# Patient Record
Sex: FEMALE | Race: WHITE | NOT HISPANIC OR LATINO | Employment: OTHER | URBAN - METROPOLITAN AREA
[De-identification: names, ages, dates, MRNs, and addresses within clinical notes are randomized per-mention and may not be internally consistent; named-entity substitution may affect disease eponyms.]

---

## 2017-01-17 ENCOUNTER — PATIENT MESSAGE (OUTPATIENT)
Dept: UROLOGY | Facility: CLINIC | Age: 57
End: 2017-01-17

## 2017-01-19 ENCOUNTER — PATIENT MESSAGE (OUTPATIENT)
Dept: UROLOGY | Facility: CLINIC | Age: 57
End: 2017-01-19

## 2017-01-31 ENCOUNTER — PATIENT MESSAGE (OUTPATIENT)
Dept: PULMONOLOGY | Facility: CLINIC | Age: 57
End: 2017-01-31

## 2017-02-01 DIAGNOSIS — J42 CHRONIC WHEEZY BRONCHITIS: Primary | ICD-10-CM

## 2017-02-01 RX ORDER — PROMETHAZINE HYDROCHLORIDE, PHENYLEPHRINE HYDROCHLORIDE AND CODEINE PHOSPHATE 6.25; 5; 1 MG/5ML; MG/5ML; MG/5ML
5 SOLUTION ORAL 4 TIMES DAILY PRN
Qty: 118 ML | Refills: 0 | Status: SHIPPED | OUTPATIENT
Start: 2017-02-01 | End: 2017-02-11

## 2017-02-06 ENCOUNTER — HOSPITAL ENCOUNTER (OUTPATIENT)
Dept: RADIOLOGY | Facility: HOSPITAL | Age: 57
Discharge: HOME | End: 2017-02-06
Attending: UROLOGY
Payer: MEDICAID

## 2017-02-06 ENCOUNTER — OFFICE VISIT (OUTPATIENT)
Dept: UROLOGY | Facility: CLINIC | Age: 57
End: 2017-02-06
Payer: MEDICAID

## 2017-02-06 DIAGNOSIS — R31.9 HEMATURIA: Primary | ICD-10-CM

## 2017-02-06 DIAGNOSIS — R31.9 HEMATURIA: ICD-10-CM

## 2017-02-06 LAB
BACTERIA #/AREA URNS AUTO: ABNORMAL /HPF
HYALINE CASTS UR QL AUTO: 3 /LPF
MICROSCOPIC COMMENT: ABNORMAL
RBC #/AREA URNS AUTO: 12 /HPF (ref 0–4)
SQUAMOUS #/AREA URNS AUTO: 1 /HPF
WBC #/AREA URNS AUTO: 36 /HPF (ref 0–5)

## 2017-02-06 PROCEDURE — 99214 OFFICE O/P EST MOD 30 MIN: CPT | Mod: S$PBB,,, | Performed by: UROLOGY

## 2017-02-06 NOTE — MR AVS SNAPSHOT
Replaced by [facility]  [address]  [place] [address]  Phone: [phone]  Fax: [phone]                  Sarah Monahan   2017 10:20 AM   Office Visit    Description:  Female : 1960   Provider:  Pro Darby IV, MD   Department:  [facility]           Diagnoses this Visit        Comments    Hematuria    -  Primary            To Do List           Future Appointments        Provider Department Dept Phone    2017 11:10 AM LABORATORY, [address] [facility] [phone]    2017 1:00 PM [facility] CT1 LIMIT 500 LBS [facility] -  [phone]    2017 1:00 PM Pro Darby IV, MD Replaced by [facility] [phone]    2017 9:00 AM David Tocsano MD Replaced by [facility] [phone]      Goals (5 Years of Data)     None      [facility] On Call     [facility] On Call Nurse Care Line -  Assistance  Registered nurses in the [facility] On Call Center provide clinical advisement, health education, appointment booking, and other advisory services.  Call for this free service at [phone].             Medications           Message regarding Medications     Verify the changes and/or additions to your medication regime listed below are the same as discussed with your clinician today.  If any of these changes or additions are incorrect, please notify your healthcare provider.             Verify that the below list of medications is an accurate representation of the medications you are currently taking.  If none reported, the list may be blank. If incorrect, please contact your healthcare provider. Carry this list with you in case of emergency.           Current Medications     albuterol (ACCUNEB) 1.25 mg/3 mL Nebu Take 3 mLs (1.25 mg total) by nebulization 3 (three) times daily as needed.    albuterol 90 mcg/actuation inhaler Inhale 2 puffs into the lungs every 4 to 6 hours as needed for Wheezing.    baclofen (LIORESAL) 10  MG tablet Take 10 mg by mouth.    clonazePAM (KLONOPIN) 1 MG tablet Take 1 mg by mouth.    ergocalciferol (ERGOCALCIFEROL) 50,000 unit Cap Take 50,000 Units by mouth.    estradiol 10 mcg Tab Use twice week    fluticasone-salmeterol 500-50 mcg/dose (ADVAIR DISKUS) 500-50 mcg/dose DsDv diskus inhaler Inhale 1 puff into the lungs 2 (two) times daily.    inhalation device (VORTEX HOLDING CHAMBER) Use as directed for inhalation. For use with albuterol inhaler.    omeprazole (PRILOSEC) 40 MG capsule Take 40 mg by mouth.    ondansetron (ZOFRAN-ODT) 8 MG TbDL     phenyleph-promethazine-cod 5-6.25-10 mg/5 ml 6.25-5-10 mg/5 mL Syrp Take 5 mLs by mouth 4 (four) times daily as needed.    quetiapine (SEROQUEL) 50 MG tablet     ranitidine (ZANTAC) 150 MG tablet     roflumilast (DALIRESP) 500 mcg Tab Take 1 tablet (500 mcg total) by mouth once daily. Medically necessary    trazodone (DESYREL) 150 MG tablet     umeclidinium (INCRUSE ELLIPTA) 62.5 mcg/actuation DsDv Inhale 62.5 mcg into the lungs once daily. Medically necessary           Clinical Reference Information           Allergies as of 2/6/2017     Macrodantin [Nitrofurantoin Macrocrystalline]    Nitrofurantoin    Nitrofurantoin Monohyd/m-cryst      Immunizations Administered on Date of Encounter - 2/6/2017     None      Orders Placed During Today's Visit     Future Labs/Procedures Expected by Expires    Creatinine, serum  2/6/2017 4/7/2018    CT Urogram Abd Pelvis W WO  2/6/2017 2/6/2018      Language Assistance Services     ATTENTION: Language assistance services are available, free of charge. Please call [phone].      ATENCIÓN: Si hailyla alysa, tiene a hernández disposición servicios gratuitos de asistencia lingüística. Llame al [phone].     CHÚ Ý: N?u b?n nói Ti?ng Vi?t, có các d?ch v? h? tr? ngôn ng? mi?n phí dành cho b?n. G?i s? [phone].         [facility] complies with applicable Federal civil rights laws and does not discriminate on the basis of  race, color, national origin, age, disability, or sex.

## 2017-02-06 NOTE — PROGRESS NOTES
Chief Complaint: Urolithiasis followup    HPI:   2/6/17: Back after a long time.  Has been seeing Dr. Hutchinson for UTI and she was just given Abx.  Still seeing gross hematuria.  Has a burning right flank pain for the last few months.  Says she had a normal IVP at [facility] last November.  11/12/15: Added bactrim prophylaxis to regimen. Still symptomatic and thinks she has a UTI.  Is on the daily bactrim.  Strong urgency, malodorous urine.  Seeing gross hematuria from time to time.  10/12/15: Still has some right flank pain not related to position.  Will check UCx again - UA negative.  Feels much better than a month ago.  Was in a detox center in [place] and dx with another UTI last week and is finishing more ABx given there.  9/14/15: Returns again with +Ucx treated with cipro; right flank pain persists.  CT shows some stones on the right that are overall stable, but perhaps there is some degree of infection related to them.  8/27/15: Returns for followup after being admitted for a presumed kidney infection earlier this month at [facility].  Has had monghts of microhematuria, recurrent UTI, bilateral flank pain.  Nit+ urine today.  No imaging was done there.  Symptoms have been going on since last summer with UTI then, and the flank pain picked up about a week before the hospitilization.  Malodorous urine.  11/8/12: S/P Right URS; Doing well, no pain, happy to have her stones out and no stent. Was told by another office on Monday she had a UTI so she came for a nurse visit; our UA and UCx  Negative. 24 hour urine study showed low UOP and she had elevated PTH.    Allergies:  Macrodantin [nitrofurantoin macrocrystalline]; Nitrofurantoin; and Nitrofurantoin monohyd/m-cryst    Medications: has a current medication list which includes the following prescription(s): albuterol, albuterol, baclofen, clonazepam, ergocalciferol, estradiol, fluticasone-salmeterol 500-50 mcg/dose, inhalation device, omeprazole, ondansetron,  phenyleph-promethazine-cod 5-6.25-10 mg/5 ml, quetiapine, ranitidine, roflumilast, trazodone, and umeclidinium.    Review of Systems:  General: No fever, chills, fatigability, or weight loss.  Skin: No rashes, itching, or changes in color or texture of skin.  Chest: Denies GUAN, cyanosis, wheezing, cough, and sputum production.  Abdomen: Appetite fine. No weight loss. Denies diarrhea, abdominal pain, hematemesis, or blood in stool.  Musculoskeletal: No joint stiffness or swelling. Denies back pain.  : As above.  All other review of systems negative.    PMH:   has a past medical history of Anemia; Asthma; Bronchitis chronic; Diabetes mellitus; Emphysema of lung; Herniated disc; Hyperlipidemia; Kidney stone; Lung disease; Supraventricular tachycardia; and Urinary tract infection.    PSH:   has a past surgical history that includes Cervical fusion; Cholecystectomy; Knee arthroscopy w/ ACL reconstruction; Hernia repair;  section, classic; Salpingectomy; and Cystoscopy w/ laser lithotripsy.    FamHx: family history includes Cancer in her maternal grandmother and mother; Diabetes in her father and paternal grandmother; Heart attack (age of onset: 60) in her father; Heart disease in her father; Heart failure in her paternal grandmother; Hypertension in her father.    SocHx:  reports that she quit smoking about 20 months ago. She has a 40.00 pack-year smoking history. She has never used smokeless tobacco. She reports that she does not drink alcohol or use illicit drugs.     Physical Exam:  Vitals:   There were no vitals filed for this visit.  General: A&Ox3. No apparent distress. No deformities.  Neck: No masses. Normal thyroid.  Lungs: normal inspiration. No use of accessory muscles.  Heart: normal pulse. No arrhythmias.  Abdomen: Soft. NT. ND.  Skin: The skin is warm and dry. No jaundice.  Ext: No c/c/e.  : deferred.    Labs/Studies:   CT    9/10/15: Small bilateral stones largest 4 mm    Impression/Plan:   1.  Discussed situation again at length.  Cath UA/UCx sent again to check if UTI cleared.  2. Will get CT Urogram; possible cysto.  3. Has recurrent UTI it seems.  Showers no baths.  Dial soap.

## 2017-02-06 NOTE — PROGRESS NOTES
Cath u/a and c/s ordered per Dr. Darby. Using sterile technique, inserted pediatric catheter into bladder and obtained 10ml of cloudy, yellow urine. Patient tolerated well. Specimen sent to lab.

## 2017-02-07 ENCOUNTER — HOSPITAL ENCOUNTER (OUTPATIENT)
Dept: RADIOLOGY | Facility: HOSPITAL | Age: 57
Discharge: HOME | End: 2017-02-07
Attending: UROLOGY
Payer: MEDICAID

## 2017-02-07 PROCEDURE — 25500020 PHARM REV CODE 255: Performed by: UROLOGY

## 2017-02-07 PROCEDURE — 74178 CT ABD&PLV WO CNTR FLWD CNTR: CPT | Mod: TC

## 2017-02-07 RX ADMIN — IOHEXOL 75 ML: 350 INJECTION, SOLUTION INTRAVENOUS at 03:02

## 2017-02-08 ENCOUNTER — PATIENT MESSAGE (OUTPATIENT)
Dept: UROLOGY | Facility: CLINIC | Age: 57
End: 2017-02-08

## 2017-02-08 LAB — BACTERIA UR CULT: NO GROWTH

## 2017-02-09 ENCOUNTER — PATIENT MESSAGE (OUTPATIENT)
Dept: UROLOGY | Facility: CLINIC | Age: 57
End: 2017-02-09

## 2017-02-09 DIAGNOSIS — N20.0 RENAL STONE: Primary | ICD-10-CM

## 2017-02-15 ENCOUNTER — HOSPITAL ENCOUNTER (OUTPATIENT)
Dept: RADIOLOGY | Facility: HOSPITAL | Age: 57
Discharge: HOME | End: 2017-02-15
Attending: UROLOGY
Payer: MEDICAID

## 2017-02-15 DIAGNOSIS — N20.0 RENAL STONE: ICD-10-CM

## 2017-02-15 PROCEDURE — 74000 XR ABDOMEN AP 1 VIEW: CPT | Mod: TC,PO

## 2017-02-15 PROCEDURE — 74000 XR ABDOMEN AP 1 VIEW: CPT | Mod: 26,,, | Performed by: RADIOLOGY

## 2017-02-17 ENCOUNTER — PATIENT MESSAGE (OUTPATIENT)
Dept: UROLOGY | Facility: CLINIC | Age: 57
End: 2017-02-17

## 2017-02-20 ENCOUNTER — TELEPHONE (OUTPATIENT)
Dept: PULMONOLOGY | Facility: CLINIC | Age: 57
End: 2017-02-20

## 2017-02-21 ENCOUNTER — PATIENT MESSAGE (OUTPATIENT)
Dept: UROLOGY | Facility: CLINIC | Age: 57
End: 2017-02-21

## 2017-02-22 ENCOUNTER — PATIENT MESSAGE (OUTPATIENT)
Dept: UROLOGY | Facility: CLINIC | Age: 57
End: 2017-02-22

## 2017-03-09 DIAGNOSIS — J44.9 COPD, MODERATE: ICD-10-CM

## 2017-03-09 RX ORDER — FLUTICASONE PROPIONATE AND SALMETEROL 50; 500 UG/1; UG/1
POWDER RESPIRATORY (INHALATION)
Qty: 180 EACH | Refills: 3 | Status: SHIPPED | OUTPATIENT
Start: 2017-03-09 | End: 2017-05-16 | Stop reason: CLARIF

## 2017-03-10 ENCOUNTER — CLINICAL SUPPORT (OUTPATIENT)
Dept: CARDIOLOGY | Facility: CLINIC | Age: 57
End: 2017-03-10
Payer: MEDICAID

## 2017-03-10 ENCOUNTER — LAB VISIT (OUTPATIENT)
Dept: LAB | Facility: HOSPITAL | Age: 57
End: 2017-03-10
Attending: UROLOGY
Payer: MEDICAID

## 2017-03-10 ENCOUNTER — OFFICE VISIT (OUTPATIENT)
Dept: UROLOGY | Facility: CLINIC | Age: 57
End: 2017-03-10
Payer: MEDICAID

## 2017-03-10 VITALS — SYSTOLIC BLOOD PRESSURE: 132 MMHG | DIASTOLIC BLOOD PRESSURE: 88 MMHG | WEIGHT: 165 LBS | BODY MASS INDEX: 28.32 KG/M2

## 2017-03-10 DIAGNOSIS — N20.0 RENAL STONE: Primary | ICD-10-CM

## 2017-03-10 DIAGNOSIS — N20.0 RENAL STONE: ICD-10-CM

## 2017-03-10 LAB
ANION GAP SERPL CALC-SCNC: 8 MMOL/L
BASOPHILS # BLD AUTO: 0.03 K/UL
BASOPHILS NFR BLD: 0.5 %
BILIRUB SERPL-MCNC: NORMAL MG/DL
BLOOD URINE, POC: NORMAL
BUN SERPL-MCNC: 15 MG/DL
CALCIUM SERPL-MCNC: 9.5 MG/DL
CHLORIDE SERPL-SCNC: 106 MMOL/L
CO2 SERPL-SCNC: 25 MMOL/L
COLOR, POC UA: YELLOW
CREAT SERPL-MCNC: 0.8 MG/DL
DIFFERENTIAL METHOD: ABNORMAL
EOSINOPHIL # BLD AUTO: 0.1 K/UL
EOSINOPHIL NFR BLD: 2.1 %
ERYTHROCYTE [DISTWIDTH] IN BLOOD BY AUTOMATED COUNT: 14.2 %
EST. GFR  (AFRICAN AMERICAN): >60 ML/MIN/1.73 M^2
EST. GFR  (NON AFRICAN AMERICAN): >60 ML/MIN/1.73 M^2
GLUCOSE SERPL-MCNC: 122 MG/DL
GLUCOSE UR QL STRIP: NORMAL
HCT VFR BLD AUTO: 39.9 %
HGB BLD-MCNC: 13.1 G/DL
KETONES UR QL STRIP: NORMAL
LEUKOCYTE ESTERASE URINE, POC: NORMAL
LYMPHOCYTES # BLD AUTO: 2.9 K/UL
LYMPHOCYTES NFR BLD: 47 %
MCH RBC QN AUTO: 33.7 PG
MCHC RBC AUTO-ENTMCNC: 32.8 %
MCV RBC AUTO: 103 FL
MONOCYTES # BLD AUTO: 0.4 K/UL
MONOCYTES NFR BLD: 6.5 %
NEUTROPHILS # BLD AUTO: 2.7 K/UL
NEUTROPHILS NFR BLD: 43.7 %
NITRITE, POC UA: NORMAL
PH, POC UA: 6
PLATELET # BLD AUTO: 261 K/UL
PMV BLD AUTO: 10.6 FL
POTASSIUM SERPL-SCNC: 4.6 MMOL/L
PROTEIN, POC: NORMAL
RBC # BLD AUTO: 3.89 M/UL
SODIUM SERPL-SCNC: 139 MMOL/L
SPECIFIC GRAVITY, POC UA: 1.02
UROBILINOGEN, POC UA: NORMAL
WBC # BLD AUTO: 6.15 K/UL

## 2017-03-10 PROCEDURE — 99214 OFFICE O/P EST MOD 30 MIN: CPT | Mod: S$PBB,,, | Performed by: UROLOGY

## 2017-03-10 PROCEDURE — 93010 ELECTROCARDIOGRAM REPORT: CPT | Mod: S$PBB,,, | Performed by: INTERNAL MEDICINE

## 2017-03-10 PROCEDURE — 36415 COLL VENOUS BLD VENIPUNCTURE: CPT

## 2017-03-10 PROCEDURE — 80048 BASIC METABOLIC PNL TOTAL CA: CPT

## 2017-03-10 PROCEDURE — 99999 PR PBB SHADOW E&M-EST. PATIENT-LVL II: CPT | Mod: PBBFAC,,, | Performed by: UROLOGY

## 2017-03-10 PROCEDURE — 85025 COMPLETE CBC W/AUTO DIFF WBC: CPT

## 2017-03-10 NOTE — PROGRESS NOTES
Chief Complaint: Urolithiasis followup    HPI:   3/10/17: No UTI last visit.  Ct/KUB show a 12mm right renal stone, upper pole amenable to ESWL; has other 1-2mm stones.  2/6/17: Back after a long time.  Has been seeing Dr. Hutchinson for UTI and she was just given Abx.  Still seeing gross hematuria.  Has a burning right flank pain for the last few months.  Says she had a normal IVP at [facility] last November.  11/12/15: Added bactrim prophylaxis to regimen. Still symptomatic and thinks she has a UTI.  Is on the daily bactrim.  Strong urgency, malodorous urine.  Seeing gross hematuria from time to time.  10/12/15: Still has some right flank pain not related to position.  Will check UCx again - UA negative.  Feels much better than a month ago.  Was in a detox center in [place] and dx with another UTI last week and is finishing more ABx given there.  9/14/15: Returns again with +Ucx treated with cipro; right flank pain persists.  CT shows some stones on the right that are overall stable, but perhaps there is some degree of infection related to them.  8/27/15: Returns for followup after being admitted for a presumed kidney infection earlier this month at [facility].  Has had monghts of microhematuria, recurrent UTI, bilateral flank pain.  Nit+ urine today.  No imaging was done there.  Symptoms have been going on since last summer with UTI then, and the flank pain picked up about a week before the hospitilization.  Malodorous urine.  11/8/12: S/P Right URS; Doing well, no pain, happy to have her stones out and no stent. Was told by another office on Monday she had a UTI so she came for a nurse visit; our UA and UCx  Negative. 24 hour urine study showed low UOP and she had elevated PTH.    Allergies:  Macrodantin [nitrofurantoin macrocrystalline]; Nitrofurantoin; and Nitrofurantoin monohyd/m-cryst    Medications: has a current medication list which includes the following prescription(s): advair diskus, albuterol, albuterol,  baclofen, clonazepam, ergocalciferol, estradiol, inhalation device, omeprazole, ondansetron, quetiapine, ranitidine, roflumilast, trazodone, and umeclidinium.    Review of Systems:  General: No fever, chills, fatigability, or weight loss.  Skin: No rashes, itching, or changes in color or texture of skin.  Chest: Denies GUAN, cyanosis, wheezing, cough, and sputum production.  Abdomen: Appetite fine. No weight loss. Denies diarrhea, abdominal pain, hematemesis, or blood in stool.  Musculoskeletal: No joint stiffness or swelling. Denies back pain.  : As above.  All other review of systems negative.    PMH:   has a past medical history of Anemia; Asthma; Bronchitis chronic; Diabetes mellitus; Emphysema of lung; Herniated disc; Hyperlipidemia; Kidney stone; Lung disease; Supraventricular tachycardia; and Urinary tract infection.    PSH:   has a past surgical history that includes Cervical fusion; Cholecystectomy; Knee arthroscopy w/ ACL reconstruction; Hernia repair;  section, classic; Salpingectomy; and Cystoscopy w/ laser lithotripsy.    FamHx: family history includes Cancer in her maternal grandmother and mother; Diabetes in her father and paternal grandmother; Heart attack (age of onset: 60) in her father; Heart disease in her father; Heart failure in her paternal grandmother; Hypertension in her father.    SocHx:  reports that she quit smoking about 21 months ago. She has a 40.00 pack-year smoking history. She has never used smokeless tobacco. She reports that she does not drink alcohol or use illicit drugs.     Physical Exam:  Vitals:   Vitals:    03/10/17 1136   BP: 132/88     General: A&Ox3. No apparent distress. No deformities.  Neck: No masses. Normal thyroid.  Lungs: normal inspiration. No use of accessory muscles.  Heart: normal pulse. No arrhythmias.  Abdomen: Soft. NT. ND.  Skin: The skin is warm and dry. No jaundice.  Ext: No c/c/e.  : deferred.    Labs/Studies:   CT    9/10/15: Small bilateral  stones largest 4 mm    3/10/17: Multiple bilateral stones 1-2mm, 12mm right upper pole stone    Impression/Plan:   1. No UTI, UA findings likely from stone.  Needs to get cleared for surgery and will arrange that prior to making surgery arrangements.  Risks/benefits reviewed, consents on chart.

## 2017-03-15 ENCOUNTER — HOSPITAL ENCOUNTER (OUTPATIENT)
Dept: RADIOLOGY | Facility: HOSPITAL | Age: 57
Discharge: HOME | End: 2017-03-15
Payer: MEDICAID

## 2017-03-15 ENCOUNTER — TELEPHONE (OUTPATIENT)
Dept: PULMONOLOGY | Facility: CLINIC | Age: 57
End: 2017-03-15

## 2017-03-15 ENCOUNTER — PROCEDURE VISIT (OUTPATIENT)
Dept: PULMONOLOGY | Facility: CLINIC | Age: 57
End: 2017-03-15
Payer: MEDICAID

## 2017-03-15 DIAGNOSIS — J44.9 CHRONIC OBSTRUCTIVE PULMONARY DISEASE, UNSPECIFIED COPD TYPE: Primary | ICD-10-CM

## 2017-03-15 DIAGNOSIS — J44.9 CHRONIC OBSTRUCTIVE PULMONARY DISEASE, UNSPECIFIED COPD TYPE: ICD-10-CM

## 2017-03-15 LAB
ALLENS TEST: ABNORMAL
DELSYS: ABNORMAL
HCO3 UR-SCNC: 23 MMOL/L (ref 24–28)
PCO2 BLDA: 40.2 MMHG (ref 35–45)
PH SMN: 7.37 [PH] (ref 7.35–7.45)
PO2 BLDA: 71 MMHG (ref 80–100)
POC BE: -2 MMOL/L
POC SATURATED O2: 94 % (ref 95–100)
SAMPLE: ABNORMAL
SITE: ABNORMAL

## 2017-03-15 PROCEDURE — 94010 BREATHING CAPACITY TEST: CPT | Mod: 26,S$PBB,, | Performed by: INTERNAL MEDICINE

## 2017-03-15 PROCEDURE — 36600 WITHDRAWAL OF ARTERIAL BLOOD: CPT | Mod: 59,S$PBB,, | Performed by: INTERNAL MEDICINE

## 2017-03-15 PROCEDURE — 71020 XR CHEST PA AND LATERAL: CPT | Mod: 26,,, | Performed by: RADIOLOGY

## 2017-03-17 ENCOUNTER — TELEPHONE (OUTPATIENT)
Dept: PULMONOLOGY | Facility: CLINIC | Age: 57
End: 2017-03-17

## 2017-03-17 ENCOUNTER — OFFICE VISIT (OUTPATIENT)
Dept: PULMONOLOGY | Facility: CLINIC | Age: 57
End: 2017-03-17
Payer: MEDICAID

## 2017-03-17 VITALS
RESPIRATION RATE: 18 BRPM | WEIGHT: 156.94 LBS | BODY MASS INDEX: 26.79 KG/M2 | SYSTOLIC BLOOD PRESSURE: 140 MMHG | DIASTOLIC BLOOD PRESSURE: 80 MMHG | HEART RATE: 94 BPM | OXYGEN SATURATION: 94 % | HEIGHT: 64 IN

## 2017-03-17 DIAGNOSIS — R05.9 COUGH: ICD-10-CM

## 2017-03-17 DIAGNOSIS — J44.9 CHRONIC OBSTRUCTIVE PULMONARY DISEASE, UNSPECIFIED COPD TYPE: ICD-10-CM

## 2017-03-17 DIAGNOSIS — Z72.0 TOBACCO USE: ICD-10-CM

## 2017-03-17 DIAGNOSIS — J44.1 COPD WITH EXACERBATION: Primary | ICD-10-CM

## 2017-03-17 PROCEDURE — 99214 OFFICE O/P EST MOD 30 MIN: CPT | Mod: PBBFAC | Performed by: NURSE PRACTITIONER

## 2017-03-17 PROCEDURE — 99999 PR PBB SHADOW E&M-EST. PATIENT-LVL IV: CPT | Mod: PBBFAC,,, | Performed by: NURSE PRACTITIONER

## 2017-03-17 PROCEDURE — 99214 OFFICE O/P EST MOD 30 MIN: CPT | Mod: S$PBB,,, | Performed by: NURSE PRACTITIONER

## 2017-03-17 RX ORDER — DEXTROSE 4 G
TABLET,CHEWABLE ORAL
COMMUNITY
Start: 2016-12-07

## 2017-03-17 RX ORDER — PROMETHAZINE HYDROCHLORIDE AND DEXTROMETHORPHAN HYDROBROMIDE 6.25; 15 MG/5ML; MG/5ML
SYRUP ORAL
COMMUNITY
Start: 2017-03-15 | End: 2017-03-17 | Stop reason: ALTCHOICE

## 2017-03-17 RX ORDER — TRAMADOL HYDROCHLORIDE 50 MG/1
TABLET ORAL
COMMUNITY
Start: 2017-03-15 | End: 2020-05-01

## 2017-03-17 RX ORDER — ROFLUMILAST 500 UG/1
500 TABLET ORAL DAILY
Qty: 90 TABLET | Refills: 3 | Status: SHIPPED | OUTPATIENT
Start: 2017-03-17 | End: 2017-03-17 | Stop reason: SDUPTHER

## 2017-03-17 RX ORDER — PREDNISONE 20 MG/1
TABLET ORAL
Qty: 20 TABLET | Refills: 0 | Status: SHIPPED | OUTPATIENT
Start: 2017-03-17 | End: 2017-03-29

## 2017-03-17 RX ORDER — DOXYCYCLINE 100 MG/1
100 CAPSULE ORAL 2 TIMES DAILY
Qty: 20 CAPSULE | Refills: 0 | Status: SHIPPED | OUTPATIENT
Start: 2017-03-17 | End: 2017-03-27

## 2017-03-17 RX ORDER — PROMETHAZINE HYDROCHLORIDE AND CODEINE PHOSPHATE 6.25; 1 MG/5ML; MG/5ML
5 SOLUTION ORAL EVERY 4 HOURS PRN
Qty: 473 ML | Refills: 0 | Status: SHIPPED | OUTPATIENT
Start: 2017-03-17 | End: 2018-01-17 | Stop reason: SDUPTHER

## 2017-03-17 RX ORDER — ROFLUMILAST 500 UG/1
500 TABLET ORAL DAILY
Qty: 90 TABLET | Refills: 3 | Status: SHIPPED | OUTPATIENT
Start: 2017-03-17 | End: 2017-06-30

## 2017-03-17 NOTE — TELEPHONE ENCOUNTER
Spoke with the pharmacy to confirm change in medication. Ms Reyes also spoke with the pharmacist at [facility] pharmacy.

## 2017-03-17 NOTE — TELEPHONE ENCOUNTER
----- Message from Bessy Batista sent at 3/17/2017  3:24 PM CDT -----  Contact: [facility]  States calling regarding a Rx they received on the patient today.    Pt use..    [facility] - [address] [address]  Phone: [phone] Fax: [phone]

## 2017-03-17 NOTE — MR AVS SNAPSHOT
[facility]  [address] [address]  Phone: [phone]  Fax: [phone]                  Sarah Monahan   3/17/2017 2:40 PM   Office Visit    Description:  Female : 1960   Provider:  Alise Reyes NP   Department:  [facility]           Reason for Visit     COPD           Diagnoses this Visit        Comments    Cough    -  Primary     Chronic obstructive pulmonary disease, unspecified COPD type         COPD with exacerbation         Tobacco use     up to 2 pks a day. request complete cessation, and begin to cut back.            To Do List           Future Appointments        Provider Department Dept Phone    3/28/2017 3:00 PM PULMONARY LAB, [facility]- Pulmonary Function [facility] [phone]    3/29/2017 3:00 PM David Toscano MD [facility]monary Services [phone]      Goals (5 Years of Data)     None      Follow-Up and Disposition     Return in about 12 days (around 3/29/2017) for COPD follow up post acute flare w/review david, needs clearance for lithotripsy if lungs improved. .       These Medications        Disp Refills Start End    roflumilast (DALIRESP) 500 mcg Tab 90 tablet 3 3/17/2017 3/17/2018    Take 1 tablet (500 mcg total) by mouth once daily. Medically necessary - Oral    Pharmacy: [address] #: [phone]       Notes to Pharmacy: Please call patient to pick prescription once it is ready.    promethazine-codeine 6.25-10 mg/5 ml (PHENERGAN WITH CODEINE) 6.25-10 mg/5 mL syrup 473 mL 0 3/17/2017 3/27/2017    Take 5 mLs by mouth every 4 (four) hours as needed for Cough. - Oral    Pharmacy: [address] Ph #: [phone]       predniSONE (DELTASONE) 20 MG tablet 20 tablet 0 3/17/2017     3 daily x 3 days, 2 daily x 3 days, 1 daily x 3 days, 1/2 daily x 4 days.    Pharmacy: [facility]  - [address] #: [phone]       doxycycline (VIBRAMYCIN) 100 MG Cap 20 capsule 0 3/17/2017 3/27/2017    Take 1 capsule (100 mg total) by mouth 2 (two) times daily. - Oral    Ph[facility]: [facility] - [address] Ph #: [phone]         [facility] On Call     [facility] On Call Nurse Care Line - 24/7 Assistance  Registered nurses in the [facility] On Call Center provide clinical advisement, health education, appointment booking, and other advisory services.  Call for this free service at [phone].             Medications           Message regarding Medications     Verify the changes and/or additions to your medication regime listed below are the same as discussed with your clinician today.  If any of these changes or additions are incorrect, please notify your healthcare provider.        START taking these NEW medications        Refills    promethazine-codeine 6.25-10 mg/5 ml (PHENERGAN WITH CODEINE) 6.25-10 mg/5 mL syrup 0    Sig: Take 5 mLs by mouth every 4 (four) hours as needed for Cough.    Class: Print    Route: Oral    predniSONE (DELTASONE) 20 MG tablet 0    Sig: 3 daily x 3 days, 2 daily x 3 days, 1 daily x 3 days, 1/2 daily x 4 days.    Class: Normal    doxycycline (VIBRAMYCIN) 100 MG Cap 0    Sig: Take 1 capsule (100 mg total) by mouth 2 (two) times daily.    Class: Normal    Route: Oral      STOP taking these medications     ergocalciferol (ERGOCALCIFEROL) 50,000 unit Cap Take 50,000 Units by mouth.    estradiol 10 mcg Tab Use twice week    omeprazole (PRILOSEC) 40 MG capsule Take 40 mg by mouth.    ondansetron (ZOFRAN-ODT) 8 MG TbDL     promethazine-dextromethorphan (PROMETHAZINE-DM) 6.25-15 mg/5 mL Syrp TAKE ONE TEASPOONFUL BY MOUTH 4 TIMES DAILY AS NEEDED FOR COUGH           Verify that the below list of medications is an accurate representation of the medications you are currently taking.  If none reported, the list may be blank. If  "incorrect, please contact your healthcare provider. Carry this list with you in case of emergency.           Current Medications     ADVAIR DISKUS 500-50 mcg/dose DsDv diskus inhaler INHALE ONE DOSE BY MOUTH TWICE DAILY    albuterol (ACCUNEB) 1.25 mg/3 mL Nebu Take 3 mLs (1.25 mg total) by nebulization 3 (three) times daily as needed.    albuterol 90 mcg/actuation inhaler Inhale 2 puffs into the lungs every 4 to 6 hours as needed for Wheezing.    baclofen (LIORESAL) 10 MG tablet Take 10 mg by mouth.    blood sugar diagnostic Strp 1 each.    blood-glucose meter Misc Use to check blood sugar    clonazePAM (KLONOPIN) 1 MG tablet Take 1 mg by mouth.    doxycycline (VIBRAMYCIN) 100 MG Cap Take 1 capsule (100 mg total) by mouth 2 (two) times daily.    inhalation device (VORTEX HOLDING CHAMBER) Use as directed for inhalation. For use with albuterol inhaler.    predniSONE (DELTASONE) 20 MG tablet 3 daily x 3 days, 2 daily x 3 days, 1 daily x 3 days, 1/2 daily x 4 days.    promethazine-codeine 6.25-10 mg/5 ml (PHENERGAN WITH CODEINE) 6.25-10 mg/5 mL syrup Take 5 mLs by mouth every 4 (four) hours as needed for Cough.    quetiapine (SEROQUEL) 50 MG tablet     ranitidine (ZANTAC) 150 MG tablet     roflumilast (DALIRESP) 500 mcg Tab Take 1 tablet (500 mcg total) by mouth once daily. Medically necessary    tramadol (ULTRAM) 50 mg tablet TAKE ONE TABLET BY MOUTH EVERY 6 HOURS AS NEEDED FOR PAIN    trazodone (DESYREL) 150 MG tablet     umeclidinium (INCRUSE ELLIPTA) 62.5 mcg/actuation DsDv Inhale 62.5 mcg into the lungs once daily. Medically necessary           Clinical Reference Information           Your Vitals Were     BP Pulse Resp Height Weight SpO2    140/80 (BP Location: Right arm, Patient Position: Sitting, BP Method: Manual) 94 18 5' 4" (1.626 m) 71.2 kg (156 lb 15.5 oz) 94%    BMI                26.94 kg/m2          Blood Pressure          Most Recent Value    BP  (!)  140/80      Allergies as of 3/17/2017     Macrodantin " [Nitrofurantoin Macrocrystalline]    Nitrofurantoin    Nitrofurantoin Monohyd/m-cryst      Immunizations Administered on Date of Encounter - 3/17/2017     None      Orders Placed During Today's Visit     Future Labs/Procedures Expected by Expires    Spirometry with/without bronchodilator  As directed 3/17/2018      Language Assistance Services     ATTENTION: Language assistance services are available, free of charge. Please call [phone].      ATENCIÓN: Si habla español, tiene a hernández disposición servicios gratuitos de asistencia lingüística. Llame al [phone].     CHÚ Ý: N?u b?n nói Ti?ng Vi?t, có các d?ch v? h? tr? ngôn ng? mi?n phí dành cho b?n. G?i s? [phone].         [facility] complies with applicable Federal civil rights laws and does not discriminate on the basis of race, color, national origin, age, disability, or sex.

## 2017-03-17 NOTE — Clinical Note
Patient is not cleared for surgery related to presenting with copd exacerbation. Has follow up on 3/29/17 with Dr. Toscano.

## 2017-03-17 NOTE — PROGRESS NOTES
Subjective:      Patient ID: Sarah Monahan is a 56 y.o. female.    Patient Active Problem List   Diagnosis    Hypoxemia requiring supplemental oxygen    COPD, moderate    Insomnia    Depression    Lymphadenopathy, hilar    Osteopenia    Chest pain    Abnormal CXR    Aortic atherosclerosis    Lymphadenopathy, mediastinal    Hilar mass    Chronic wheezy bronchitis    UTI (urinary tract infection)    Anxiety    Disc disorder of cervical region    Chronic pain    Chronic obstructive pulmonary disease    Low back pain    Disorder of intervertebral disc    Arthropathy of lumbar facet joint    Thoracic back pain    Osteoarthritis of thoracic spine       Problem list has been reviewed.    Chief Complaint: COPD      HPI  The patient states she presents for evaluation of copd and also consideration for clearance for elective lithotripsy of renal stones with Dr. Darby that is pending being scheduled once she is cleared for surgery    The patient has symptoms or an exacerbation. She states that she  is not quite at her respiratory baseline.    She admits to  cough,  sputum and wheezing that is intermittent with productive cough of thick yellow mucous.     Her current respiratory regimen: Albuterol MDI(or generic equivalent), Advair Diskus, Albuterol  Nebulizer and Daliresp: She has been off Daliresp over 1.5 weeks, states neglected requesting refill.    CAT score today is 28.    Home oxygen [place] 2 lm nightly.    Previous Report Reviewed: lab reports, office notes and radiology reports     The following portions of the patient's history were reviewed and updated as appropriate: allergies, current medications, past family history, past medical history, past social history, past surgical history and problem list.    Review of Systems   Constitutional: Negative for fever, chills, weight loss, weight gain, activity change, appetite change, fatigue and night sweats.   HENT: Negative for postnasal drip,  rhinorrhea, sinus pressure, voice change and congestion.    Eyes: Negative for redness and itching.   Respiratory: Positive for cough, sputum production, wheezing, asthma nighttime symptoms and use of rescue inhaler. Negative for snoring, chest tightness, shortness of breath, orthopnea, dyspnea on extertion and somnolence.    Cardiovascular: Negative for chest pain, palpitations and leg swelling.   Genitourinary: Negative for difficulty urinating and hematuria.   Endocrine: Negative for polydipsia, polyphagia, cold intolerance, heat intolerance and polyuria.    Musculoskeletal: Positive for back pain. Negative for arthralgias, gait problem, joint swelling and myalgias.   Skin: Negative.    Gastrointestinal: Negative for nausea, vomiting, abdominal pain and acid reflux.   Neurological: Negative for dizziness, weakness, light-headedness and headaches.   Hematological: Negative for adenopathy. No excessive bruising.   Psychiatric/Behavioral: Negative for sleep disturbance. The patient is nervous/anxious.       Objective:     Physical Exam   Constitutional: She is oriented to person, place, and time. Vital signs are normal. She appears well-developed and well-nourished. She is active and cooperative.  Non-toxic appearance. She does not appear ill. No distress.   HENT:   Head: Normocephalic.   Right Ear: External ear normal.   Left Ear: External ear normal.   Nose: Nose normal.   Mouth/Throat: Oropharynx is clear and moist. No oropharyngeal exudate.   Eyes: Conjunctivae are normal.   Neck: Normal range of motion. Neck supple.   Cardiovascular: Normal rate, regular rhythm, normal heart sounds and intact distal pulses.    Pulmonary/Chest: Effort normal. She has wheezes (diffuse fine expiratory).   Abdominal: Soft. Bowel sounds are normal.   Musculoskeletal: Normal range of motion.   Neurological: She is alert and oriented to person, place, and time.   Skin: Skin is warm and dry.   Psychiatric: She has a normal mood and  affect. Her behavior is normal. Judgment and thought content normal.   Vitals reviewed.    Personal Diagnostic Review  Chest xray 3/15/2017  Chest two views.  Comparison 11/17/2016.  Findings: Heart size and mediastinal contour are normal.    There is hyperexpansion of lungs and mild coarsening of the basilar bronchovascular markings in keeping with COPD.   No confluent infiltrate or pleural effusion.  Thoracic spondylosis and postoperative changes in the lower cervical spine.    Pulmonary function tests: 3/15/2017 patient took bronchodilator prior to testing. FEV1: 1.14  (42 % predicted), FVC:  3.02 (88 % predicted), FEV1/FVC:  37.82 (48 % predicted).  Severe obstructive air flow component.   Compared to david 11/16/16 FEV1 reduced 6%.     ABG 3/15/2017 on room air  Ph 7.36   PCO2 40   PO2 71  HCO3 23.0  BE -2  SaO2 94%    Assessment:     1. COPD with exacerbation    2. Cough    3. Chronic obstructive pulmonary disease, unspecified COPD type    4. Tobacco use      Orders Placed This Encounter   Procedures    Spirometry with/without bronchodilator     Standing Status:   Future     Standing Expiration Date:   3/17/2018       Plan:     Sarah was seen today for copd.    Diagnoses and all orders for this visit:    COPD with exacerbation  Comments:  complete prednisone taper and doxy antibiotic therapy.   Orders:  -     predniSONE (DELTASONE) 20 MG tablet; 3 daily x 3 days, 2 daily x 3 days, 1 daily x 3 days, 1/2 daily x 4 days.  -     doxycycline (VIBRAMYCIN) 100 MG Cap; Take 1 capsule (100 mg total) by mouth 2 (two) times daily.    Cough  Comments:  request refill on promethazine cough syrup.   Orders:  -     promethazine-codeine 6.25-10 mg/5 ml (PHENERGAN WITH CODEINE) 6.25-10 mg/5 mL syrup; Take 5 mLs by mouth every 4 (four) hours as needed for Cough.    Chronic obstructive pulmonary disease, unspecified COPD type  Comments:  conrtinue adviar, albuterol neb/inhaler as needed. refill on daliresp. new ex  with incruse  controller inhaler.  Orders:  -     Discontinue: roflumilast (DALIRESP) 500 mcg Tab; Take 1 tablet (500 mcg total) by mouth once daily. Medically necessary  -     Spirometry with/without bronchodilator; Future    Tobacco use  Comments:  up to 2 pks a day. request complete cessation, and begin to cut back. states aware of need to quit. not ready to quit.       Not cleared for lithotripsy surgery, she presents with acute copd exacerbation.    Patient will need to follow up as planned with dr vargas 3/29/2017 with consideration for surgery clearance at that visit.     Patient prescribed a controlled substance cough prep with codeine.  Side effects reviewed in detail. Instructed not to combine with other controlled substances, alcohol or recreational drugs. Habit forming, addictive potential of drug discussed.  Advised not to operate a vehicle while taking this medication. Policy of limited refills discussed.     Return in about 12 days (around 3/29/2017) for COPD follow up post acute flare w/review david, needs clearance for lithotripsy if lungs improved. .

## 2017-03-26 ENCOUNTER — PATIENT MESSAGE (OUTPATIENT)
Dept: UROLOGY | Facility: CLINIC | Age: 57
End: 2017-03-26

## 2017-03-28 ENCOUNTER — PROCEDURE VISIT (OUTPATIENT)
Dept: PULMONOLOGY | Facility: CLINIC | Age: 57
End: 2017-03-28
Payer: MEDICAID

## 2017-03-28 DIAGNOSIS — J44.9 CHRONIC OBSTRUCTIVE PULMONARY DISEASE, UNSPECIFIED COPD TYPE: ICD-10-CM

## 2017-03-28 LAB
POST FEF 25 75: 0.51 L/S (ref 1.95–3.19)
POST FET 100: 16.12 S
POST FEV1 FVC: 50 %
POST FEV1: 1.51 L (ref 2.41–3)
POST FIF 50: 4.95 L/S
POST FVC: 3.01 L (ref 3.11–3.8)
POST PEF: 3.48 L/S (ref 5.71–7.43)
PRE FEF 25 75: 0.46 L/S (ref 1.95–3.19)
PRE FET 100: 15.45 S
PRE FEV1 FVC: 37.82 % (ref 69.54–89.13)
PRE FEV1 FVC: 48 %
PRE FEV1: 1.14 L (ref 2.13–3.3)
PRE FEV1: 1.43 L (ref 2.41–3)
PRE FIF 50: 4.42 L/S
PRE FVC: 2.95 L (ref 3.11–3.8)
PRE FVC: 3.02 L (ref 2.76–4.15)
PRE PEF: 2.57 L/S (ref 4.87–8.29)
PRE PEF: 3.98 L/S (ref 5.71–7.43)
PREDICTED FEV1 FVC: 79.12 % (ref 74.22–84.02)
PREDICTED FEV1: 2.7 L (ref 2.41–3)
PREDICTED FVC: 3.45 L (ref 3.11–3.8)
PROVOCATION PROTOCOL: ABNORMAL

## 2017-03-28 PROCEDURE — 94060 EVALUATION OF WHEEZING: CPT | Mod: PBBFAC,PO

## 2017-03-28 PROCEDURE — 94060 EVALUATION OF WHEEZING: CPT | Mod: 26,S$PBB,, | Performed by: INTERNAL MEDICINE

## 2017-03-29 ENCOUNTER — OFFICE VISIT (OUTPATIENT)
Dept: PULMONOLOGY | Facility: CLINIC | Age: 57
End: 2017-03-29
Payer: MEDICAID

## 2017-03-29 VITALS
HEIGHT: 64 IN | HEART RATE: 85 BPM | RESPIRATION RATE: 18 BRPM | OXYGEN SATURATION: 96 % | BODY MASS INDEX: 27.85 KG/M2 | DIASTOLIC BLOOD PRESSURE: 68 MMHG | SYSTOLIC BLOOD PRESSURE: 130 MMHG | WEIGHT: 163.13 LBS

## 2017-03-29 DIAGNOSIS — J44.9 COPD, MODERATE: Primary | ICD-10-CM

## 2017-03-29 DIAGNOSIS — R59.0 LYMPHADENOPATHY, MEDIASTINAL: ICD-10-CM

## 2017-03-29 DIAGNOSIS — Z99.81 HYPOXEMIA REQUIRING SUPPLEMENTAL OXYGEN: ICD-10-CM

## 2017-03-29 DIAGNOSIS — M85.80 OSTEOPENIA: ICD-10-CM

## 2017-03-29 DIAGNOSIS — F17.200 TOBACCO DEPENDENCE: Chronic | ICD-10-CM

## 2017-03-29 DIAGNOSIS — R09.02 HYPOXEMIA REQUIRING SUPPLEMENTAL OXYGEN: ICD-10-CM

## 2017-03-29 PROCEDURE — 99999 PR PBB SHADOW E&M-EST. PATIENT-LVL IV: CPT | Mod: PBBFAC,,, | Performed by: INTERNAL MEDICINE

## 2017-03-29 PROCEDURE — 99214 OFFICE O/P EST MOD 30 MIN: CPT | Mod: 25,S$PBB,, | Performed by: INTERNAL MEDICINE

## 2017-03-29 PROCEDURE — 99214 OFFICE O/P EST MOD 30 MIN: CPT | Mod: PBBFAC | Performed by: INTERNAL MEDICINE

## 2017-03-29 PROCEDURE — 90670 PCV13 VACCINE IM: CPT | Mod: PBBFAC,SL | Performed by: INTERNAL MEDICINE

## 2017-03-29 RX ORDER — PREDNISONE 10 MG/1
10 TABLET ORAL DAILY
Qty: 30 TABLET | Refills: 0 | Status: SHIPPED | OUTPATIENT
Start: 2017-03-29 | End: 2017-04-28

## 2017-03-29 RX ORDER — ALBUTEROL SULFATE 1.25 MG/3ML
2.5 SOLUTION RESPIRATORY (INHALATION) 3 TIMES DAILY PRN
Qty: 270 VIAL | Refills: 11 | Status: SHIPPED | OUTPATIENT
Start: 2017-03-29 | End: 2018-03-29

## 2017-03-29 NOTE — ASSESSMENT & PLAN NOTE
CAT score 27  mRC score 1  Some chronic wheezing. Out of her Daliresp and albuterol  FEV1 1.43( 53%), FVC 2.95(  86%), FEV1/FVC 49

## 2017-03-29 NOTE — PROGRESS NOTES
Subjective:      Sarah Monahan is a 57 y.o. female who presents to the office today for a preoperative consultation at the request of surgeon LOGAN RADFORD  who plans on performing LITHOTRIPSY on 2017  This consultation is requested for the specific conditions prompting preoperative evaluation (i.e. because of potential affect on operative risk): *  Had x 13 Lithotripsy procedure.  Last major surgery: 2016: ORIF right knee (OLOL)  Planned anesthesia: IV sedation. The patient has no  known anesthesia issues:    Patients bleeding risk: no recent abnormal bleeding.   Patient does not have objections to receiving blood products if needed.  Last steroid burst 6 weeks ago due to COPD flare    Meds reveiwed:  INCRUSE ELLIPTA  ADVAIR 500/50  DALIRESP  Albuterol neb    Had abnormal Chest CT , needs repeat   Smoking cessation discussed    Patient risk factors:  Nocturnal Oxygen  Age> 50  COPD FEV1 1.43( 53%)  Active smoker    The following portions of the patient's history were reviewed and updated as appropriate:   She  has a past medical history of Anemia; Asthma; Bronchitis chronic; Diabetes mellitus; Emphysema of lung; Herniated disc; Hyperlipidemia; Kidney stone; Lung disease; Supraventricular tachycardia; and Urinary tract infection.  She  does not have any pertinent problems on file.  She  has a past surgical history that includes Cervical fusion; Cholecystectomy; Knee arthroscopy w/ ACL reconstruction; Hernia repair;  section, classic; Salpingectomy; and Cystoscopy w/ laser lithotripsy.  Her family history includes Cancer in her maternal grandmother and mother; Diabetes in her father and paternal grandmother; Heart attack (age of onset: 60) in her father; Heart disease in her father; Heart failure in her paternal grandmother; Hypertension in her father.  She  reports that she has been smoking.  She has a 40.00 pack-year smoking history. She has never used smokeless tobacco. She reports  that she does not drink alcohol or use illicit drugs.  She has a current medication list which includes the following prescription(s): advair diskus, albuterol, baclofen, blood sugar diagnostic, blood-glucose meter, clonazepam, inhalation device, quetiapine, ranitidine, roflumilast, tramadol, trazodone, umeclidinium, albuterol, and prednisone.  Current Outpatient Prescriptions on File Prior to Visit   Medication Sig Dispense Refill    ADVAIR DISKUS 500-50 mcg/dose DsDv diskus inhaler INHALE ONE DOSE BY MOUTH TWICE DAILY 180 each 3    albuterol 90 mcg/actuation inhaler Inhale 2 puffs into the lungs every 4 to 6 hours as needed for Wheezing. 8.5 g 11    baclofen (LIORESAL) 10 MG tablet Take 10 mg by mouth.      blood sugar diagnostic Strp 1 each.      blood-glucose meter Misc Use to check blood sugar      clonazePAM (KLONOPIN) 1 MG tablet Take 1 mg by mouth.      inhalation device (VORTEX HOLDING CHAMBER) Use as directed for inhalation. For use with albuterol inhaler. 1 Device prn    quetiapine (SEROQUEL) 50 MG tablet       ranitidine (ZANTAC) 150 MG tablet       roflumilast (DALIRESP) 500 mcg Tab Take 1 tablet (500 mcg total) by mouth once daily. Medically necessary 90 tablet 3    tramadol (ULTRAM) 50 mg tablet TAKE ONE TABLET BY MOUTH EVERY 6 HOURS AS NEEDED FOR PAIN      trazodone (DESYREL) 150 MG tablet       umeclidinium (INCRUSE ELLIPTA) 62.5 mcg/actuation DsDv Inhale 62.5 mcg into the lungs once daily. Medically necessary 30 each 11    [DISCONTINUED] predniSONE (DELTASONE) 20 MG tablet 3 daily x 3 days, 2 daily x 3 days, 1 daily x 3 days, 1/2 daily x 4 days. 20 tablet 0    [DISCONTINUED] albuterol (ACCUNEB) 1.25 mg/3 mL Nebu Take 3 mLs (1.25 mg total) by nebulization 3 (three) times daily as needed. 270 vial 5     No current facility-administered medications on file prior to visit.      She is allergic to macrodantin [nitrofurantoin macrocrystalline]; nitrofurantoin; and nitrofurantoin  "monohyd/m-cryst..    Answers for HPI/ROS submitted by the patient on 3/28/2017   Asthma  In the past 4 weeks, how much of the time did your asthma keep you from getting as much done at work, school, or at home?: none of the time  During the past 4 weeks, how often have you had shortness of breath?: once a day  During the past 4 weeks, how often did your asthma symptoms (Wheezing, coughing, shortness of breath, chest tightness or pain) wake you up at night or earlier that usual in the morning?: not at all  During the past 4 weeks, how often have you used your rescue inhaler or nebulizer medication (such as albuterol)?: once a week or less  How would you rate your asthma control during the past 4 weeks?: well controlled   : 20      Review of Systems  A comprehensive review of systems was negative except for: Constitutional: positive for fatigue  Respiratory: positive for cough      Objective:      /68  Pulse 85  Resp 18  Ht 5' 4" (1.626 m)  Wt 74 kg (163 lb 2.3 oz)  SpO2 96%  BMI 28 kg/m2  General appearance: alert, appears stated age, cooperative and no distress  Head: Normocephalic, without obvious abnormality  Eyes: negative findings: conjunctivae and sclerae normal  Neck: no adenopathy, no carotid bruit, no JVD, supple, symmetrical, trachea midline and thyroid not enlarged, symmetric, no tenderness/mass/nodules  Lungs: clear to auscultation bilaterally, normal percussion bilaterally and scattered wheeze LLL  Heart: regular rate and rhythm, S1, S2 normal, no murmur, click, rub or gallop  Abdomen: soft, non-tender; bowel sounds normal; no masses,  no organomegaly  Extremities: extremities normal, atraumatic, no cyanosis or edema  Pulses: 2+ and symmetric  Skin: Skin color, texture, turgor normal. No rashes or lesions  Lymph nodes: Cervical, supraclavicular, and axillary nodes normal.  Neurologic: Grossly normal    Predictors of intubation difficulty:   Morbid obesity? no   Anatomically abnormal facies? " no   Prominent incisors? no   Receding mandible? no   Short, thick neck? no   Neck range of motion: normal   Mallampati score: II (hard and soft palate, upper portion of tonsils anduvula visible)      SPIROMETRY     Imaging  Chest x-ray: air trapping/emphysema     Lab Review   Arterial Blood Gas result:  pO2 71; pCO2 40.2; pH 7.36;  HCO3 23.0, %O2 Sat 94.     Assessment:        57 y.o. female with planned surgery as above.    Known risk factors for perioperative complications: Chronic pulmonary disease  SMOKING and recent COPD exacebation    Difficulty with intubation is not anticipated.    PULMONARY Risk Estimation:MILD  Her pulmonary status is in chronic stable state  No contraindications for procedure from pulmonary, has tolerated similar before  Continue using nebulizer until surgery    Problem List Items Addressed This Visit     Tobacco dependence (Chronic)     Smoking now 2 months  Ref to smoking cesation         Relevant Orders    Ambulatory referral to Smoking Cessation Program    CT Chest With Contrast    DXA Bone Density Appendicular Skeleton    Hypoxemia requiring supplemental oxygen     2.0 LPM         COPD, moderate - Primary     CAT score 27  mRC score 1  Some chronic wheezing. Out of her Daliresp and albuterol  FEV1 1.43( 53%), FVC 2.95(  86%), FEV1/FVC 49         Relevant Medications    albuterol (ACCUNEB) 1.25 mg/3 mL Nebu    predniSONE (DELTASONE) 10 MG tablet    Other Relevant Orders    Pneumococcal conjugate vaccine 13-valent less than 4yo IM (Completed)    Spirometry with/without bronchodilator    DXA Bone Density Appendicular Skeleton    Osteopenia    Relevant Orders    DXA Bone Density Appendicular Skeleton    Lymphadenopathy, mediastinal    Relevant Orders    CT Chest With Contrast            Plan:      1. Preoperative workup as follows none.  2. Change in medication regimen before surgery: none, continue medication regimen including morning of surgery, with sip of water.  3. Prophylaxis for  cardiac events with perioperative beta-blockers: should be considered, specific regimen per anesthesia.  4. Invasive hemodynamic monitoring perioperatively: at the discretion of anesthesiologist.  5. Deep vein thrombosis prophylaxis postoperatively:intermittent pneumatic compression boots and regimen to be chosen by surgical team.  6. Other measures: Postoperative incentive spirometry to prevent pneumonia.  and preoperative smoking cessation prefered      Return in about 3 months (around 6/29/2017) for david, chest ct and bone density.    This note was prepared using voice recognition system and is likely to have sound alike errors that may have been overlooked even after proof reading.  Please call me with any questions    Discussed diagnosis, its evaluation, treatment and usual course. All questions answered.    David Toscano MD

## 2017-03-29 NOTE — MR AVS SNAPSHOT
[facility]  [address] [facility] [address]  Phone: [phone]  Fax: [phone]                  Sarah Monahan   3/29/2017 3:00 PM   Office Visit    Description:  Female : 1960   Provider:  David Toscano MD   Department:  [facility]           Reason for Visit     COPD           Diagnoses this Visit        Comments    COPD, moderate    -  Primary     Hypoxemia requiring supplemental oxygen         Tobacco dependence         Lymphadenopathy, mediastinal         Osteopenia                To Do List           Future Appointments        Provider Department Dept Phone    2017 2:30 PM [facility] CT1 LIMIT 500 LBS [facility] [phone]    2017 2:50 PM PULMONARY LAB, [facility] [phone]    2017 3:20 PM David Toscano MD [facility] [phone]      Goals (5 Years of Data)     None      Follow-Up and Disposition     Return in about 3 months (around 2017) for david, chest ct and bone density.       These Medications        Disp Refills Start End    albuterol (ACCUNEB) 1.25 mg/3 mL Nebu 270 vial 11 3/29/2017 3/29/2018    Take 6 mLs (2.5 mg total) by nebulization 3 (three) times daily as needed. - Nebulization    Pharmacy: [facility] [address] [place] [address] RD Ph #: [phone]       Notes to Pharmacy: Please call patient to pick prescription once it is ready.    predniSONE (DELTASONE) 10 MG tablet 30 tablet 0 3/29/2017 2017    Take 1 tablet (10 mg total) by mouth once daily. - Oral    Pharmacy: [facility] [address] [place] [address] RD Ph #: [phone]         [facility]     [facility] -  Assistance  Registered nurses in the [facility] provide clinical advisement, health education, appointment booking, and other advisory services.  Call for this free service at  [phone].             Medications           Message regarding Medications     Verify the changes and/or additions to your medication regime listed below are the same as discussed with your clinician today.  If any of these changes or additions are incorrect, please notify your healthcare provider.        START taking these NEW medications        Refills    predniSONE (DELTASONE) 10 MG tablet 0    Sig: Take 1 tablet (10 mg total) by mouth once daily.    Class: Normal    Route: Oral      CHANGE how you are taking these medications     Start Taking Instead of    albuterol (ACCUNEB) 1.25 mg/3 mL Nebu albuterol (ACCUNEB) 1.25 mg/3 mL Nebu    Dosage:  Take 6 mLs (2.5 mg total) by nebulization 3 (three) times daily as needed. Dosage:  Take 3 mLs (1.25 mg total) by nebulization 3 (three) times daily as needed.    Reason for Change:  Reorder            Verify that the below list of medications is an accurate representation of the medications you are currently taking.  If none reported, the list may be blank. If incorrect, please contact your healthcare provider. Carry this list with you in case of emergency.           Current Medications     ADVAIR DISKUS 500-50 mcg/dose DsDv diskus inhaler INHALE ONE DOSE BY MOUTH TWICE DAILY    albuterol (ACCUNEB) 1.25 mg/3 mL Nebu Take 6 mLs (2.5 mg total) by nebulization 3 (three) times daily as needed.    albuterol 90 mcg/actuation inhaler Inhale 2 puffs into the lungs every 4 to 6 hours as needed for Wheezing.    baclofen (LIORESAL) 10 MG tablet Take 10 mg by mouth.    blood sugar diagnostic Strp 1 each.    blood-glucose meter Misc Use to check blood sugar    clonazePAM (KLONOPIN) 1 MG tablet Take 1 mg by mouth.    inhalation device (VORTEX HOLDING CHAMBER) Use as directed for inhalation. For use with albuterol inhaler.    predniSONE (DELTASONE) 10 MG tablet Take 1 tablet (10 mg total) by mouth once daily.    quetiapine (SEROQUEL) 50 MG tablet     ranitidine (ZANTAC) 150 MG  "tablet     roflumilast (DALIRESP) 500 mcg Tab Take 1 tablet (500 mcg total) by mouth once daily. Medically necessary    tramadol (ULTRAM) 50 mg tablet TAKE ONE TABLET BY MOUTH EVERY 6 HOURS AS NEEDED FOR PAIN    trazodone (DESYREL) 150 MG tablet     umeclidinium (INCRUSE ELLIPTA) 62.5 mcg/actuation DsDv Inhale 62.5 mcg into the lungs once daily. Medically necessary           Clinical Reference Information           Your Vitals Were     BP Pulse Resp Height Weight SpO2    130/68 85 18 5' 4" (1.626 m) 74 kg (163 lb 2.3 oz) 96%    BMI                28 kg/m2          Blood Pressure          Most Recent Value    BP  130/68      Allergies as of 3/29/2017     Macrodantin [Nitrofurantoin Macrocrystalline]    Nitrofurantoin    Nitrofurantoin Monohyd/m-cryst      Immunizations Administered on Date of Encounter - 3/29/2017     Name Date Dose VIS Date Route    Pneumococcal Conjugate - 13 Valent 3/29/2017 0.5 mL 11/5/2015 Intramuscular      Orders Placed During Today's Visit      Normal Orders This Visit    Ambulatory referral to Smoking Cessation Program     Pneumococcal conjugate vaccine 13-valent less than 6yo IM     Future Labs/Procedures Expected by Expires    CT Chest With Contrast  3/29/2017 3/29/2018    DXA Bone Density Appendicular Skeleton  3/29/2017 3/29/2018    Spirometry with/without bronchodilator  As directed 3/29/2018      Smoking Cessation     If you would like to quit smoking:   You may be eligible for free services if you are a [place] resident and started smoking cigarettes before September 1, 1988.  Call the Smoking Cessation Trust ([facility]) toll free at [phone] or [phone].   Call 0-426-QUIT-NOW if you do not meet the above criteria.            Language Assistance Services     ATTENTION: Language assistance services are available, free of charge. Please call [phone].      ATENCIÓN: Si habla español, tiene a hernández disposición servicios gratuitos de asistencia lingüística. Llame al " [phone].     TATY Ý: N?u b?n nói Ti?ng Vi?t, có các d?ch v? h? tr? ngôn ng? mi?n phí dành cho b?n. G?i s? [phone].         [facility] complies with applicable Federal civil rights laws and does not discriminate on the basis of race, color, national origin, age, disability, or sex.

## 2017-03-30 ENCOUNTER — TELEPHONE (OUTPATIENT)
Dept: UROLOGY | Facility: CLINIC | Age: 57
End: 2017-03-30

## 2017-03-30 DIAGNOSIS — N20.0 RENAL STONE: Primary | ICD-10-CM

## 2017-03-30 NOTE — TELEPHONE ENCOUNTER
Patient was contacted and informed that her surgery day has been changed to April 4, 2017 as per her request.  Also informed that Dr. Darby will be out of town for the next 3 days after her surgery so if she had any complications, she would have to be referred out to another urologist; pt stated that she understood that and wanted to have surgery on the 4th.  MD informed.

## 2017-03-30 NOTE — TELEPHONE ENCOUNTER
----- Message from Pro Darby IV, MD sent at 3/30/2017  7:19 AM CDT -----  I booked right [facility] for Apr 18, please put in book and get her there...    ----- Message -----     From: Alise Reyes NP     Sent: 3/18/2017   1:08 AM       To: Pro Darby IV, MD    Patient is not cleared for surgery related to presenting with copd exacerbation. Has follow up on 3/29/17 with Dr. Toscano.

## 2017-03-30 NOTE — TELEPHONE ENCOUNTER
Patient called back; informed of surgery date of April 18, 2017 but stated she has an appt in [place] on that day and is requesting an earlier date.

## 2017-03-30 NOTE — TELEPHONE ENCOUNTER
Patient cleared for ESW[facility] per Dr. Toscano's note on 3/29/17. Attempted to reach patient to inform her of surgery date, time, and instructions. No answer; left message to call back.

## 2017-03-31 NOTE — PRE ADMISSION SCREENING
Pre op instructions reviewed with patient per phone:    To confirm, Your surgeon has instructed you:  Surgery is scheduled 04/04/17 at per MD.      [facility] main lobby by per MD.      INSTRUCTIONS IMPORTANT!!!  ¨ Do not eat, drink, or smoke after 12 midnight-including water. OK to brush teeth, no gum, candy or mints!    ¨ Take only these medicines with a small swallow of water-morning of surgery.  Daliresp, Prednisone, use Advair, Accuneb, Albuterol and Incruse         ____  Do not wear makeup, including mascara.  ____  No powder, lotions or creams to surgical area.  ____  Please remove all jewelry, including piercings and leave at home.  ____  No money or valuables needed. Please leave at home.  ____  Please bring identification and insurance information to hospital.  ____  If going home the same day, arrange for a ride home. You will not be able to   drive if Anesthesia was used.  ____  Children, under 12 years old, must remain in the waiting room with an adult.  They are not allowed in patient areas.  ____  Wear loose fitting clothing. Allow for dressings, bandages.  ____  Stop Aspirin, Ibuprofen, Motrin and Aleve at least 5-7 days before surgery, unless otherwise instructed by your doctor, or the nurse.   You MAY use Tylenol/acetaminophen until day of surgery.  ____  If you take diabetic medication, do not take am of surgery unless instructed by   Doctor.  ____ Stop taking any Fish Oil supplement or any Vitamins that contain Vitamin E at least 5 days prior to surgery.          Bathing Instructions-- The night before surgery and the morning prior to coming to the hospital:   -Do not shave the surgical area.   -Shower and wash your hair and body as usual with anti-bacterial  soap and shampoo.   -Rinse your hair and body completely.   -Use one packet of hibiclens to wash the surgical site (using your hand) gently for 5 minutes.  Do not scrub you skin too  hard.   -Do not use hibiclens on your head, face, or genitals.   -Do not wash with anti-bacterial soap after you use the hibiclens.   -Rinse your body thoroughly.   -Dry with clean, soft towel.  Do not use lotion, cream, deodorant, or powders on   the surgical site.    Use antibacterial soap in place of hibiclens if your surgery is on the head, face or genitals.         Surgical Site Infection    Prevention of surgical site infections:     -Keep incisions clean and dry.   -Do not soak/submerge incisions in water until completely healed.   -Do not apply lotions, powders, creams, or deodorants to site.   -Always make sure hands are cleaned with antibacterial soap/ alcohol-based   prior to touching the surgical site.  (This includes doctors, nurses, staff, and yourself.)    Signs and symptoms:   -Redness and pain around the area where you had surgery   -Drainage of cloudy fluid from your surgical wound   -Fever over 100.4  I have read or had read and explained to me, and understand the above information.

## 2017-04-03 ENCOUNTER — PATIENT MESSAGE (OUTPATIENT)
Dept: UROLOGY | Facility: CLINIC | Age: 57
End: 2017-04-03

## 2017-04-03 ENCOUNTER — ANESTHESIA EVENT (OUTPATIENT)
Dept: SURGERY | Facility: HOSPITAL | Age: 57
End: 2017-04-03
Payer: MEDICAID

## 2017-04-04 ENCOUNTER — HOSPITAL ENCOUNTER (OUTPATIENT)
Facility: HOSPITAL | Age: 57
Discharge: HOME | End: 2017-04-04
Attending: UROLOGY | Admitting: UROLOGY
Payer: MEDICAID

## 2017-04-04 ENCOUNTER — SURGERY (OUTPATIENT)
Age: 57
End: 2017-04-04

## 2017-04-04 ENCOUNTER — TELEPHONE (OUTPATIENT)
Dept: UROLOGY | Facility: CLINIC | Age: 57
End: 2017-04-04

## 2017-04-04 ENCOUNTER — ANESTHESIA (OUTPATIENT)
Dept: SURGERY | Facility: HOSPITAL | Age: 57
End: 2017-04-04
Payer: MEDICAID

## 2017-04-04 VITALS
OXYGEN SATURATION: 96 % | DIASTOLIC BLOOD PRESSURE: 71 MMHG | RESPIRATION RATE: 18 BRPM | WEIGHT: 158.5 LBS | SYSTOLIC BLOOD PRESSURE: 158 MMHG | TEMPERATURE: 98 F | HEART RATE: 81 BPM | BODY MASS INDEX: 27.06 KG/M2 | HEIGHT: 64 IN

## 2017-04-04 DIAGNOSIS — N20.0 RENAL STONE: ICD-10-CM

## 2017-04-04 LAB — POCT GLUCOSE: 111 MG/DL (ref 70–110)

## 2017-04-04 PROCEDURE — 37000008 HC ANESTHESIA 1ST 15 MINUTES: Performed by: UROLOGY

## 2017-04-04 PROCEDURE — 63600175 PHARM REV CODE 636 W HCPCS: Performed by: NURSE ANESTHETIST, CERTIFIED REGISTERED

## 2017-04-04 PROCEDURE — 37000009 HC ANESTHESIA EA ADD 15 MINS: Performed by: UROLOGY

## 2017-04-04 PROCEDURE — 25000003 PHARM REV CODE 250: Performed by: NURSE ANESTHETIST, CERTIFIED REGISTERED

## 2017-04-04 PROCEDURE — 36000704 HC OR TIME LEV I 1ST 15 MIN: Performed by: UROLOGY

## 2017-04-04 PROCEDURE — 63600175 PHARM REV CODE 636 W HCPCS: Performed by: UROLOGY

## 2017-04-04 PROCEDURE — 25000003 PHARM REV CODE 250: Performed by: ANESTHESIOLOGY

## 2017-04-04 PROCEDURE — 50590 FRAGMENTING OF KIDNEY STONE: CPT | Mod: RT,,, | Performed by: UROLOGY

## 2017-04-04 PROCEDURE — 71000015 HC POSTOP RECOV 1ST HR: Performed by: UROLOGY

## 2017-04-04 PROCEDURE — 63600175 PHARM REV CODE 636 W HCPCS: Performed by: ANESTHESIOLOGY

## 2017-04-04 PROCEDURE — 71000033 HC RECOVERY, INTIAL HOUR: Performed by: UROLOGY

## 2017-04-04 PROCEDURE — 36000705 HC OR TIME LEV I EA ADD 15 MIN: Performed by: UROLOGY

## 2017-04-04 RX ORDER — MIDAZOLAM HYDROCHLORIDE 1 MG/ML
INJECTION, SOLUTION INTRAMUSCULAR; INTRAVENOUS
Status: DISCONTINUED | OUTPATIENT
Start: 2017-04-04 | End: 2017-04-04

## 2017-04-04 RX ORDER — CIPROFLOXACIN 500 MG/1
500 TABLET ORAL EVERY 12 HOURS
Qty: 6 TABLET | Refills: 0 | Status: SHIPPED | OUTPATIENT
Start: 2017-04-04 | End: 2017-04-07

## 2017-04-04 RX ORDER — FUROSEMIDE 10 MG/ML
INJECTION INTRAMUSCULAR; INTRAVENOUS
Status: DISCONTINUED | OUTPATIENT
Start: 2017-04-04 | End: 2017-04-04

## 2017-04-04 RX ORDER — ACETAMINOPHEN 325 MG/1
650 TABLET ORAL EVERY 4 HOURS PRN
Status: DISCONTINUED | OUTPATIENT
Start: 2017-04-04 | End: 2017-04-04 | Stop reason: HOSPADM

## 2017-04-04 RX ORDER — SODIUM CHLORIDE 9 MG/ML
3 INJECTION, SOLUTION INTRAMUSCULAR; INTRAVENOUS; SUBCUTANEOUS
Status: DISCONTINUED | OUTPATIENT
Start: 2017-04-04 | End: 2017-04-04 | Stop reason: HOSPADM

## 2017-04-04 RX ORDER — PROPOFOL 10 MG/ML
VIAL (ML) INTRAVENOUS
Status: DISCONTINUED | OUTPATIENT
Start: 2017-04-04 | End: 2017-04-04

## 2017-04-04 RX ORDER — MEPERIDINE HYDROCHLORIDE 50 MG/ML
12.5 INJECTION INTRAMUSCULAR; INTRAVENOUS; SUBCUTANEOUS ONCE AS NEEDED
Status: COMPLETED | OUTPATIENT
Start: 2017-04-04 | End: 2017-04-04

## 2017-04-04 RX ORDER — TAMSULOSIN HYDROCHLORIDE 0.4 MG/1
0.4 CAPSULE ORAL DAILY
Qty: 30 CAPSULE | Refills: 1 | Status: SHIPPED | OUTPATIENT
Start: 2017-04-04 | End: 2017-06-30

## 2017-04-04 RX ORDER — HYDROCODONE BITARTRATE AND ACETAMINOPHEN 10; 325 MG/1; MG/1
1 TABLET ORAL EVERY 4 HOURS PRN
Status: DISCONTINUED | OUTPATIENT
Start: 2017-04-04 | End: 2017-04-04 | Stop reason: HOSPADM

## 2017-04-04 RX ORDER — ONDANSETRON 2 MG/ML
4 INJECTION INTRAMUSCULAR; INTRAVENOUS EVERY 12 HOURS PRN
Status: DISCONTINUED | OUTPATIENT
Start: 2017-04-04 | End: 2017-04-04 | Stop reason: HOSPADM

## 2017-04-04 RX ORDER — HYDROCODONE BITARTRATE AND ACETAMINOPHEN 5; 325 MG/1; MG/1
1 TABLET ORAL EVERY 4 HOURS PRN
Status: DISCONTINUED | OUTPATIENT
Start: 2017-04-04 | End: 2017-04-04 | Stop reason: HOSPADM

## 2017-04-04 RX ORDER — FENTANYL CITRATE 50 UG/ML
INJECTION, SOLUTION INTRAMUSCULAR; INTRAVENOUS
Status: DISCONTINUED | OUTPATIENT
Start: 2017-04-04 | End: 2017-04-04

## 2017-04-04 RX ORDER — MORPHINE SULFATE 10 MG/ML
2 INJECTION INTRAMUSCULAR; INTRAVENOUS; SUBCUTANEOUS EVERY 5 MIN PRN
Status: DISCONTINUED | OUTPATIENT
Start: 2017-04-04 | End: 2017-04-04 | Stop reason: HOSPADM

## 2017-04-04 RX ORDER — OXYCODONE HYDROCHLORIDE 5 MG/1
5 TABLET ORAL
Status: DISCONTINUED | OUTPATIENT
Start: 2017-04-04 | End: 2017-04-04 | Stop reason: HOSPADM

## 2017-04-04 RX ORDER — OXYCODONE AND ACETAMINOPHEN 5; 325 MG/1; MG/1
1-2 TABLET ORAL EVERY 4 HOURS PRN
Qty: 30 TABLET | Refills: 0 | Status: SHIPPED | OUTPATIENT
Start: 2017-04-04 | End: 2017-04-10 | Stop reason: SDUPTHER

## 2017-04-04 RX ORDER — SODIUM CHLORIDE, SODIUM LACTATE, POTASSIUM CHLORIDE, CALCIUM CHLORIDE 600; 310; 30; 20 MG/100ML; MG/100ML; MG/100ML; MG/100ML
INJECTION, SOLUTION INTRAVENOUS CONTINUOUS
Status: DISCONTINUED | OUTPATIENT
Start: 2017-04-04 | End: 2017-04-04 | Stop reason: HOSPADM

## 2017-04-04 RX ORDER — ONDANSETRON 2 MG/ML
INJECTION INTRAMUSCULAR; INTRAVENOUS
Status: DISCONTINUED | OUTPATIENT
Start: 2017-04-04 | End: 2017-04-04

## 2017-04-04 RX ORDER — DOCUSATE SODIUM 100 MG/1
100 CAPSULE, LIQUID FILLED ORAL 2 TIMES DAILY
Qty: 60 CAPSULE | Refills: 0 | Status: SHIPPED | OUTPATIENT
Start: 2017-04-04

## 2017-04-04 RX ORDER — CEFAZOLIN SODIUM 2 G/50ML
2 SOLUTION INTRAVENOUS
Status: DISCONTINUED | OUTPATIENT
Start: 2017-04-04 | End: 2017-04-04 | Stop reason: HOSPADM

## 2017-04-04 RX ORDER — LIDOCAINE HYDROCHLORIDE 20 MG/ML
INJECTION, SOLUTION EPIDURAL; INFILTRATION; INTRACAUDAL; PERINEURAL
Status: DISCONTINUED | OUTPATIENT
Start: 2017-04-04 | End: 2017-04-04

## 2017-04-04 RX ADMIN — CEFAZOLIN SODIUM 2 G: 2 SOLUTION INTRAVENOUS at 09:04

## 2017-04-04 RX ADMIN — PROPOFOL 80 MG: 10 INJECTION, EMULSION INTRAVENOUS at 09:04

## 2017-04-04 RX ADMIN — PROPOFOL 50 MG: 10 INJECTION, EMULSION INTRAVENOUS at 09:04

## 2017-04-04 RX ADMIN — FUROSEMIDE 10 MG: 10 INJECTION, SOLUTION INTRAMUSCULAR; INTRAVENOUS at 10:04

## 2017-04-04 RX ADMIN — MORPHINE SULFATE 2 MG: 10 INJECTION, SOLUTION INTRAMUSCULAR; INTRAVENOUS at 10:04

## 2017-04-04 RX ADMIN — FENTANYL CITRATE 100 MCG: 50 INJECTION, SOLUTION INTRAMUSCULAR; INTRAVENOUS at 09:04

## 2017-04-04 RX ADMIN — MORPHINE SULFATE 2 MG: 10 INJECTION, SOLUTION INTRAMUSCULAR; INTRAVENOUS at 11:04

## 2017-04-04 RX ADMIN — PROPOFOL 60 MG: 10 INJECTION, EMULSION INTRAVENOUS at 09:04

## 2017-04-04 RX ADMIN — MIDAZOLAM HYDROCHLORIDE 2 MG: 1 INJECTION, SOLUTION INTRAMUSCULAR; INTRAVENOUS at 09:04

## 2017-04-04 RX ADMIN — LIDOCAINE HYDROCHLORIDE 40 MG: 20 INJECTION, SOLUTION EPIDURAL; INFILTRATION; INTRACAUDAL; PERINEURAL at 09:04

## 2017-04-04 RX ADMIN — SODIUM CHLORIDE, SODIUM LACTATE, POTASSIUM CHLORIDE, AND CALCIUM CHLORIDE: 600; 310; 30; 20 INJECTION, SOLUTION INTRAVENOUS at 09:04

## 2017-04-04 RX ADMIN — ONDANSETRON 4 MG: 2 INJECTION, SOLUTION INTRAMUSCULAR; INTRAVENOUS at 10:04

## 2017-04-04 RX ADMIN — MEPERIDINE HYDROCHLORIDE 12.5 MG: 50 INJECTION INTRAMUSCULAR; INTRAVENOUS; SUBCUTANEOUS at 10:04

## 2017-04-04 RX ADMIN — PROPOFOL 30 MG: 10 INJECTION, EMULSION INTRAVENOUS at 09:04

## 2017-04-04 NOTE — IP AVS SNAPSHOT
[address] [place] [address]           Patient Discharge Instructions   Our goal is to set you up for success. This packet includes information on your condition, medications, and your home care.  It will help you care for yourself to prevent having to return to the hospital.     Please ask your nurse if you have any questions.      There are many details to remember when preparing to leave the hospital. Here is what you will need to do:    1. Take your medicine. If you are prescribed medications, review your Medication List on the following pages. You may have new medications to  at the pharmacy and others that you'll need to stop taking. Review the instructions for how and when to take your medications. Talk with your doctor or nurses if you are unsure of what to do.     2. Go to your follow-up appointments. Specific follow-up information is listed in the following pages. Your may be contacted by a nurse or clinical provider about future appointments. Be sure we have all of the phone numbers to reach you. Please contact your provider's office if you are unable to make an appointment.     3. Watch for warning signs. Your doctor or nurse will give you detailed warning signs to watch for and when to call for assistance. These instructions may also include educational information about your condition. If you experience any of warning signs to your health, call your doctor.               ** Verify the list of medication(s) below is accurate and up to date. Carry this with you in case of emergency. If your medications have changed, please notify your healthcare provider.             Medication List      START taking these medications        Additional Info                      ciprofloxacin HCl 500 MG tablet   Commonly known as:  CIPRO   Quantity:  6 tablet   Refills:  0   Dose:  500 mg    Instructions:  Take 1 tablet (500 mg total) by mouth every 12 (twelve)  hours.     Begin Date    AM    Noon    PM    Bedtime       docusate sodium 100 MG capsule   Commonly known as:  COLACE   Quantity:  60 capsule   Refills:  0   Dose:  100 mg    Instructions:  Take 1 capsule (100 mg total) by mouth 2 (two) times daily.     Begin Date    AM    Noon    PM    Bedtime       oxycodone-acetaminophen 5-325 mg per tablet   Commonly known as:  PERCOCET   Quantity:  30 tablet   Refills:  0   Dose:  1-2 tablet    Instructions:  Take 1-2 tablets by mouth every 4 (four) hours as needed for Pain.     Begin Date    AM    Noon    PM    Bedtime       tamsulosin 0.4 mg Cp24   Commonly known as:  FLOMAX   Quantity:  30 capsule   Refills:  1   Dose:  0.4 mg    Instructions:  Take 1 capsule (0.4 mg total) by mouth once daily.     Begin Date    AM    Noon    PM    Bedtime         CHANGE how you take these medications        Additional Info                      roflumilast 500 mcg Tab   Commonly known as:  DALIRESP   Quantity:  90 tablet   Refills:  3   Dose:  500 mcg   What changed:  additional instructions   Comments:  Please call patient to pick prescription once it is ready.    Instructions:  Take 1 tablet (500 mcg total) by mouth once daily. Medically necessary     Begin Date    AM    Noon    PM    Bedtime         CONTINUE taking these medications        Additional Info                      ADVAIR DISKUS 500-50 mcg/dose Dsdv diskus inhaler   Quantity:  180 each   Refills:  3   Generic drug:  fluticasone-salmeterol 500-50 mcg/dose    Instructions:  INHALE ONE DOSE BY MOUTH TWICE DAILY     Begin Date    AM    Noon    PM    Bedtime       * albuterol 90 mcg/actuation inhaler   Quantity:  8.5 g   Refills:  11   Dose:  2 puff    Instructions:  Inhale 2 puffs into the lungs every 4 to 6 hours as needed for Wheezing.     Begin Date    AM    Noon    PM    Bedtime       * albuterol 1.25 mg/3 mL Nebu   Commonly known as:  ACCUNEB   Quantity:  270 vial   Refills:  11   Dose:  2.5 mg   Comments:  Please call  patient to pick prescription once it is ready.    Instructions:  Take 6 mLs (2.5 mg total) by nebulization 3 (three) times daily as needed.     Begin Date    AM    Noon    PM    Bedtime       baclofen 10 MG tablet   Commonly known as:  LIORESAL   Refills:  0   Dose:  10 mg    Instructions:  Take 10 mg by mouth nightly as needed.     Begin Date    AM    Noon    PM    Bedtime       blood sugar diagnostic Strp   Refills:  0   Dose:  1 each    Instructions:  1 each.     Begin Date    AM    Noon    PM    Bedtime       blood-glucose meter Misc   Refills:  0    Instructions:  Use to check blood sugar     Begin Date    AM    Noon    PM    Bedtime       clonazePAM 1 MG tablet   Commonly known as:  KLONOPIN   Refills:  0   Dose:  1 mg    Instructions:  Take 1 mg by mouth every evening.     Begin Date    AM    Noon    PM    Bedtime       inhalation spacing device   Commonly known as:  VORTEX HOLDING CHAMBER   Quantity:  1 Device   Refills:  prn    Instructions:  Use as directed for inhalation. For use with albuterol inhaler.     Begin Date    AM    Noon    PM    Bedtime       predniSONE 10 MG tablet   Commonly known as:  DELTASONE   Quantity:  30 tablet   Refills:  0   Dose:  10 mg    Instructions:  Take 1 tablet (10 mg total) by mouth once daily.     Begin Date    AM    Noon    PM    Bedtime       quetiapine 50 MG tablet   Commonly known as:  SEROQUEL   Refills:  0   Dose:  50 mg    Instructions:  Take 50 mg by mouth nightly.     Begin Date    AM    Noon    PM    Bedtime       ranitidine 150 MG tablet   Commonly known as:  ZANTAC   Refills:  0   Dose:  300 mg    Instructions:  Take 300 mg by mouth nightly.     Begin Date    AM    Noon    PM    Bedtime       tramadol 50 mg tablet   Commonly known as:  ULTRAM   Refills:  0    Instructions:  TAKE ONE TABLET BY MOUTH EVERY 6 HOURS AS NEEDED FOR PAIN     Begin Date    AM    Noon    PM    Bedtime       trazodone 150 MG tablet   Commonly known as:  DESYREL   Refills:  0   Dose:  150  mg    Instructions:  Take 150 mg by mouth nightly.     Begin Date    AM    Noon    PM    Bedtime       umeclidinium 62.5 mcg/actuation Dsdv   Commonly known as:  INCRUSE ELLIPTA   Quantity:  30 each   Refills:  11   Dose:  62.5 mcg    Instructions:  Inhale 62.5 mcg into the lungs once daily. Medically necessary     Begin Date    AM    Noon    PM    Bedtime       * Notice:  This list has 2 medication(s) that are the same as other medications prescribed for you. Read the directions carefully, and ask your doctor or other care provider to review them with you.         Where to Get Your Medications      These medications were sent to [facility] [address]  [address] [facility] [address]     Phone:  [phone]     ciprofloxacin HCl 500 MG tablet    docusate sodium 100 MG capsule    oxycodone-acetaminophen 5-325 mg per tablet    tamsulosin 0.4 mg Cp24                  Please bring to all follow up appointments:    1. A copy of your discharge instructions.  2. All medicines you are currently taking in their original bottles.  3. Identification and insurance card.    Please arrive 15 minutes ahead of scheduled appointment time.    Please call 24 hours in advance if you must reschedule your appointment and/or time.        Your Scheduled Appointments     Apr 17, 2017  3:00 PM CDT   Bone Density with [facility] BMD1   [facility]    [address] [facility] [address]   [phone]            Apr 25, 2017  8:45 AM CDT   Diagnostic Xray with ON XR1-   [facility]    [address] [place] [id number]   [phone]            May 03, 2017  9:40 AM CDT   Post OP with MD Fili Jaime IV - Urology ([facility])    [address] [place] [id number]   [phone]            Jun 30, 2017  2:30 PM CDT   CT Chest with [facility] CT1 LIMIT 500 LBS   [facility] ([facility])    [address] [place] [address]   [phone]            Jun 30, 2017  2:50 PM CDT   Spirometry with PULMONARY LAB, [facility]- Pulmonary Function [facility] ([facility])    [address] [place] [address]   [phone]                Discharge Instructions     Future Orders    X-Ray Abdomen AP 1 View     Questions:    Reason for Exam:      Is the patient pregnant?:      Activity as tolerated     Call MD for:  persistent nausea and vomiting     Call MD for:  severe uncontrolled pain     Call MD for:  temperature >100.4     Diet general     Questions:    Total calories:      Fat restriction, if any:      Protein restriction, if any:      Na restriction, if any:      Fluid restriction:      Additional restrictions:      No dressing needed         Discharge Instructions         Shock Wave Lithotripsy  Passing a kidney stone can be very painful. Shock wave lithotripsy is a treatment that helps by breaking the kidney stone into smaller pieces that are easier to pass. This treatment is also called extracorporeal shock wave lithotripsy (ESWL). Lithotripsy takes about an hour. Its done in a hospital, lithotripsy center, or mobile lithotripsy van. You will likely go home the same day. This treatment is not used for all types of kidney stones. Your healthcare provider will discuss whether this is the right treatment for the type of stone you have.      Energy waves strike the stone, which begins to crack. The stone crumbles into tiny pieces.   During the procedure  · You get medicine to prevent pain and help you relax or sleep during lithotripsy. Once this takes effect, the procedure will start.  · A stent (flexible tube with holes in it) may be placed into your ureter (the tube that connects the kidney and the bladder). This helps keep urine flowing from the kidney.  · Your healthcare provider then uses X-ray or ultrasound to find the exact location of the kidney  stone.  · Sound waves are aimed at the stone and sent at high speed. If youre awake, you may feel a tapping as they pass through your body.  After the procedure  · Youll be monitored in a recovery room for about 1 hour to 3 hours. Antibiotics and pain medicine may be prescribed before you leave.  · Youll have a follow-up visit in a few weeks. If you received a stent, it will be removed. Your doctor will also check for pieces of stone. If large pieces remain, you may need a second lithotripsy or another procedure.  Possible risks and complications  · Infection  · Bleeding in the kidney  · Bruising of the kidney or skin  · Obstruction (blockage) of the ureter  · Failure to break up the stone (other procedures may be needed)   Passing the stone  It can take a day to several weeks for the pieces of stone to leave your body. Drink plenty of liquids to help flush your system. During this time:  · Your urine may be cloudy or slightly bloody. You may even see small pieces of stone.  · You may have a slight fever and some pain. Take prescribed or over-the-counter pain medication as instructed by your healthcare provider.  · You may be asked to strain your urine to collect some stone particles. These will be studied in the lab.  When to call your healthcare provider   Contact your healthcare provider right away if you have any of the following:  · Fever of 100.4°F (38°C) or higher, or as directed by your healthcare provider  · Heavy bleeding  · Pain that doesnt go away with medication  · Upset stomach and vomiting  · Problems urinating   Date Last Reviewed: 11/26/2014 © 2000-2016 [employer]. [address] [place] [address], [place] [address]. All rights reserved. This information is not intended as a substitute for professional medical care. Always follow your healthcare professional's instructions.      General Information:  1.  Do not drink alcoholic beverages including beer for 24 hours or as long as you are on  pain medication..  2.  Do not drive a motor vehicle, operate machinery or power tools, or signs legal papers for 24 hours or as long as you are on pain medication.   3.  You may experience light-headedness, dizziness, and sleepiness following surgery. Please do not stay alone. A responsible adult should be with you for this 24 hour period.  4.  Go home and rest.  5. Progress slowly to a normal diet unless instructed.  Otherwise, begin with liquids such as soft drinks, then soup and crackers working up to solid foods. Drink plenty of nonalcoholic fluids.  6.  Certain anesthetics and pain medications produce nausea and vomiting in certain       individuals. If nausea becomes a problem at home, call you doctor.  7. A nurse will be calling you sometime after surgery. Do not be alarmed. This is our way of finding out how you are doing.  8. Several times every hour while you are awake, take 2-3 deep breaths and cough. If you had stomach surgery hold a pillow or rolled towel firmly against your stomach before you cough. This will help with any pain the cough might cause.  9. Several times every hour while you are awake, pump and flex your feet 5-6 times and do foot circles. This will help prevent blood clots.  10.Call your doctor for severe pain, bleeding, fever, or signs or symptoms of infection (pain, swelling, redness, foul odor, drainage).  11.You can contact your doctor anytime by [phone] for the [facility] (at [facility]) or [phone] for the [facility] on [facility].   [email or web address] is another way to contact your doctor if you are an active participant online with [facility].          Ciprofloxacin tablets  What is this medicine?  CIPROFLOXACIN (sip sandeep FLOX a sin) is a quinolone antibiotic. It is used to treat certain kinds of bacterial infections. It will not work for colds, flu, or other viral infections.  How should I use this medicine?  Take this medicine by mouth with a glass  of water. Follow the directions on the prescription label. Take your medicine at regular intervals. Do not take your medicine more often than directed. Take all of your medicine as directed even if you think your are better. Do not skip doses or stop your medicine early.  You can take this medicine with food or on an empty stomach. It can be taken with a meal that contains dairy or calcium, but do not take it alone with a dairy product, like milk or yogurt or calcium-fortified juice.  A special MedGuide will be given to you by the pharmacist with each prescription and refill. Be sure to read this information carefully each time.  Talk to your pediatrician regarding the use of this medicine in children. Special care may be needed.  What side effects may I notice from receiving this medicine?  Side effects that you should report to your doctor or health care professional as soon as possible:  · allergic reactions like skin rash or hives, swelling of the face, lips, or tongue  · anxious  · confusion  · depressed mood  · diarrhea  · fast, irregular heartbeat  · hallucination, loss of contact with reality  · joint, muscle, or tendon pain or swelling  · pain, tingling, numbness in the hands or feet  · suicidal thoughts or other mood changes  · sunburn  · unusually weak or tired  Side effects that usually do not require medical attention (report to your doctor or health care professional if they continue or are bothersome):  · dry mouth  · headache  · nausea  · trouble sleeping  What may interact with this medicine?  Do not take this medicine with any of the following medications:  · cisapride  · droperidol  · terfenadine  · tizanidine  This medicine may also interact with the following medications:  · antacids  · birth control pills  · caffeine  · cyclosporin  · didanosine (ddI) buffered tablets or powder  · medicines for diabetes  · medicines for inflammation like ibuprofen,  naproxen  · methotrexate  · multivitamins  · omeprazole  · phenytoin  · probenecid  · sucralfate  · theophylline  · warfarin  What if I miss a dose?  If you miss a dose, take it as soon as you can. If it is almost time for your next dose, take only that dose. Do not take double or extra doses.  Where should I keep my medicine?  Keep out of the reach of children.  Store at room temperature below 30 degrees C (86 degrees F). Keep container tightly closed. Throw away any unused medicine after the expiration date.  What should I tell my health care provider before I take this medicine?  They need to know if you have any of these conditions:  · bone problems  · cerebral disease  · history of low levels of potassium in the blood  · joint problems  · irregular heartbeat  · kidney disease  · myasthenia gravis  · seizures  · tendon problems  · tingling of the fingers or toes, or other nerve disorder  · an unusual or allergic reaction to ciprofloxacin, other antibiotics or medicines, foods, dyes, or preservatives  · pregnant or trying to get pregnant  · breast-feeding  What should I watch for while using this medicine?  Tell your doctor or health care professional if your symptoms do not improve.  Do not treat diarrhea with over the counter products. Contact your doctor if you have diarrhea that lasts more than 2 days or if it is severe and watery.  You may get drowsy or dizzy. Do not drive, use machinery, or do anything that needs mental alertness until you know how this medicine affects you. Do not stand or sit up quickly, especially if you are an older patient. This reduces the risk of dizzy or fainting spells.  This medicine can make you more sensitive to the sun. Keep out of the sun. If you cannot avoid being in the sun, wear protective clothing and use sunscreen. Do not use sun lamps or tanning beds/booths.  Avoid antacids, aluminum, calcium, iron, magnesium, and zinc products for 6 hours before and 2 hours after taking  a dose of this medicine.  Date Last Reviewed:   NOTE:This sheet is a summary. It may not cover all possible information. If you have questions about this medicine, talk to your doctor, pharmacist, or health care provider. Copyright© 2016 [employer]      Docusate Sodium; Senna tablets or capsules  What is this medicine?  DOCUSATE SODIUM; SENNA (doc CUE sayt LUISA louis um; SEN na) contains a stool softener and a laxative. It is used to treat constipation.  How should I use this medicine?  Take this medicine by mouth with a full glass of water. Follow the directions on the label. Take your doses at regular intervals. Do not take your medicine more often than directed.  Talk to your pediatrician regarding the use of this medicine in children. While this medicine may be prescribed for children as young as 2 years for selected conditions, precautions do apply.  What side effects may I notice from receiving this medicine?  Side effects that you should report to your doctor or health care professional as soon as possible:  · allergic reactions like skin rash, itching or hives, swelling of the face, lips, or tongue  · muscle weakness  · unusually weak or tired  · unusual weight loss  Side effects that usually do not require medical attention (report to your doctor or health care professional if they continue or are bothersome):  · diarrhea  · discolored urine  · nausea, vomiting  · stomach cramps  · throat irritation  What may interact with this medicine?  · mineral oil  What if I miss a dose?  If you miss a dose, take it as soon as you can. If it is almost time for your next dose, take only that dose. Do not take double or extra doses.  Where should I keep my medicine?  Keep out of the reach of children.  Store at room temperature between 15 and 30 degrees C (59 and 86 degrees F). Throw away any unused medicine after the expiration date.  What should I tell my health care provider before I take this medicine?  They need to  know if you have any of these conditions:  · nausea or vomiting  · severe constipation  · stomach pain  · sudden change in bowel habit lasting more than 2 weeks  · an unusual or allergic reaction to docusate, senna, other medicines, foods, dyes, or preservatives  · pregnant or trying to get pregnant  · breast-feeding  What should I watch for while using this medicine?  Do not use for more than one week without advice from your doctor or health care professional. Long-term use can make your body depend on the laxative for regular bowel movements, damage the bowel, cause malnutrition, and problems with the amounts of water and salts in your body. If your constipation keeps returning, check with your doctor or health care professional.  Drink plenty of water while taking this medicine. This will help fight constipation.  Stop using this medicine and contact your doctor or health care professional if you experience any rectal bleeding or do not have a bowel movement after use. These could be signs of a more serious condition.  Date Last Reviewed:   NOTE:This sheet is a summary. It may not cover all possible information. If you have questions about this medicine, talk to your doctor, pharmacist, or health care provider. Copyright© 2016 [employer]      Acetaminophen; Oxycodone tablets  What is this medicine?  ACETAMINOPHEN; OXYCODONE (a set a ARTURO reinier fen; ox i KOE done) is a pain reliever. It is used to treat moderate to severe pain.  How should I use this medicine?  Take this medicine by mouth with a full glass of water. Follow the directions on the prescription label. You can take it with or without food. If it upsets your stomach, take it with food. Take your medicine at regular intervals. Do not take it more often than directed.  A special MedGuide will be given to you by the pharmacist with each prescription and refill. Be sure to read this information carefully each time.  Talk to your pediatrician regarding the  use of this medicine in children. Special care may be needed.  What side effects may I notice from receiving this medicine?  Side effects that you should report to your doctor or health care professional as soon as possible:  · allergic reactions like skin rash, itching or hives, swelling of the face, lips, or tongue  · breathing problems  · confusion  · redness, blistering, peeling or loosening of the skin, including inside the mouth  · signs and symptoms of liver injury like dark yellow or brown urine; general ill feeling or flu-like symptoms; light-colored stools; loss of appetite; nausea; right upper belly pain; unusually weak or tired; yellowing of the eyes or skin  · signs and symptoms of low blood pressure like dizziness; feeling faint or lightheaded, falls; unusually weak or tired  · trouble passing urine or change in the amount of urine  Side effects that usually do not require medical attention (report to your doctor or health care professional if they continue or are bothersome):  · constipation  · dry mouth  · nausea, vomiting  · tiredness  What may interact with this medicine?  This medicine may interact with the following medications:  · alcohol  · antihistamines for allergy, cough and cold  · antiviral medicines for HIV or AIDS  · atropine  · certain antibiotics like clarithromycin, erythromycin, linezolid, rifampin  · certain medicines for anxiety or sleep  · certain medicines for bladder problems like oxybutynin, tolterodine  · certain medicines for depression like amitriptyline, fluoxetine, sertraline  · certain medicines for fungal infections like ketoconazole, itraconazole, voriconazole  · certain medicines for migraine headache like almotriptan, eletriptan, frovatriptan, naratriptan, rizatriptan, sumatriptan, zolmitriptan  · certain medicines for nausea or vomiting like dolasetron, ondansetron, palonosetron  · certain medicines for Parkinson's disease like benztropine, trihexyphenidyl  · certain  medicines for seizures like phenobarbital, phenytoin, primidone  · certain medicines for stomach problems like dicyclomine, hyoscyamine  · certain medicines for travel sickness like scopolamine  · diuretics  · general anesthetics like halothane, isoflurane, methoxyflurane, propofol  · ipratropium  · local anesthetics like lidocaine, pramoxine, tetracaine  · MAOIs like Carbex, Eldepryl, Marplan, Nardil, and Parnate  · medicines that relax muscles for surgery  · methylene blue  · nilotinib  · other medicines with acetaminophen  · other narcotic medicines for pain or cough  · phenothiazines like chlorpromazine, mesoridazine, prochlorperazine, thioridazine  What if I miss a dose?  If you miss a dose, take it as soon as you can. If it is almost time for your next dose, take only that dose. Do not take double or extra doses.  Where should I keep my medicine?  Keep out of the reach of children. This medicine can be abused. Keep your medicine in a safe place to protect it from theft. Do not share this medicine with anyone. Selling or giving away this medicine is dangerous and against the law.  This medicine may cause accidental overdose and death if it taken by other adults, children, or pets. Mix any unused medicine with a substance like cat litter or coffee grounds. Then throw the medicine away in a sealed container like a sealed bag or a coffee can with a lid. Do not use the medicine after the expiration date.  Store at room temperature between 20 and 25 degrees C (68 and 77 degrees F).  What should I tell my health care provider before I take this medicine?  They need to know if you have any of these conditions:  · brain tumor  · Crohn's disease, inflammatory bowel disease, or ulcerative colitis  · drug abuse or addiction  · head injury  · heart or circulation problems  · if you often drink alcohol  · kidney disease or problems going to the bathroom  · liver disease  · lung disease, asthma, or breathing problems  · an  unusual or allergic reaction to acetaminophen, oxycodone, other opioid analgesics, other medicines, foods, dyes, or preservatives  · pregnant or trying to get pregnant  · breast-feeding  What should I watch for while using this medicine?  Tell your doctor or health care professional if your pain does not go away, if it gets worse, or if you have new or a different type of pain. You may develop tolerance to the medicine. Tolerance means that you will need a higher dose of the medication for pain relief. Tolerance is normal and is expected if you take this medicine for a long time.  Do not suddenly stop taking your medicine because you may develop a severe reaction. Your body becomes used to the medicine. This does NOT mean you are addicted. Addiction is a behavior related to getting and using a drug for a non-medical reason. If you have pain, you have a medical reason to take pain medicine. Your doctor will tell you how much medicine to take. If your doctor wants you to stop the medicine, the dose will be slowly lowered over time to avoid any side effects.  There are different types of narcotic medicines (opiates). If you take more than one type at the same time or if you are taking another medicine that also causes drowsiness, you may have more side effects. Give your health care provider a list of all medicines you use. Your doctor will tell you how much medicine to take. Do not take more medicine than directed. Call emergency for help if you have problems breathing or unusual sleepiness.  Do not take other medicines that contain acetaminophen with this medicine. Always read labels carefully. If you have questions, ask your doctor or pharmacist.  If you take too much acetaminophen get medical help right away. Too much acetaminophen can be very dangerous and cause liver damage. Even if you do not have symptoms, it is important to get help right away.  You may get drowsy or dizzy. Do not drive, use machinery, or do  anything that needs mental alertness until you know how this medicine affects you. Do not stand or sit up quickly, especially if you are an older patient. This reduces the risk of dizzy or fainting spells. Alcohol may interfere with the effect of this medicine. Avoid alcoholic drinks.  The medicine will cause constipation. Try to have a bowel movement at least every 2 to 3 days. If you do not have a bowel movement for 3 days, call your doctor or health care professional.  Your mouth may get dry. Chewing sugarless gum or sucking hard candy, and drinking plenty or water may help. Contact your doctor if the problem does not go away or is severe.  Date Last Reviewed:   NOTE:This sheet is a summary. It may not cover all possible information. If you have questions about this medicine, talk to your doctor, pharmacist, or health care provider. Copyright© 2016 [employer]        Tamsulosin capsules  What is this medicine?  TAMSULOSIN (diane GOPAL mary beth sin) is used to treat enlargement of the prostate gland in men, a condition called benign prostatic hyperplasia or BPH. It is not for use in women. It works by relaxing muscles in the prostate and bladder neck. This improves urine flow and reduces BPH symptoms.  How should I use this medicine?  Take this medicine by mouth about 30 minutes after the same meal every day. Follow the directions on the prescription label. Swallow the capsules whole with a glass of water. Do not crush, chew, or open capsules. Do not take your medicine more often than directed. Do not stop taking your medicine unless your doctor tells you to.  Talk to your pediatrician regarding the use of this medicine in children. Special care may be needed.  What side effects may I notice from receiving this medicine?  Side effects that you should report to your doctor or health care professional as soon as possible:  · allergic reactions like skin rash or itching, hives, swelling of the lips, mouth, tongue, or  throat  · breathing problems  · change in vision  · feeling faint or lightheaded  · irregular heartbeat  · prolonged or painful erection  · weakness  Side effects that usually do not require medical attention (report to your doctor or health care professional if they continue or are bothersome):  · back pain  · change in sex drive or performance  · constipation, nausea or vomiting  · cough  · drowsy  · runny or stuffy nose  · trouble sleeping  What may interact with this medicine?  · cimetidine  · fluoxetine  · ketoconazole  · medicines for erectile disfunction like sildenafil, tadalafil, vardenafil  · medicines for high blood pressure  · other alpha-blockers like alfuzosin, doxazosin, phentolamine, phenoxybenzamine, prazosin, terazosin  · warfarin  What if I miss a dose?  If you miss a dose, take it as soon as you can. If it is almost time for your next dose, take only that dose. Do not take double or extra doses. If you stop taking your medicine for several days or more, ask your doctor or health care professional what dose you should start back on.  Where should I keep my medicine?  Keep out of the reach of children.  Store at room temperature between 15 and 30 degrees C (59 and 86 degrees F). Throw away any unused medicine after the expiration date.  What should I tell my health care provider before I take this medicine?  They need to know if you have any of the following conditions:  · advanced kidney disease  · advanced liver disease  · low blood pressure  · prostate cancer  · an unusual or allergic reaction to tamsulosin, sulfa drugs, other medicines, foods, dyes, or preservatives  · pregnant or trying to get pregnant  · breast-feeding  What should I watch for while using this medicine?  Visit your doctor or health care professional for regular check ups. You will need lab work done before you start this medicine and regularly while you are taking it. Check your blood pressure as directed. Ask your health  "care professional what your blood pressure should be, and when you should contact him or her.  This medicine may make you feel dizzy or lightheaded. This is more likely to happen after the first dose, after an increase in dose, or during hot weather or exercise. Drinking alcohol and taking some medicines can make this worse. Do not drive, use machinery, or do anything that needs mental alertness until you know how this medicine affects you. Do not sit or stand up quickly. If you begin to feel dizzy, sit down until you feel better. These effects can decrease once your body adjusts to the medicine.  Contact your doctor or health care professional right away if you have an erection that lasts longer than 4 hours or if it becomes painful. This may be a sign of a serious problem and must be treated right away to prevent permanent damage.  If you are thinking of having cataract surgery, tell your eye surgeon that you have taken this medicine.  Date Last Reviewed:   NOTE:This sheet is a summary. It may not cover all possible information. If you have questions about this medicine, talk to your doctor, pharmacist, or health care provider. Copyright© 2016 [employer]                    Primary Diagnosis     Your primary diagnosis was:  Kidney Stone      Admission Information     Date & Time Provider Department CSN    4/4/2017  7:22 AM Pro Darby IV, MD [facility] [place] [id number]      Care Providers     Provider Role Specialty Primary office phone    Pro Darby IV, MD Attending Provider Urology [phone]    Pro Darby IV, MD Surgeon  Urology [phone]      Your Vitals Were     BP Pulse Temp Resp Height Weight    174/84 67 97.9 °F (36.6 °C) (Temporal) 9 5' 4" (1.626 m) 71.9 kg (158 lb 8.2 oz)    SpO2 BMI             100% 27.21 kg/m2         Recent Lab Values     No lab values to display.      Allergies as of 4/4/2017        Reactions    Macrodantin [Nitrofurantoin Macrocrystalline] Hives, " Itching    Nitrofurantoin Hives, Itching    Nitrofurantoin Monohyd/m-cryst Hives, Itching      [facility]     [facility] Care Line - 24/7 Assistance  Unless otherwise directed by your provider, please contact [facility], our nurse care line that is available for 24/7 assistance.     Registered nurses in the [facility] provide clinical advisement, health education, appointment booking, and other advisory services.  Call for this free service at [phone].        Advance Directives     An advance directive is a document which, in the event you are no longer able to make decisions for yourself, tells your healthcare team what kind of treatment you do or do not want to receive, or who you would like to make those decisions for you.  If you do not currently have an advance directive, [facility] encourages you to create one.  For more information call:  [phone]), [phone] ([phone]),  or log on to [email or web address].        Smoking Cessation     If you would like to quit smoking:   You may be eligible for free services if you are a [place] resident and started smoking cigarettes before September 1, 1988.  Call the [employer]oking [employer]essation [employer] ([employer]) toll free at (618) 358-3839 or (093) 545-8894.   Call -929-QUIT-NOW if you do not meet the above criteria.   Contact us via email: [email or web address]   View our website for more information: [email or web address]        Language Assistance Services     ATTENTION: Language assistance services are available, free of charge. Please call [phone].      ATENCIÓN: Si habla español, tiene a hernández disposición servicios gratuitos de asistencia lingüística. Llame al [phone].     CHÚ Ý: N?u b?n nói Ti?ng Vi?t, có các d?ch v? h? tr? ngôn ng? mi?n phí dành cho b?n. G?i s? [phone].         [facility] -  complies with applicable Federal civil rights laws and does not discriminate on  the basis of race, color, national origin, age, disability, or sex.

## 2017-04-04 NOTE — DISCHARGE SUMMARY
Date of Discharge: 04/04/2017     Principle Diagnosis: Right renal stone    Secondary Diagnosis:  has a past medical history of Acid reflux; Anemia; Asthma; Bronchitis chronic; Diabetes mellitus; DVT, lower extremity; Emphysema of lung; Hepatitis B; Hepatitis C; Herniated disc; Hyperlipidemia; Kidney stone; Lung disease; Oxygen dependent; Supraventricular tachycardia; and Urinary tract infection.    History of Present Illness: Pt was worked up in clinic and today's procedure was scheduled.  Please see H&P for full details.    Hospital Course: Pt presented on the day of surgery and after proper consents were obtained he was brought to the OR where his procedure was performed without difficulty.    Discharge Disposition: Home    Followup Plan:  1. Pt is provided with medications for pain and others as indicated.  2. RTC in 3 weeks

## 2017-04-04 NOTE — ANESTHESIA PREPROCEDURE EVALUATION
04/04/2017  Sarah Monahan is a 57 y.o., female.    [facility] Anesthesia Evaluation    I have reviewed the Patient Summary Reports.    I have reviewed the Nursing Notes.   I have reviewed the Medications.     Review of Systems  Anesthesia Hx:  No problems with previous Anesthesia  History of prior surgery of interest to airway management or planning: Denies Family Hx of Anesthesia complications.   Denies Personal Hx of Anesthesia complications.   Social:  Smoker    Pulmonary:   Asthma    Hepatic/GI:   GERD    Endocrine:   Denies Diabetes.        Physical Exam  General:  Well nourished    Airway/Jaw/Neck:  Airway Findings: Mallampati: II     Dental:  DENTAL FINDINGS: Normal   Chest/Lungs:  Chest/Lungs Findings: Normal Respiratory Rate     Heart/Vascular:  Heart Findings: Normal            Anesthesia Plan  Type of Anesthesia, risks & benefits discussed:  Anesthesia Type:  MAC  Patient's Preference:   Intra-op Monitoring Plan:   Intra-op Monitoring Plan Comments:   Post Op Pain Control Plan:   Post Op Pain Control Plan Comments:   Induction:    Beta Blocker:  Patient is not currently on a Beta-Blocker (No further documentation required).       Informed Consent: Patient understands risks and agrees with Anesthesia plan.  Questions answered. Anesthesia consent signed with patient.  ASA Score: 2     Day of Surgery Review of History & Physical: I have interviewed and examined the patient. I have reviewed the patient's H&P dated:  There are no significant changes.          Ready For Surgery From Anesthesia Perspective.

## 2017-04-04 NOTE — DISCHARGE INSTRUCTIONS
Shock Wave Lithotripsy  Passing a kidney stone can be very painful. Shock wave lithotripsy is a treatment that helps by breaking the kidney stone into smaller pieces that are easier to pass. This treatment is also called extracorporeal shock wave lithotripsy (ESWL). Lithotripsy takes about an hour. Its done in a hospital, lithotripsy center, or mobile lithotripsy van. You will likely go home the same day. This treatment is not used for all types of kidney stones. Your healthcare provider will discuss whether this is the right treatment for the type of stone you have.      Energy waves strike the stone, which begins to crack. The stone crumbles into tiny pieces.   During the procedure  · You get medicine to prevent pain and help you relax or sleep during lithotripsy. Once this takes effect, the procedure will start.  · A stent (flexible tube with holes in it) may be placed into your ureter (the tube that connects the kidney and the bladder). This helps keep urine flowing from the kidney.  · Your healthcare provider then uses X-ray or ultrasound to find the exact location of the kidney stone.  · Sound waves are aimed at the stone and sent at high speed. If youre awake, you may feel a tapping as they pass through your body.  After the procedure  · Youll be monitored in a recovery room for about 1 hour to 3 hours. Antibiotics and pain medicine may be prescribed before you leave.  · Youll have a follow-up visit in a few weeks. If you received a stent, it will be removed. Your doctor will also check for pieces of stone. If large pieces remain, you may need a second lithotripsy or another procedure.  Possible risks and complications  · Infection  · Bleeding in the kidney  · Bruising of the kidney or skin  · Obstruction (blockage) of the ureter  · Failure to break up the stone (other procedures may be needed)   Passing the stone  It can take a day to several weeks for the pieces of stone to leave your body. Drink  plenty of liquids to help flush your system. During this time:  · Your urine may be cloudy or slightly bloody. You may even see small pieces of stone.  · You may have a slight fever and some pain. Take prescribed or over-the-counter pain medication as instructed by your healthcare provider.  · You may be asked to strain your urine to collect some stone particles. These will be studied in the lab.  When to call your healthcare provider   Contact your healthcare provider right away if you have any of the following:  · Fever of 100.4°F (38°C) or higher, or as directed by your healthcare provider  · Heavy bleeding  · Pain that doesnt go away with medication  · Upset stomach and vomiting  · Problems urinating   Date Last Reviewed: 11/26/2014 © 2000-2016 [employer]. [address] [place] [address], [place] [address]. All rights reserved. This information is not intended as a substitute for professional medical care. Always follow your healthcare professional's instructions.      General Information:  1.  Do not drink alcoholic beverages including beer for 24 hours or as long as you are on pain medication..  2.  Do not drive a motor vehicle, operate machinery or power tools, or signs legal papers for 24 hours or as long as you are on pain medication.   3.  You may experience light-headedness, dizziness, and sleepiness following surgery. Please do not stay alone. A responsible adult should be with you for this 24 hour period.  4.  Go home and rest.  5. Progress slowly to a normal diet unless instructed.  Otherwise, begin with liquids such as soft drinks, then soup and crackers working up to solid foods. Drink plenty of nonalcoholic fluids.  6.  Certain anesthetics and pain medications produce nausea and vomiting in certain       individuals. If nausea becomes a problem at home, call you doctor.  7. A nurse will be calling you sometime after surgery. Do not be alarmed. This is our way of finding out how you are  doing.  8. Several times every hour while you are awake, take 2-3 deep breaths and cough. If you had stomach surgery hold a pillow or rolled towel firmly against your stomach before you cough. This will help with any pain the cough might cause.  9. Several times every hour while you are awake, pump and flex your feet 5-6 times and do foot circles. This will help prevent blood clots.  10.Call your doctor for severe pain, bleeding, fever, or signs or symptoms of infection (pain, swelling, redness, foul odor, drainage).  11.You can contact your doctor anytime by [phone] for the [facility] (at [facility]) or [phone] for the [facility] on [facility].   [email or web address] is another way to contact your doctor if you are an active participant online with [facility].          Ciprofloxacin tablets  What is this medicine?  CIPROFLOXACIN (sip sandeep FLOX a sin) is a quinolone antibiotic. It is used to treat certain kinds of bacterial infections. It will not work for colds, flu, or other viral infections.  How should I use this medicine?  Take this medicine by mouth with a glass of water. Follow the directions on the prescription label. Take your medicine at regular intervals. Do not take your medicine more often than directed. Take all of your medicine as directed even if you think your are better. Do not skip doses or stop your medicine early.  You can take this medicine with food or on an empty stomach. It can be taken with a meal that contains dairy or calcium, but do not take it alone with a dairy product, like milk or yogurt or calcium-fortified juice.  A special MedGuide will be given to you by the pharmacist with each prescription and refill. Be sure to read this information carefully each time.  Talk to your pediatrician regarding the use of this medicine in children. Special care may be needed.  What side effects may I notice from receiving this medicine?  Side effects that you should  report to your doctor or health care professional as soon as possible:  · allergic reactions like skin rash or hives, swelling of the face, lips, or tongue  · anxious  · confusion  · depressed mood  · diarrhea  · fast, irregular heartbeat  · hallucination, loss of contact with reality  · joint, muscle, or tendon pain or swelling  · pain, tingling, numbness in the hands or feet  · suicidal thoughts or other mood changes  · sunburn  · unusually weak or tired  Side effects that usually do not require medical attention (report to your doctor or health care professional if they continue or are bothersome):  · dry mouth  · headache  · nausea  · trouble sleeping  What may interact with this medicine?  Do not take this medicine with any of the following medications:  · cisapride  · droperidol  · terfenadine  · tizanidine  This medicine may also interact with the following medications:  · antacids  · birth control pills  · caffeine  · cyclosporin  · didanosine (ddI) buffered tablets or powder  · medicines for diabetes  · medicines for inflammation like ibuprofen, naproxen  · methotrexate  · multivitamins  · omeprazole  · phenytoin  · probenecid  · sucralfate  · theophylline  · warfarin  What if I miss a dose?  If you miss a dose, take it as soon as you can. If it is almost time for your next dose, take only that dose. Do not take double or extra doses.  Where should I keep my medicine?  Keep out of the reach of children.  Store at room temperature below 30 degrees C (86 degrees F). Keep container tightly closed. Throw away any unused medicine after the expiration date.  What should I tell my health care provider before I take this medicine?  They need to know if you have any of these conditions:  · bone problems  · cerebral disease  · history of low levels of potassium in the blood  · joint problems  · irregular heartbeat  · kidney disease  · myasthenia gravis  · seizures  · tendon problems  · tingling of the fingers or  toes, or other nerve disorder  · an unusual or allergic reaction to ciprofloxacin, other antibiotics or medicines, foods, dyes, or preservatives  · pregnant or trying to get pregnant  · breast-feeding  What should I watch for while using this medicine?  Tell your doctor or health care professional if your symptoms do not improve.  Do not treat diarrhea with over the counter products. Contact your doctor if you have diarrhea that lasts more than 2 days or if it is severe and watery.  You may get drowsy or dizzy. Do not drive, use machinery, or do anything that needs mental alertness until you know how this medicine affects you. Do not stand or sit up quickly, especially if you are an older patient. This reduces the risk of dizzy or fainting spells.  This medicine can make you more sensitive to the sun. Keep out of the sun. If you cannot avoid being in the sun, wear protective clothing and use sunscreen. Do not use sun lamps or tanning beds/booths.  Avoid antacids, aluminum, calcium, iron, magnesium, and zinc products for 6 hours before and 2 hours after taking a dose of this medicine.  Date Last Reviewed:   NOTE:This sheet is a summary. It may not cover all possible information. If you have questions about this medicine, talk to your doctor, pharmacist, or health care provider. Copyright© 2016 [employer]      Docusate Sodium; Senna tablets or capsules  What is this medicine?  DOCUSATE SODIUM; SENNA (doc CUE ivonne myers um; SEN na) contains a stool softener and a laxative. It is used to treat constipation.  How should I use this medicine?  Take this medicine by mouth with a full glass of water. Follow the directions on the label. Take your doses at regular intervals. Do not take your medicine more often than directed.  Talk to your pediatrician regarding the use of this medicine in children. While this medicine may be prescribed for children as young as 2 years for selected conditions, precautions do apply.  What  side effects may I notice from receiving this medicine?  Side effects that you should report to your doctor or health care professional as soon as possible:  · allergic reactions like skin rash, itching or hives, swelling of the face, lips, or tongue  · muscle weakness  · unusually weak or tired  · unusual weight loss  Side effects that usually do not require medical attention (report to your doctor or health care professional if they continue or are bothersome):  · diarrhea  · discolored urine  · nausea, vomiting  · stomach cramps  · throat irritation  What may interact with this medicine?  · mineral oil  What if I miss a dose?  If you miss a dose, take it as soon as you can. If it is almost time for your next dose, take only that dose. Do not take double or extra doses.  Where should I keep my medicine?  Keep out of the reach of children.  Store at room temperature between 15 and 30 degrees C (59 and 86 degrees F). Throw away any unused medicine after the expiration date.  What should I tell my health care provider before I take this medicine?  They need to know if you have any of these conditions:  · nausea or vomiting  · severe constipation  · stomach pain  · sudden change in bowel habit lasting more than 2 weeks  · an unusual or allergic reaction to docusate, senna, other medicines, foods, dyes, or preservatives  · pregnant or trying to get pregnant  · breast-feeding  What should I watch for while using this medicine?  Do not use for more than one week without advice from your doctor or health care professional. Long-term use can make your body depend on the laxative for regular bowel movements, damage the bowel, cause malnutrition, and problems with the amounts of water and salts in your body. If your constipation keeps returning, check with your doctor or health care professional.  Drink plenty of water while taking this medicine. This will help fight constipation.  Stop using this medicine and contact your  doctor or health care professional if you experience any rectal bleeding or do not have a bowel movement after use. These could be signs of a more serious condition.  Date Last Reviewed:   NOTE:This sheet is a summary. It may not cover all possible information. If you have questions about this medicine, talk to your doctor, pharmacist, or health care provider. Copyright© 2016 [employer]      Acetaminophen; Oxycodone tablets  What is this medicine?  ACETAMINOPHEN; OXYCODONE (a set a ARTURO reinier fen; ox i KOE done) is a pain reliever. It is used to treat moderate to severe pain.  How should I use this medicine?  Take this medicine by mouth with a full glass of water. Follow the directions on the prescription label. You can take it with or without food. If it upsets your stomach, take it with food. Take your medicine at regular intervals. Do not take it more often than directed.  A special MedGuide will be given to you by the pharmacist with each prescription and refill. Be sure to read this information carefully each time.  Talk to your pediatrician regarding the use of this medicine in children. Special care may be needed.  What side effects may I notice from receiving this medicine?  Side effects that you should report to your doctor or health care professional as soon as possible:  · allergic reactions like skin rash, itching or hives, swelling of the face, lips, or tongue  · breathing problems  · confusion  · redness, blistering, peeling or loosening of the skin, including inside the mouth  · signs and symptoms of liver injury like dark yellow or brown urine; general ill feeling or flu-like symptoms; light-colored stools; loss of appetite; nausea; right upper belly pain; unusually weak or tired; yellowing of the eyes or skin  · signs and symptoms of low blood pressure like dizziness; feeling faint or lightheaded, falls; unusually weak or tired  · trouble passing urine or change in the amount of urine  Side effects  that usually do not require medical attention (report to your doctor or health care professional if they continue or are bothersome):  · constipation  · dry mouth  · nausea, vomiting  · tiredness  What may interact with this medicine?  This medicine may interact with the following medications:  · alcohol  · antihistamines for allergy, cough and cold  · antiviral medicines for HIV or AIDS  · atropine  · certain antibiotics like clarithromycin, erythromycin, linezolid, rifampin  · certain medicines for anxiety or sleep  · certain medicines for bladder problems like oxybutynin, tolterodine  · certain medicines for depression like amitriptyline, fluoxetine, sertraline  · certain medicines for fungal infections like ketoconazole, itraconazole, voriconazole  · certain medicines for migraine headache like almotriptan, eletriptan, frovatriptan, naratriptan, rizatriptan, sumatriptan, zolmitriptan  · certain medicines for nausea or vomiting like dolasetron, ondansetron, palonosetron  · certain medicines for Parkinson's disease like benztropine, trihexyphenidyl  · certain medicines for seizures like phenobarbital, phenytoin, primidone  · certain medicines for stomach problems like dicyclomine, hyoscyamine  · certain medicines for travel sickness like scopolamine  · diuretics  · general anesthetics like halothane, isoflurane, methoxyflurane, propofol  · ipratropium  · local anesthetics like lidocaine, pramoxine, tetracaine  · MAOIs like Carbex, Eldepryl, Marplan, Nardil, and Parnate  · medicines that relax muscles for surgery  · methylene blue  · nilotinib  · other medicines with acetaminophen  · other narcotic medicines for pain or cough  · phenothiazines like chlorpromazine, mesoridazine, prochlorperazine, thioridazine  What if I miss a dose?  If you miss a dose, take it as soon as you can. If it is almost time for your next dose, take only that dose. Do not take double or extra doses.  Where should I keep my medicine?  Keep  out of the reach of children. This medicine can be abused. Keep your medicine in a safe place to protect it from theft. Do not share this medicine with anyone. Selling or giving away this medicine is dangerous and against the law.  This medicine may cause accidental overdose and death if it taken by other adults, children, or pets. Mix any unused medicine with a substance like cat litter or coffee grounds. Then throw the medicine away in a sealed container like a sealed bag or a coffee can with a lid. Do not use the medicine after the expiration date.  Store at room temperature between 20 and 25 degrees C (68 and 77 degrees F).  What should I tell my health care provider before I take this medicine?  They need to know if you have any of these conditions:  · brain tumor  · Crohn's disease, inflammatory bowel disease, or ulcerative colitis  · drug abuse or addiction  · head injury  · heart or circulation problems  · if you often drink alcohol  · kidney disease or problems going to the bathroom  · liver disease  · lung disease, asthma, or breathing problems  · an unusual or allergic reaction to acetaminophen, oxycodone, other opioid analgesics, other medicines, foods, dyes, or preservatives  · pregnant or trying to get pregnant  · breast-feeding  What should I watch for while using this medicine?  Tell your doctor or health care professional if your pain does not go away, if it gets worse, or if you have new or a different type of pain. You may develop tolerance to the medicine. Tolerance means that you will need a higher dose of the medication for pain relief. Tolerance is normal and is expected if you take this medicine for a long time.  Do not suddenly stop taking your medicine because you may develop a severe reaction. Your body becomes used to the medicine. This does NOT mean you are addicted. Addiction is a behavior related to getting and using a drug for a non-medical reason. If you have pain, you have a medical  reason to take pain medicine. Your doctor will tell you how much medicine to take. If your doctor wants you to stop the medicine, the dose will be slowly lowered over time to avoid any side effects.  There are different types of narcotic medicines (opiates). If you take more than one type at the same time or if you are taking another medicine that also causes drowsiness, you may have more side effects. Give your health care provider a list of all medicines you use. Your doctor will tell you how much medicine to take. Do not take more medicine than directed. Call emergency for help if you have problems breathing or unusual sleepiness.  Do not take other medicines that contain acetaminophen with this medicine. Always read labels carefully. If you have questions, ask your doctor or pharmacist.  If you take too much acetaminophen get medical help right away. Too much acetaminophen can be very dangerous and cause liver damage. Even if you do not have symptoms, it is important to get help right away.  You may get drowsy or dizzy. Do not drive, use machinery, or do anything that needs mental alertness until you know how this medicine affects you. Do not stand or sit up quickly, especially if you are an older patient. This reduces the risk of dizzy or fainting spells. Alcohol may interfere with the effect of this medicine. Avoid alcoholic drinks.  The medicine will cause constipation. Try to have a bowel movement at least every 2 to 3 days. If you do not have a bowel movement for 3 days, call your doctor or health care professional.  Your mouth may get dry. Chewing sugarless gum or sucking hard candy, and drinking plenty or water may help. Contact your doctor if the problem does not go away or is severe.  Date Last Reviewed:   NOTE:This sheet is a summary. It may not cover all possible information. If you have questions about this medicine, talk to your doctor, pharmacist, or health care provider. Copyright© 2016 [employer]        Tamsulosin capsules  What is this medicine?  TAMSULOSIN (diane GOPAL mary beth sin) is used to treat enlargement of the prostate gland in men, a condition called benign prostatic hyperplasia or BPH. It is not for use in women. It works by relaxing muscles in the prostate and bladder neck. This improves urine flow and reduces BPH symptoms.  How should I use this medicine?  Take this medicine by mouth about 30 minutes after the same meal every day. Follow the directions on the prescription label. Swallow the capsules whole with a glass of water. Do not crush, chew, or open capsules. Do not take your medicine more often than directed. Do not stop taking your medicine unless your doctor tells you to.  Talk to your pediatrician regarding the use of this medicine in children. Special care may be needed.  What side effects may I notice from receiving this medicine?  Side effects that you should report to your doctor or health care professional as soon as possible:  · allergic reactions like skin rash or itching, hives, swelling of the lips, mouth, tongue, or throat  · breathing problems  · change in vision  · feeling faint or lightheaded  · irregular heartbeat  · prolonged or painful erection  · weakness  Side effects that usually do not require medical attention (report to your doctor or health care professional if they continue or are bothersome):  · back pain  · change in sex drive or performance  · constipation, nausea or vomiting  · cough  · drowsy  · runny or stuffy nose  · trouble sleeping  What may interact with this medicine?  · cimetidine  · fluoxetine  · ketoconazole  · medicines for erectile disfunction like sildenafil, tadalafil, vardenafil  · medicines for high blood pressure  · other alpha-blockers like alfuzosin, doxazosin, phentolamine, phenoxybenzamine, prazosin, terazosin  · warfarin  What if I miss a dose?  If you miss a dose, take it as soon as you can. If it is almost time for your next dose,  take only that dose. Do not take double or extra doses. If you stop taking your medicine for several days or more, ask your doctor or health care professional what dose you should start back on.  Where should I keep my medicine?  Keep out of the reach of children.  Store at room temperature between 15 and 30 degrees C (59 and 86 degrees F). Throw away any unused medicine after the expiration date.  What should I tell my health care provider before I take this medicine?  They need to know if you have any of the following conditions:  · advanced kidney disease  · advanced liver disease  · low blood pressure  · prostate cancer  · an unusual or allergic reaction to tamsulosin, sulfa drugs, other medicines, foods, dyes, or preservatives  · pregnant or trying to get pregnant  · breast-feeding  What should I watch for while using this medicine?  Visit your doctor or health care professional for regular check ups. You will need lab work done before you start this medicine and regularly while you are taking it. Check your blood pressure as directed. Ask your health care professional what your blood pressure should be, and when you should contact him or her.  This medicine may make you feel dizzy or lightheaded. This is more likely to happen after the first dose, after an increase in dose, or during hot weather or exercise. Drinking alcohol and taking some medicines can make this worse. Do not drive, use machinery, or do anything that needs mental alertness until you know how this medicine affects you. Do not sit or stand up quickly. If you begin to feel dizzy, sit down until you feel better. These effects can decrease once your body adjusts to the medicine.  Contact your doctor or health care professional right away if you have an erection that lasts longer than 4 hours or if it becomes painful. This may be a sign of a serious problem and must be treated right away to prevent permanent damage.  If you are thinking of having  cataract surgery, tell your eye surgeon that you have taken this medicine.  Date Last Reviewed:   NOTE:This sheet is a summary. It may not cover all possible information. If you have questions about this medicine, talk to your doctor, pharmacist, or health care provider. Copyright© 2016 [employer]

## 2017-04-04 NOTE — OP NOTE
Date of Procedure: 04/04/2017    PREOPERATIVE DIAGNOSIS:  Right renal stone.    POSTOPERATIVE DIAGNOSIS:  Same.    PROCEDURES:  Right extracorporeal shock wave lithotripsy.    SURGEON:  Mundo Darby IV, M.D.    Assistants: None    Specimen: None    Prosthetics, Devices, Grafts: None    ANESTHESIA:  IV sedation.    FINDINGS:  The patient has a right renal stone easily seen with the lithotripter system and treated with shockwave therapy.  The stone was observed to become fuzzy during the treatment. Plan is for ESWL of remaining fragments in the kidney and ureteroscopy with laser lithotripsy of ureteral fragments.    PROCEDURE IN DETAIL:  After proper consents were obtained, the patient brought to the Operating Room where she was prepped and draped in normal fashion and provided with IV sedation on the [employer] lithotripsy table.  After alignment of the system on the aforementioned stone treatment was performed consisting of 3000 shocks varying in intensity from 16 to 26 kilovolts at a rate of 1.5 per second.  The stone was observed to fuzz out during the case and is expected to pass; however, there may be a need for further management if it does not.  The patient tolerated this well and there were no complications; she was then awakened and transferred to Recovery in stable condition.    BLOOD LOSS:  None.    BLOOD GIVEN:  None.    URINE OUTPUT:  None.    DRAINS:  None.    DISCHARGE DISPOSITION:  Home.    FOLLOWUP PLAN:  Pain medicines, stool softeners and instructions for care at home.  The patient will strain her urine and bring any fragments for evaluation, and she will return to clinic in a few weeks with KUB. Plan for ESWL of retained renal fragments and ureteroscopy with laser lithotripsy of ureteral fragments.

## 2017-04-04 NOTE — ANESTHESIA POSTPROCEDURE EVALUATION
"Anesthesia Post Evaluation    Patient: Sarah Monahan    Procedure(s) Performed: Procedure(s) (LRB):  LITHOTRIPSY-EXTRACORPOREAL SHOCK WAVE (Right)    Final Anesthesia Type: MAC  Patient location during evaluation: PACU  Patient participation: Yes- Able to Participate  Level of consciousness: awake and alert  Post-procedure vital signs: reviewed and stable  Pain management: adequate  Airway patency: patent  PONV status at discharge: No PONV  Anesthetic complications: no      Cardiovascular status: blood pressure returned to baseline  Respiratory status: unassisted  Hydration status: euvolemic  Follow-up not needed.        Visit Vitals    BP (!) 174/84    Pulse 67    Temp 36.6 °C (97.9 °F) (Temporal)    Resp (!) 9    Ht 5' 4" (1.626 m)    Wt 71.9 kg (158 lb 8.2 oz)    SpO2 100%    Breastfeeding No    BMI 27.21 kg/m2       Pain/Ventura Score: Pain Assessment Performed: Yes (4/4/2017  7:49 AM)  Presence of Pain: complains of pain/discomfort (4/4/2017 10:45 AM)  Pain Rating Prior to Med Admin: 6 (4/4/2017 10:50 AM)  Ventura Score: 8 (4/4/2017 10:45 AM)      "

## 2017-04-04 NOTE — TRANSFER OF CARE
"Anesthesia Transfer of Care Note    Patient: Sarah Monahan    Procedure(s) Performed: Procedure(s) (LRB):  LITHOTRIPSY-EXTRACORPOREAL SHOCK WAVE (Right)    Patient location: PACU    Anesthesia Type: general    Transport from OR: Transported from OR on room air with adequate spontaneous ventilation    Post pain: adequate analgesia    Post assessment: no apparent anesthetic complications and tolerated procedure well    Post vital signs: stable    Level of consciousness: sedated    Nausea/Vomiting: no nausea/vomiting    Complications: none          Last vitals:   Visit Vitals    BP (!) 174/84    Pulse 69    Temp 36.6 °C (97.9 °F) (Temporal)    Resp 15    Ht 5' 4" (1.626 m)    Wt 71.9 kg (158 lb 8.2 oz)    SpO2 100%    Breastfeeding No    BMI 27.21 kg/m2     "

## 2017-04-04 NOTE — INTERVAL H&P NOTE
The patient has been examined and the H&P has been reviewed:    I concur with the findings and no changes have occurred since H&P was written.    Anesthesia/Surgery risks, benefits and alternative options discussed and understood by patient/family.          Active Hospital Problems    Diagnosis  POA    Renal stone [N20.0]  Yes      Resolved Hospital Problems    Diagnosis Date Resolved POA   No resolved problems to display.

## 2017-04-04 NOTE — H&P (VIEW-ONLY)
Chief Complaint: Urolithiasis followup    HPI:   3/10/17: No UTI last visit.  Ct/KUB show a 12mm right renal stone, upper pole amenable to ESWL; has other 1-2mm stones.  2/6/17: Back after a long time.  Has been seeing Dr. Hutchinson for UTI and she was just given Abx.  Still seeing gross hematuria.  Has a burning right flank pain for the last few months.  Says she had a normal IVP at [facility] last November.  11/12/15: Added bactrim prophylaxis to regimen. Still symptomatic and thinks she has a UTI.  Is on the daily bactrim.  Strong urgency, malodorous urine.  Seeing gross hematuria from time to time.  10/12/15: Still has some right flank pain not related to position.  Will check UCx again - UA negative.  Feels much better than a month ago.  Was in a detox center in [place] and dx with another UTI last week and is finishing more ABx given there.  9/14/15: Returns again with +Ucx treated with cipro; right flank pain persists.  CT shows some stones on the right that are overall stable, but perhaps there is some degree of infection related to them.  8/27/15: Returns for followup after being admitted for a presumed kidney infection earlier this month at [facility].  Has had monghts of microhematuria, recurrent UTI, bilateral flank pain.  Nit+ urine today.  No imaging was done there.  Symptoms have been going on since last summer with UTI then, and the flank pain picked up about a week before the hospitilization.  Malodorous urine.  11/8/12: S/P Right URS; Doing well, no pain, happy to have her stones out and no stent. Was told by another office on Monday she had a UTI so she came for a nurse visit; our UA and UCx  Negative. 24 hour urine study showed low UOP and she had elevated PTH.    Allergies:  Macrodantin [nitrofurantoin macrocrystalline]; Nitrofurantoin; and Nitrofurantoin monohyd/m-cryst    Medications: has a current medication list which includes the following prescription(s): advair diskus, albuterol, albuterol,  baclofen, clonazepam, ergocalciferol, estradiol, inhalation device, omeprazole, ondansetron, quetiapine, ranitidine, roflumilast, trazodone, and umeclidinium.    Review of Systems:  General: No fever, chills, fatigability, or weight loss.  Skin: No rashes, itching, or changes in color or texture of skin.  Chest: Denies GUAN, cyanosis, wheezing, cough, and sputum production.  Abdomen: Appetite fine. No weight loss. Denies diarrhea, abdominal pain, hematemesis, or blood in stool.  Musculoskeletal: No joint stiffness or swelling. Denies back pain.  : As above.  All other review of systems negative.    PMH:   has a past medical history of Anemia; Asthma; Bronchitis chronic; Diabetes mellitus; Emphysema of lung; Herniated disc; Hyperlipidemia; Kidney stone; Lung disease; Supraventricular tachycardia; and Urinary tract infection.    PSH:   has a past surgical history that includes Cervical fusion; Cholecystectomy; Knee arthroscopy w/ ACL reconstruction; Hernia repair;  section, classic; Salpingectomy; and Cystoscopy w/ laser lithotripsy.    FamHx: family history includes Cancer in her maternal grandmother and mother; Diabetes in her father and paternal grandmother; Heart attack (age of onset: 60) in her father; Heart disease in her father; Heart failure in her paternal grandmother; Hypertension in her father.    SocHx:  reports that she quit smoking about 21 months ago. She has a 40.00 pack-year smoking history. She has never used smokeless tobacco. She reports that she does not drink alcohol or use illicit drugs.     Physical Exam:  Vitals:   Vitals:    03/10/17 1136   BP: 132/88     General: A&Ox3. No apparent distress. No deformities.  Neck: No masses. Normal thyroid.  Lungs: normal inspiration. No use of accessory muscles.  Heart: normal pulse. No arrhythmias.  Abdomen: Soft. NT. ND.  Skin: The skin is warm and dry. No jaundice.  Ext: No c/c/e.  : deferred.    Labs/Studies:   CT    9/10/15: Small bilateral  stones largest 4 mm    3/10/17: Multiple bilateral stones 1-2mm, 12mm right upper pole stone    Impression/Plan:   1. No UTI, UA findings likely from stone.  Needs to get cleared for surgery and will arrange that prior to making surgery arrangements.  Risks/benefits reviewed, consents on chart.

## 2017-04-06 ENCOUNTER — PATIENT MESSAGE (OUTPATIENT)
Dept: UROLOGY | Facility: CLINIC | Age: 57
End: 2017-04-06

## 2017-04-06 ENCOUNTER — TELEPHONE (OUTPATIENT)
Dept: UROLOGY | Facility: CLINIC | Age: 57
End: 2017-04-06

## 2017-04-06 NOTE — TELEPHONE ENCOUNTER
----- Message from Olvin Howard sent at 4/6/2017  8:54 AM CDT -----  Contact: Pt  Pt states she is returning nurse call, please contact pt at [phone]

## 2017-04-06 NOTE — TELEPHONE ENCOUNTER
Patient states she went to ER in [place] around 2AM this morning for severe flank pain. Patient states CT scan from this morning shows an 8mm obstructing stone. Patient is crying and experiencing 10/10 flank pain that is uncontrolled with pain medication. Patient is afebrile and complains of nausea/vomiting. Informed patient that Dr. Darby is out of town and will not return until Monday. Advised patient to go to [facility] ER on [facility]. Dr. García ([facility]) is the urologist on call. Spoke with his office and notified them that patient will be arriving to ER soon.

## 2017-04-07 ENCOUNTER — PATIENT MESSAGE (OUTPATIENT)
Dept: UROLOGY | Facility: CLINIC | Age: 57
End: 2017-04-07

## 2017-04-10 RX ORDER — OXYCODONE AND ACETAMINOPHEN 5; 325 MG/1; MG/1
1-2 TABLET ORAL EVERY 4 HOURS PRN
Qty: 30 TABLET | Refills: 0 | Status: ON HOLD | OUTPATIENT
Start: 2017-04-10 | End: 2017-04-18 | Stop reason: HOSPADM

## 2017-04-10 NOTE — TELEPHONE ENCOUNTER
Spoke with Dr. Carcamo who recommends patient have ureteroscopy on 4/18/17. Notified patient. Patient also spoke with Dr. Darby via telephone. Patient to sign consents morning of surgery per Dr. Darby. MD also refill Percocet per patient request. Patient states understanding.

## 2017-04-10 NOTE — TELEPHONE ENCOUNTER
Dr. García with [facility] placed a ureteral stent in this patient on 4/7/17 at [facility] due to an 8mm obstructing stone seen on a CT scan on 4/6/17 at [facility] and severe flank pain. Dr. García has recommended that patient return for lithotripsy next Wednesday. Patient requesting that any future procedures be performed by you because you are her urologist. She had a lithotripsy with you on 4/4/17 and her post op appointment is on 5/3/17. Please advise.

## 2017-04-17 DIAGNOSIS — N20.1 RIGHT URETERAL STONE: ICD-10-CM

## 2017-04-17 DIAGNOSIS — N20.1 URETERAL STONE: Primary | ICD-10-CM

## 2017-04-17 NOTE — PRE-PROCEDURE INSTRUCTIONS
Pre op instructions reviewed with patient per phone:    To confirm, Your surgeon has instructed you:  Surgery is scheduled 4/18/17 at [facility].      Please report to [facility] 1st floor main lobby by MAI Darby's office to inform of arrival time.      INSTRUCTIONS IMPORTANT!!!  ¨ Do not eat, drink, or smoke after 12 midnight-including water. OK to brush teeth, no gum, candy or mints!    ¨ Take only these medicines with a small swallow of water-morning of surgery.  Albuterol Neb, Albuterol Inh, Ellipta    ____  Do not wear makeup, including mascara.  ____  No powder, lotions or creams to surgical area.  ____  Please remove all jewelry, including piercings and leave at home.  ____  No money or valuables needed. Please leave at home.  ____  Please bring identification and insurance information to hospital.  ____  If going home the same day, arrange for a ride home. You will not be able to   drive if Anesthesia was used.  ____  Children, under 12 years old, must remain in the waiting room with an adult.  They are not allowed in patient areas.  ____  Wear loose fitting clothing. Allow for dressings, bandages.  ____  Stop Aspirin, Ibuprofen, Motrin and Aleve at least 5-7 days before surgery, unless otherwise instructed by your doctor, or the nurse.   You MAY use Tylenol/acetaminophen until day of surgery.  ____  If you take diabetic medication, do not take am of surgery unless instructed by   Doctor.  ____ Stop taking any Fish Oil supplement or any Vitamins that contain Vitamin E at least 5 days prior to surgery.          Bathing Instructions-- The night before surgery and the morning prior to coming to the hospital:   -Do not shave the surgical area.   -Shower and wash your hair and body as usual with anti-bacterial  soap and shampoo.   -Rinse your hair and body completely.   -Use one packet of hibiclens to wash the surgical site (using your hand) gently for 5 minutes.  Do not scrub you skin  too hard.   -Do not use hibiclens on your head, face, or genitals.   -Do not wash with anti-bacterial soap after you use the hibiclens.   -Rinse your body thoroughly.   -Dry with clean, soft towel.  Do not use lotion, cream, deodorant, or powders on   the surgical site.    Use antibacterial soap in place of hibiclens if your surgery is on the head, face or genitals.         Surgical Site Infection    Prevention of surgical site infections:     -Keep incisions clean and dry.   -Do not soak/submerge incisions in water until completely healed.   -Do not apply lotions, powders, creams, or deodorants to site.   -Always make sure hands are cleaned with antibacterial soap/ alcohol-based   prior to touching the surgical site.  (This includes doctors, nurses, staff, and yourself.)    Signs and symptoms:   -Redness and pain around the area where you had surgery   -Drainage of cloudy fluid from your surgical wound   -Fever over 100.4  I have read or had read and explained to me, and understand the above information.

## 2017-04-17 NOTE — PRE-PROCEDURE INSTRUCTIONS
Spoke to Dr. Taylor regarding pt dx of COPD. She had an ESWL on 4/4 and was cleared for that surgery from Pulmonary. No further orders for this sx.

## 2017-04-18 ENCOUNTER — ANESTHESIA EVENT (OUTPATIENT)
Dept: SURGERY | Facility: HOSPITAL | Age: 57
End: 2017-04-18
Payer: MEDICAID

## 2017-04-18 ENCOUNTER — TELEPHONE (OUTPATIENT)
Dept: UROLOGY | Facility: CLINIC | Age: 57
End: 2017-04-18

## 2017-04-18 ENCOUNTER — HOSPITAL ENCOUNTER (OUTPATIENT)
Facility: HOSPITAL | Age: 57
Discharge: HOME | End: 2017-04-18
Attending: UROLOGY | Admitting: UROLOGY
Payer: MEDICAID

## 2017-04-18 ENCOUNTER — ANESTHESIA (OUTPATIENT)
Dept: SURGERY | Facility: HOSPITAL | Age: 57
End: 2017-04-18
Payer: MEDICAID

## 2017-04-18 ENCOUNTER — SURGERY (OUTPATIENT)
Age: 57
End: 2017-04-18

## 2017-04-18 VITALS
SYSTOLIC BLOOD PRESSURE: 126 MMHG | OXYGEN SATURATION: 95 % | HEIGHT: 64 IN | WEIGHT: 155 LBS | RESPIRATION RATE: 12 BRPM | BODY MASS INDEX: 26.46 KG/M2 | DIASTOLIC BLOOD PRESSURE: 65 MMHG | TEMPERATURE: 98 F | HEART RATE: 75 BPM

## 2017-04-18 DIAGNOSIS — N20.0 RENAL STONE: Primary | ICD-10-CM

## 2017-04-18 DIAGNOSIS — N20.1 RIGHT URETERAL STONE: ICD-10-CM

## 2017-04-18 DIAGNOSIS — N20.1 URETERAL STONE: ICD-10-CM

## 2017-04-18 LAB — POCT GLUCOSE: 133 MG/DL (ref 70–110)

## 2017-04-18 PROCEDURE — 25500020 PHARM REV CODE 255: Performed by: UROLOGY

## 2017-04-18 PROCEDURE — 52356 CYSTO/URETERO W/LITHOTRIPSY: CPT | Mod: 58,RT,, | Performed by: UROLOGY

## 2017-04-18 PROCEDURE — 27201423 OPTIME MED/SURG SUP & DEVICES STERILE SUPPLY: Performed by: UROLOGY

## 2017-04-18 PROCEDURE — 88300 SURGICAL PATH GROSS: CPT | Mod: 26,,, | Performed by: PATHOLOGY

## 2017-04-18 PROCEDURE — 71000015 HC POSTOP RECOV 1ST HR: Performed by: UROLOGY

## 2017-04-18 PROCEDURE — 63600175 PHARM REV CODE 636 W HCPCS: Performed by: UROLOGY

## 2017-04-18 PROCEDURE — C2617 STENT, NON-COR, TEM W/O DEL: HCPCS | Performed by: UROLOGY

## 2017-04-18 PROCEDURE — 82370 X-RAY ASSAY CALCULUS: CPT

## 2017-04-18 PROCEDURE — 74420 UROGRAPHY RTRGR +-KUB: CPT | Mod: 26,,, | Performed by: UROLOGY

## 2017-04-18 PROCEDURE — 25000003 PHARM REV CODE 250: Performed by: ANESTHESIOLOGY

## 2017-04-18 PROCEDURE — 37000008 HC ANESTHESIA 1ST 15 MINUTES: Performed by: UROLOGY

## 2017-04-18 PROCEDURE — C1773 RET DEV, INSERTABLE: HCPCS | Performed by: UROLOGY

## 2017-04-18 PROCEDURE — 88300 SURGICAL PATH GROSS: CPT | Performed by: PATHOLOGY

## 2017-04-18 PROCEDURE — 25000003 PHARM REV CODE 250: Performed by: NURSE ANESTHETIST, CERTIFIED REGISTERED

## 2017-04-18 PROCEDURE — 37000009 HC ANESTHESIA EA ADD 15 MINS: Performed by: UROLOGY

## 2017-04-18 PROCEDURE — 76000 FLUOROSCOPY <1 HR PHYS/QHP: CPT | Mod: 26,59,, | Performed by: UROLOGY

## 2017-04-18 PROCEDURE — C1769 GUIDE WIRE: HCPCS | Performed by: UROLOGY

## 2017-04-18 PROCEDURE — C1894 INTRO/SHEATH, NON-LASER: HCPCS | Performed by: UROLOGY

## 2017-04-18 PROCEDURE — 36000706: Performed by: UROLOGY

## 2017-04-18 PROCEDURE — 36000707: Performed by: UROLOGY

## 2017-04-18 PROCEDURE — 71000039 HC RECOVERY, EACH ADD'L HOUR: Performed by: UROLOGY

## 2017-04-18 PROCEDURE — 63600175 PHARM REV CODE 636 W HCPCS: Performed by: NURSE ANESTHETIST, CERTIFIED REGISTERED

## 2017-04-18 PROCEDURE — 25000003 PHARM REV CODE 250: Performed by: UROLOGY

## 2017-04-18 PROCEDURE — 71000033 HC RECOVERY, INTIAL HOUR: Performed by: UROLOGY

## 2017-04-18 PROCEDURE — 63600175 PHARM REV CODE 636 W HCPCS: Performed by: ANESTHESIOLOGY

## 2017-04-18 DEVICE — STENT URETERAL UNIV 6FR 24CM: Type: IMPLANTABLE DEVICE | Site: URETER | Status: FUNCTIONAL

## 2017-04-18 RX ORDER — MEPERIDINE HYDROCHLORIDE 50 MG/ML
12.5 INJECTION INTRAMUSCULAR; INTRAVENOUS; SUBCUTANEOUS ONCE AS NEEDED
Status: DISCONTINUED | OUTPATIENT
Start: 2017-04-18 | End: 2017-04-18 | Stop reason: HOSPADM

## 2017-04-18 RX ORDER — SODIUM CHLORIDE 0.9 % (FLUSH) 0.9 %
3 SYRINGE (ML) INJECTION
Status: DISCONTINUED | OUTPATIENT
Start: 2017-04-18 | End: 2017-04-18 | Stop reason: SDUPTHER

## 2017-04-18 RX ORDER — SODIUM CHLORIDE 9 MG/ML
3 INJECTION, SOLUTION INTRAMUSCULAR; INTRAVENOUS; SUBCUTANEOUS EVERY 8 HOURS
Status: DISCONTINUED | OUTPATIENT
Start: 2017-04-18 | End: 2017-04-18 | Stop reason: HOSPADM

## 2017-04-18 RX ORDER — OXYCODONE AND ACETAMINOPHEN 5; 325 MG/1; MG/1
1-2 TABLET ORAL EVERY 4 HOURS PRN
Qty: 30 TABLET | Refills: 0 | Status: SHIPPED | OUTPATIENT
Start: 2017-04-18 | End: 2017-04-21 | Stop reason: SDUPTHER

## 2017-04-18 RX ORDER — SUCCINYLCHOLINE CHLORIDE 20 MG/ML
INJECTION INTRAMUSCULAR; INTRAVENOUS
Status: DISCONTINUED | OUTPATIENT
Start: 2017-04-18 | End: 2017-04-18

## 2017-04-18 RX ORDER — ONDANSETRON 2 MG/ML
INJECTION INTRAMUSCULAR; INTRAVENOUS
Status: DISCONTINUED | OUTPATIENT
Start: 2017-04-18 | End: 2017-04-18

## 2017-04-18 RX ORDER — SODIUM CHLORIDE 9 MG/ML
3 INJECTION, SOLUTION INTRAMUSCULAR; INTRAVENOUS; SUBCUTANEOUS
Status: DISCONTINUED | OUTPATIENT
Start: 2017-04-18 | End: 2017-04-18 | Stop reason: HOSPADM

## 2017-04-18 RX ORDER — MORPHINE SULFATE 10 MG/ML
2 INJECTION INTRAMUSCULAR; INTRAVENOUS; SUBCUTANEOUS EVERY 5 MIN PRN
Status: DISCONTINUED | OUTPATIENT
Start: 2017-04-18 | End: 2017-04-18 | Stop reason: HOSPADM

## 2017-04-18 RX ORDER — HYDROMORPHONE HYDROCHLORIDE 2 MG/ML
0.2 INJECTION, SOLUTION INTRAMUSCULAR; INTRAVENOUS; SUBCUTANEOUS EVERY 5 MIN PRN
Status: DISCONTINUED | OUTPATIENT
Start: 2017-04-18 | End: 2017-04-18 | Stop reason: HOSPADM

## 2017-04-18 RX ORDER — NEOSTIGMINE METHYLSULFATE 1 MG/ML
INJECTION, SOLUTION INTRAVENOUS
Status: DISCONTINUED | OUTPATIENT
Start: 2017-04-18 | End: 2017-04-18

## 2017-04-18 RX ORDER — HYDROCODONE BITARTRATE AND ACETAMINOPHEN 10; 325 MG/1; MG/1
1 TABLET ORAL EVERY 4 HOURS PRN
Status: DISCONTINUED | OUTPATIENT
Start: 2017-04-18 | End: 2017-04-18 | Stop reason: HOSPADM

## 2017-04-18 RX ORDER — DOCUSATE SODIUM 100 MG/1
100 CAPSULE, LIQUID FILLED ORAL 2 TIMES DAILY
Qty: 60 CAPSULE | Refills: 0 | Status: SHIPPED | OUTPATIENT
Start: 2017-04-18 | End: 2017-06-30 | Stop reason: SDUPTHER

## 2017-04-18 RX ORDER — PROPOFOL 10 MG/ML
VIAL (ML) INTRAVENOUS
Status: DISCONTINUED | OUTPATIENT
Start: 2017-04-18 | End: 2017-04-18

## 2017-04-18 RX ORDER — SODIUM CHLORIDE, SODIUM LACTATE, POTASSIUM CHLORIDE, CALCIUM CHLORIDE 600; 310; 30; 20 MG/100ML; MG/100ML; MG/100ML; MG/100ML
INJECTION, SOLUTION INTRAVENOUS CONTINUOUS
Status: DISCONTINUED | OUTPATIENT
Start: 2017-04-18 | End: 2017-04-18 | Stop reason: HOSPADM

## 2017-04-18 RX ORDER — HYDROCODONE BITARTRATE AND ACETAMINOPHEN 5; 325 MG/1; MG/1
1 TABLET ORAL EVERY 4 HOURS PRN
Status: DISCONTINUED | OUTPATIENT
Start: 2017-04-18 | End: 2017-04-18 | Stop reason: HOSPADM

## 2017-04-18 RX ORDER — ROCURONIUM BROMIDE 10 MG/ML
INJECTION, SOLUTION INTRAVENOUS
Status: DISCONTINUED | OUTPATIENT
Start: 2017-04-18 | End: 2017-04-18

## 2017-04-18 RX ORDER — FENTANYL CITRATE 50 UG/ML
INJECTION, SOLUTION INTRAMUSCULAR; INTRAVENOUS
Status: DISCONTINUED | OUTPATIENT
Start: 2017-04-18 | End: 2017-04-18

## 2017-04-18 RX ORDER — TAMSULOSIN HYDROCHLORIDE 0.4 MG/1
0.4 CAPSULE ORAL DAILY
Qty: 30 CAPSULE | Refills: 1 | Status: SHIPPED | OUTPATIENT
Start: 2017-04-18 | End: 2017-06-30

## 2017-04-18 RX ORDER — MIDAZOLAM HYDROCHLORIDE 1 MG/ML
INJECTION, SOLUTION INTRAMUSCULAR; INTRAVENOUS
Status: DISCONTINUED | OUTPATIENT
Start: 2017-04-18 | End: 2017-04-18

## 2017-04-18 RX ORDER — FENTANYL CITRATE 50 UG/ML
25 INJECTION, SOLUTION INTRAMUSCULAR; INTRAVENOUS EVERY 5 MIN PRN
Status: COMPLETED | OUTPATIENT
Start: 2017-04-18 | End: 2017-04-18

## 2017-04-18 RX ORDER — LIDOCAINE HCL/PF 100 MG/5ML
SYRINGE (ML) INTRAVENOUS
Status: DISCONTINUED | OUTPATIENT
Start: 2017-04-18 | End: 2017-04-18

## 2017-04-18 RX ORDER — CEFAZOLIN SODIUM 2 G/50ML
2 SOLUTION INTRAVENOUS
Status: DISCONTINUED | OUTPATIENT
Start: 2017-04-18 | End: 2017-04-18 | Stop reason: HOSPADM

## 2017-04-18 RX ORDER — CIPROFLOXACIN 500 MG/1
500 TABLET ORAL EVERY 12 HOURS
Qty: 6 TABLET | Refills: 0 | Status: SHIPPED | OUTPATIENT
Start: 2017-04-18 | End: 2017-04-21

## 2017-04-18 RX ORDER — LIDOCAINE HYDROCHLORIDE 10 MG/ML
1 INJECTION, SOLUTION EPIDURAL; INFILTRATION; INTRACAUDAL; PERINEURAL ONCE
Status: DISCONTINUED | OUTPATIENT
Start: 2017-04-18 | End: 2017-04-18 | Stop reason: HOSPADM

## 2017-04-18 RX ORDER — MEPERIDINE HYDROCHLORIDE 50 MG/ML
12.5 INJECTION INTRAMUSCULAR; INTRAVENOUS; SUBCUTANEOUS ONCE AS NEEDED
Status: DISCONTINUED | OUTPATIENT
Start: 2017-04-18 | End: 2017-04-18

## 2017-04-18 RX ORDER — GLYCOPYRROLATE 0.2 MG/ML
INJECTION INTRAMUSCULAR; INTRAVENOUS
Status: DISCONTINUED | OUTPATIENT
Start: 2017-04-18 | End: 2017-04-18

## 2017-04-18 RX ADMIN — FENTANYL CITRATE 25 MCG: 50 INJECTION INTRAMUSCULAR; INTRAVENOUS at 02:04

## 2017-04-18 RX ADMIN — HYDROMORPHONE HYDROCHLORIDE 0.2 MG: 2 INJECTION, SOLUTION INTRAMUSCULAR; INTRAVENOUS; SUBCUTANEOUS at 02:04

## 2017-04-18 RX ADMIN — PROPOFOL 150 MG: 10 INJECTION, EMULSION INTRAVENOUS at 12:04

## 2017-04-18 RX ADMIN — LIDOCAINE HYDROCHLORIDE 80 MG: 20 INJECTION, SOLUTION INTRAVENOUS at 12:04

## 2017-04-18 RX ADMIN — HYDROMORPHONE HYDROCHLORIDE 0.2 MG: 2 INJECTION, SOLUTION INTRAMUSCULAR; INTRAVENOUS; SUBCUTANEOUS at 03:04

## 2017-04-18 RX ADMIN — SUCCINYLCHOLINE CHLORIDE 140 MG: 20 INJECTION, SOLUTION INTRAMUSCULAR; INTRAVENOUS at 12:04

## 2017-04-18 RX ADMIN — SODIUM CHLORIDE, SODIUM LACTATE, POTASSIUM CHLORIDE, AND CALCIUM CHLORIDE: .6; .31; .03; .02 INJECTION, SOLUTION INTRAVENOUS at 10:04

## 2017-04-18 RX ADMIN — CEFAZOLIN SODIUM 2 G: 2 SOLUTION INTRAVENOUS at 12:04

## 2017-04-18 RX ADMIN — IOHEXOL 30 ML: 300 INJECTION, SOLUTION INTRAVENOUS at 01:04

## 2017-04-18 RX ADMIN — FENTANYL CITRATE 25 MCG: 50 INJECTION INTRAMUSCULAR; INTRAVENOUS at 01:04

## 2017-04-18 RX ADMIN — MIDAZOLAM HYDROCHLORIDE 2 MG: 1 INJECTION, SOLUTION INTRAMUSCULAR; INTRAVENOUS at 12:04

## 2017-04-18 RX ADMIN — GLYCOPYRROLATE 0.8 MG: 0.2 INJECTION, SOLUTION INTRAMUSCULAR; INTRAVENOUS at 01:04

## 2017-04-18 RX ADMIN — ONDANSETRON 4 MG: 2 INJECTION, SOLUTION INTRAMUSCULAR; INTRAVENOUS at 01:04

## 2017-04-18 RX ADMIN — ROCURONIUM BROMIDE 25 MG: 10 INJECTION, SOLUTION INTRAVENOUS at 12:04

## 2017-04-18 RX ADMIN — NEOSTIGMINE METHYLSULFATE 5 MG: 1 INJECTION INTRAVENOUS at 01:04

## 2017-04-18 RX ADMIN — FENTANYL CITRATE 100 MCG: 50 INJECTION, SOLUTION INTRAMUSCULAR; INTRAVENOUS at 12:04

## 2017-04-18 RX ADMIN — HYDROCODONE BITARTRATE AND ACETAMINOPHEN 1 TABLET: 10; 325 TABLET ORAL at 02:04

## 2017-04-18 RX ADMIN — ROCURONIUM BROMIDE 5 MG: 10 INJECTION, SOLUTION INTRAVENOUS at 12:04

## 2017-04-18 NOTE — H&P
Chief Complaint: Urolithiasis followup     HPI:   4/18/17: Had ESWL with subsequent right ureteral obstruction; staged right URS today.  3/10/17: No UTI last visit. Ct/KUB show a 12mm right renal stone, upper pole amenable to ESWL; has other 1-2mm stones.  2/6/17: Back after a long time. Has been seeing Dr. Hutchinson for UTI and she was just given Abx. Still seeing gross hematuria. Has a burning right flank pain for the last few months. Says she had a normal IVP at [facility] last November.  11/12/15: Added bactrim prophylaxis to regimen. Still symptomatic and thinks she has a UTI. Is on the daily bactrim. Strong urgency, malodorous urine. Seeing gross hematuria from time to time.  10/12/15: Still has some right flank pain not related to position. Will check UCx again - UA negative. Feels much better than a month ago. Was in a detox center in [place] and dx with another UTI last week and is finishing more ABx given there.  9/14/15: Returns again with +Ucx treated with cipro; right flank pain persists. CT shows some stones on the right that are overall stable, but perhaps there is some degree of infection related to them.  8/27/15: Returns for followup after being admitted for a presumed kidney infection earlier this month at [facility]. Has had monghts of microhematuria, recurrent UTI, bilateral flank pain. Nit+ urine today. No imaging was done there. Symptoms have been going on since last summer with UTI then, and the flank pain picked up about a week before the hospitilization. Malodorous urine.  11/8/12: S/P Right URS; Doing well, no pain, happy to have her stones out and no stent. Was told by another office on Monday she had a UTI so she came for a nurse visit; our UA and UCx  Negative. 24 hour urine study showed low UOP and she had elevated PTH.     Allergies:  Macrodantin [nitrofurantoin macrocrystalline]; Nitrofurantoin; and Nitrofurantoin monohyd/m-cryst     Medications: has a current medication list which  includes the following prescription(s): advair diskus, albuterol, albuterol, baclofen, clonazepam, ergocalciferol, estradiol, inhalation device, omeprazole, ondansetron, quetiapine, ranitidine, roflumilast, trazodone, and umeclidinium.     Review of Systems:  General: No fever, chills, fatigability, or weight loss.  Skin: No rashes, itching, or changes in color or texture of skin.  Chest: Denies GUAN, cyanosis, wheezing, cough, and sputum production.  Abdomen: Appetite fine. No weight loss. Denies diarrhea, abdominal pain, hematemesis, or blood in stool.  Musculoskeletal: No joint stiffness or swelling. Denies back pain.  : As above.  All other review of systems negative.     PMH:   has a past medical history of Anemia; Asthma; Bronchitis chronic; Diabetes mellitus; Emphysema of lung; Herniated disc; Hyperlipidemia; Kidney stone; Lung disease; Supraventricular tachycardia; and Urinary tract infection.     PSH:   has a past surgical history that includes Cervical fusion; Cholecystectomy; Knee arthroscopy w/ ACL reconstruction; Hernia repair;  section, classic; Salpingectomy; and Cystoscopy w/ laser lithotripsy.     FamHx: family history includes Cancer in her maternal grandmother and mother; Diabetes in her father and paternal grandmother; Heart attack (age of onset: 60) in her father; Heart disease in her father; Heart failure in her paternal grandmother; Hypertension in her father.     SocHx:  reports that she quit smoking about 21 months ago. She has a 40.00 pack-year smoking history. She has never used smokeless tobacco. She reports that she does not drink alcohol or use illicit drugs.      Physical Exam:  Vitals:       Vitals:     03/10/17 1136   BP: 132/88      General: A&Ox3. No apparent distress. No deformities.  Neck: No masses. Normal thyroid.  Lungs: normal inspiration. No use of accessory muscles.  Heart: normal pulse. No arrhythmias.  Abdomen: Soft. NT. ND.  Skin: The skin is warm and dry. No  jaundice.  Ext: No c/c/e.  : deferred.     Labs/Studies:   CT  9/10/15: Small bilateral stones largest 4 mm  3/10/17: Multiple bilateral stones 1-2mm, 12mm right upper pole stone     Impression/Plan:   1. Risks/benefits of right URS reviewed, consents on chart.

## 2017-04-18 NOTE — ANESTHESIA POSTPROCEDURE EVALUATION
"Anesthesia Post Evaluation    Patient: Sarah Monahan    Procedure(s) Performed: Procedure(s) (LRB):  LITHOTRIPSY-LASER (Right)  CYSTOSCOPY WITH STENT REMOVAL (Right)  PYELOGRAM-RETROGRADE (Right)  CYSTOSCOPY WITH STENT PLACEMENT (Right)  REMOVAL-STONE-URETERAL    Final Anesthesia Type: general  Patient location during evaluation: PACU  Patient participation: Yes- Able to Participate  Level of consciousness: awake and alert  Post-procedure vital signs: reviewed and stable  Pain management: adequate  Airway patency: patent  PONV status at discharge: No PONV  Anesthetic complications: no      Cardiovascular status: blood pressure returned to baseline  Respiratory status: unassisted and spontaneous ventilation  Hydration status: euvolemic  Follow-up not needed.        Visit Vitals    /65    Pulse 75    Temp 36.6 °C (97.9 °F) (Temporal)    Resp 12    Ht 5' 4" (1.626 m)    Wt 70.3 kg (155 lb)    SpO2 95%    Breastfeeding No    BMI 26.61 kg/m2       Pain/Ventura Score: Pain Assessment Performed: Yes (4/18/2017  3:13 PM)  Presence of Pain: complains of pain/discomfort (4/18/2017  3:13 PM)  Pain Rating Prior to Med Admin: 5 (4/18/2017  3:11 PM)  Ventura Score: 10 (4/18/2017  3:13 PM)      "

## 2017-04-18 NOTE — ANESTHESIA PREPROCEDURE EVALUATION
04/18/2017  Sarah Monahan is a 57 y.o., female.    Anesthesia Evaluation    I have reviewed the Patient Summary Reports.    I have reviewed the Nursing Notes.   I have reviewed the Medications.     Review of Systems  Anesthesia Hx:  No problems with previous Anesthesia  Denies Family Hx of Anesthesia complications.   Denies Personal Hx of Anesthesia complications.   Social:  Smoker    Cardiovascular:  Cardiovascular Normal     Pulmonary:   COPD, moderate Asthma moderate    Renal/:   renal calculi    Hepatic/GI:   GERD Liver Disease, Hepatitis, C    Musculoskeletal:   Arthritis   Spine Disorders: thoracic, lumbar and cervical Chronic Pain    Neurological:   Chronic Pain Syndrome   Psych:   depression          Physical Exam  General:  Well nourished    Airway/Jaw/Neck:  Airway Findings: Mouth Opening: Normal Tongue: Normal  General Airway Assessment: Adult  Mallampati: II  TM Distance: Normal, at least 6 cm  Jaw/Neck Findings:  Neck ROM: Normal ROM      Dental:  Dental Findings: EdentulousEdentulous top   Chest/Lungs:  Chest/Lungs Findings: Normal Respiratory Rate, Expiratory Wheezes, Mild     Heart/Vascular:  Heart Findings: Rate: Normal  Rhythm: Regular Rhythm             Anesthesia Plan  Type of Anesthesia, risks & benefits discussed:  Anesthesia Type:  general  Patient's Preference:   Intra-op Monitoring Plan:   Intra-op Monitoring Plan Comments:   Post Op Pain Control Plan:   Post Op Pain Control Plan Comments:   Induction:   IV  Beta Blocker:  Patient is not currently on a Beta-Blocker (No further documentation required).       Informed Consent: Patient understands risks and agrees with Anesthesia plan.  Questions answered. Anesthesia consent signed with patient.  ASA Score: 3     Day of Surgery Review of History & Physical:    H&P update referred to the surgeon.         Ready For Surgery From  Anesthesia Perspective.

## 2017-04-18 NOTE — TELEPHONE ENCOUNTER
----- Message from Pro Darby IV, MD sent at 4/18/2017  1:39 PM CDT -----  RTC 2 wks with KUB/US then pls;  Stent pull Monday  By pt

## 2017-04-18 NOTE — DISCHARGE SUMMARY
Date of Discharge: 04/18/2017     Principle Diagnosis: Renal and ureteral stones    Secondary Diagnosis:  has a past medical history of Acid reflux; Anemia; Asthma; Bronchitis chronic; Diabetes mellitus; DVT, lower extremity; Emphysema of lung; Hepatitis B; Hepatitis C; Herniated disc; Hyperlipidemia; Kidney stone; Lung disease; Oxygen dependent; Supraventricular tachycardia; and Urinary tract infection.    History of Present Illness: Pt was worked up in clinic and today's procedure was scheduled.  Please see H&P for full details.    Hospital Course: Pt presented on the day of surgery and after proper consents were obtained he was brought to the OR where his procedure was performed without difficulty.    Discharge Disposition: Home    Followup Plan:  1. Pt is provided with medications for pain and others as indicated.  2. RTC in 2 weeks

## 2017-04-18 NOTE — PLAN OF CARE
Gave discharge instructions to patient and , verbalized understanding.  All questions answered to the satisfaction of patient and .  To POV via WC, into POV without difficulty, distress or assist.

## 2017-04-18 NOTE — DISCHARGE INSTRUCTIONS
Pull string to remove stent on Monday.      General Information:    1.  Do not drink alcoholic beverages including beer for 24 hours or as long as you are on pain medication..  2.  Do not drive a motor vehicle, operate machinery or power tools, or signs legal papers for 24 hours or as long as you are on pain medication.   3.  You may experience light-headedness, dizziness, and sleepiness following surgery. Please do not stay alone. A responsible adult should be with you for this 24 hour period.  4.  Go home and rest.  5. Progress slowly to a normal diet unless instructed.  Otherwise, begin with liquids such as soft drinks, then soup and crackers working up to solid foods. Drink plenty of nonalcoholic fluids.  6.  Certain anesthetics and pain medications produce nausea and vomiting in certain       individuals. If nausea becomes a problem at home, call you doctor.  7. A nurse will be calling you sometime after surgery. Do not be alarmed. This is our way of finding out how you are doing.  8. Several times every hour while you are awake, take 2-3 deep breaths and cough. If you had stomach surgery hold a pillow or rolled towel firmly against your stomach before you cough. This will help with any pain the cough might cause.  9. Several times every hour while you are awake, pump and flex your feet 5-6 times and do foot circles. This will help prevent blood clots.  10.Call your doctor for severe pain, bleeding, fever, or signs or symptoms of infection (pain, swelling, redness, foul odor, drainage).  11.You can contact your doctor anytime by [phone] for the [facility] (at [facility]) or [phone] for the [facility] on [facility].   [email or web address] is another way to contact your doctor if you are an active participant online with [facility].        Ciprofloxacin tablets  What is this medicine?  CIPROFLOXACIN (sip sandeep FLOX a sin) is a quinolone antibiotic. It is used to treat certain kinds of  bacterial infections. It will not work for colds, flu, or other viral infections.  How should I use this medicine?  Take this medicine by mouth with a glass of water. Follow the directions on the prescription label. Take your medicine at regular intervals. Do not take your medicine more often than directed. Take all of your medicine as directed even if you think your are better. Do not skip doses or stop your medicine early.  You can take this medicine with food or on an empty stomach. It can be taken with a meal that contains dairy or calcium, but do not take it alone with a dairy product, like milk or yogurt or calcium-fortified juice.  A special MedGuide will be given to you by the pharmacist with each prescription and refill. Be sure to read this information carefully each time.  Talk to your pediatrician regarding the use of this medicine in children. Special care may be needed.  What side effects may I notice from receiving this medicine?  Side effects that you should report to your doctor or health care professional as soon as possible:  · allergic reactions like skin rash or hives, swelling of the face, lips, or tongue  · anxious  · confusion  · depressed mood  · diarrhea  · fast, irregular heartbeat  · hallucination, loss of contact with reality  · joint, muscle, or tendon pain or swelling  · pain, tingling, numbness in the hands or feet  · suicidal thoughts or other mood changes  · sunburn  · unusually weak or tired  Side effects that usually do not require medical attention (report to your doctor or health care professional if they continue or are bothersome):  · dry mouth  · headache  · nausea  · trouble sleeping  What may interact with this medicine?  Do not take this medicine with any of the following medications:  · cisapride  · droperidol  · terfenadine  · tizanidine  This medicine may also interact with the following medications:  · antacids  · birth control  pills  · caffeine  · cyclosporin  · didanosine (ddI) buffered tablets or powder  · medicines for diabetes  · medicines for inflammation like ibuprofen, naproxen  · methotrexate  · multivitamins  · omeprazole  · phenytoin  · probenecid  · sucralfate  · theophylline  · warfarin  What if I miss a dose?  If you miss a dose, take it as soon as you can. If it is almost time for your next dose, take only that dose. Do not take double or extra doses.  Where should I keep my medicine?  Keep out of the reach of children.  Store at room temperature below 30 degrees C (86 degrees F). Keep container tightly closed. Throw away any unused medicine after the expiration date.  What should I tell my health care provider before I take this medicine?  They need to know if you have any of these conditions:  · bone problems  · cerebral disease  · history of low levels of potassium in the blood  · joint problems  · irregular heartbeat  · kidney disease  · myasthenia gravis  · seizures  · tendon problems  · tingling of the fingers or toes, or other nerve disorder  · an unusual or allergic reaction to ciprofloxacin, other antibiotics or medicines, foods, dyes, or preservatives  · pregnant or trying to get pregnant  · breast-feeding  What should I watch for while using this medicine?  Tell your doctor or health care professional if your symptoms do not improve.  Do not treat diarrhea with over the counter products. Contact your doctor if you have diarrhea that lasts more than 2 days or if it is severe and watery.  You may get drowsy or dizzy. Do not drive, use machinery, or do anything that needs mental alertness until you know how this medicine affects you. Do not stand or sit up quickly, especially if you are an older patient. This reduces the risk of dizzy or fainting spells.  This medicine can make you more sensitive to the sun. Keep out of the sun. If you cannot avoid being in the sun, wear protective clothing and use sunscreen. Do not  use sun lamps or tanning beds/booths.  Avoid antacids, aluminum, calcium, iron, magnesium, and zinc products for 6 hours before and 2 hours after taking a dose of this medicine.  Date Last Reviewed:   NOTE:This sheet is a summary. It may not cover all possible information. If you have questions about this medicine, talk to your doctor, pharmacist, or health care provider. Copyright© 2016 [employer]        Acetaminophen; Oxycodone tablets  What is this medicine?  ACETAMINOPHEN; OXYCODONE (a set a ARTURO reinier fen; ox i KOE done) is a pain reliever. It is used to treat moderate to severe pain.  How should I use this medicine?  Take this medicine by mouth with a full glass of water. Follow the directions on the prescription label. You can take it with or without food. If it upsets your stomach, take it with food. Take your medicine at regular intervals. Do not take it more often than directed.  A special MedGuide will be given to you by the pharmacist with each prescription and refill. Be sure to read this information carefully each time.  Talk to your pediatrician regarding the use of this medicine in children. Special care may be needed.  What side effects may I notice from receiving this medicine?  Side effects that you should report to your doctor or health care professional as soon as possible:  · allergic reactions like skin rash, itching or hives, swelling of the face, lips, or tongue  · breathing problems  · confusion  · redness, blistering, peeling or loosening of the skin, including inside the mouth  · signs and symptoms of liver injury like dark yellow or brown urine; general ill feeling or flu-like symptoms; light-colored stools; loss of appetite; nausea; right upper belly pain; unusually weak or tired; yellowing of the eyes or skin  · signs and symptoms of low blood pressure like dizziness; feeling faint or lightheaded, falls; unusually weak or tired  · trouble passing urine or change in the amount of  urine  Side effects that usually do not require medical attention (report to your doctor or health care professional if they continue or are bothersome):  · constipation  · dry mouth  · nausea, vomiting  · tiredness  What may interact with this medicine?  This medicine may interact with the following medications:  · alcohol  · antihistamines for allergy, cough and cold  · antiviral medicines for HIV or AIDS  · atropine  · certain antibiotics like clarithromycin, erythromycin, linezolid, rifampin  · certain medicines for anxiety or sleep  · certain medicines for bladder problems like oxybutynin, tolterodine  · certain medicines for depression like amitriptyline, fluoxetine, sertraline  · certain medicines for fungal infections like ketoconazole, itraconazole, voriconazole  · certain medicines for migraine headache like almotriptan, eletriptan, frovatriptan, naratriptan, rizatriptan, sumatriptan, zolmitriptan  · certain medicines for nausea or vomiting like dolasetron, ondansetron, palonosetron  · certain medicines for Parkinson's disease like benztropine, trihexyphenidyl  · certain medicines for seizures like phenobarbital, phenytoin, primidone  · certain medicines for stomach problems like dicyclomine, hyoscyamine  · certain medicines for travel sickness like scopolamine  · diuretics  · general anesthetics like halothane, isoflurane, methoxyflurane, propofol  · ipratropium  · local anesthetics like lidocaine, pramoxine, tetracaine  · MAOIs like Carbex, Eldepryl, Marplan, Nardil, and Parnate  · medicines that relax muscles for surgery  · methylene blue  · nilotinib  · other medicines with acetaminophen  · other narcotic medicines for pain or cough  · phenothiazines like chlorpromazine, mesoridazine, prochlorperazine, thioridazine  What if I miss a dose?  If you miss a dose, take it as soon as you can. If it is almost time for your next dose, take only that dose. Do not take double or extra doses.  Where should I keep  my medicine?  Keep out of the reach of children. This medicine can be abused. Keep your medicine in a safe place to protect it from theft. Do not share this medicine with anyone. Selling or giving away this medicine is dangerous and against the law.  This medicine may cause accidental overdose and death if it taken by other adults, children, or pets. Mix any unused medicine with a substance like cat litter or coffee grounds. Then throw the medicine away in a sealed container like a sealed bag or a coffee can with a lid. Do not use the medicine after the expiration date.  Store at room temperature between 20 and 25 degrees C (68 and 77 degrees F).  What should I tell my health care provider before I take this medicine?  They need to know if you have any of these conditions:  · brain tumor  · Crohn's disease, inflammatory bowel disease, or ulcerative colitis  · drug abuse or addiction  · head injury  · heart or circulation problems  · if you often drink alcohol  · kidney disease or problems going to the bathroom  · liver disease  · lung disease, asthma, or breathing problems  · an unusual or allergic reaction to acetaminophen, oxycodone, other opioid analgesics, other medicines, foods, dyes, or preservatives  · pregnant or trying to get pregnant  · breast-feeding  What should I watch for while using this medicine?  Tell your doctor or health care professional if your pain does not go away, if it gets worse, or if you have new or a different type of pain. You may develop tolerance to the medicine. Tolerance means that you will need a higher dose of the medication for pain relief. Tolerance is normal and is expected if you take this medicine for a long time.  Do not suddenly stop taking your medicine because you may develop a severe reaction. Your body becomes used to the medicine. This does NOT mean you are addicted. Addiction is a behavior related to getting and using a drug for a non-medical reason. If you have pain,  you have a medical reason to take pain medicine. Your doctor will tell you how much medicine to take. If your doctor wants you to stop the medicine, the dose will be slowly lowered over time to avoid any side effects.  There are different types of narcotic medicines (opiates). If you take more than one type at the same time or if you are taking another medicine that also causes drowsiness, you may have more side effects. Give your health care provider a list of all medicines you use. Your doctor will tell you how much medicine to take. Do not take more medicine than directed. Call emergency for help if you have problems breathing or unusual sleepiness.  Do not take other medicines that contain acetaminophen with this medicine. Always read labels carefully. If you have questions, ask your doctor or pharmacist.  If you take too much acetaminophen get medical help right away. Too much acetaminophen can be very dangerous and cause liver damage. Even if you do not have symptoms, it is important to get help right away.  You may get drowsy or dizzy. Do not drive, use machinery, or do anything that needs mental alertness until you know how this medicine affects you. Do not stand or sit up quickly, especially if you are an older patient. This reduces the risk of dizzy or fainting spells. Alcohol may interfere with the effect of this medicine. Avoid alcoholic drinks.  The medicine will cause constipation. Try to have a bowel movement at least every 2 to 3 days. If you do not have a bowel movement for 3 days, call your doctor or health care professional.  Your mouth may get dry. Chewing sugarless gum or sucking hard candy, and drinking plenty or water may help. Contact your doctor if the problem does not go away or is severe.  Date Last Reviewed:   NOTE:This sheet is a summary. It may not cover all possible information. If you have questions about this medicine, talk to your doctor, pharmacist, or health care provider.  Copyright© 2016 [employer]        Docusate capsules  What is this medicine?  DOCUSATE (doc CUE sayt) is stool softener. It helps prevent constipation and straining or discomfort associated with hard or dry stools.  How should I use this medicine?  Take this medicine by mouth with a glass of water. Follow the directions on the label. Take your doses at regular intervals. Do not take your medicine more often than directed.  Talk to your pediatrician regarding the use of this medicine in children. While this medicine may be prescribed for children as young as 2 years for selected conditions, precautions do apply.  What side effects may I notice from receiving this medicine?  Side effects that you should report to your doctor or health care professional as soon as possible:  · allergic reactions like skin rash, itching or hives, swelling of the face, lips, or tongue  Side effects that usually do not require medical attention (report to your doctor or health care professional if they continue or are bothersome):  · diarrhea  · stomach cramps  · throat irritation  What may interact with this medicine?  · mineral oil  What if I miss a dose?  If you miss a dose, take it as soon as you can. If it is almost time for your next dose, take only that dose. Do not take double or extra doses.  Where should I keep my medicine?  Keep out of the reach of children.  Store at room temperature between 15 and 30 degrees C (59 and 86 degrees F). Throw away any unused medicine after the expiration date.  What should I tell my health care provider before I take this medicine?  They need to know if you have any of these conditions:  · nausea or vomiting  · severe constipation  · stomach pain  · sudden change in bowel habit lasting more than 2 weeks  · an unusual or allergic reaction to docusate, other medicines, foods, dyes, or preservatives  · pregnant or trying to get pregnant  · breast-feeding  What should I watch for while using this  medicine?  Do not use for more than one week without advice from your doctor or health care professional. If your constipation returns, check with your doctor or health care professional.  Drink plenty of water while taking this medicine. Drinking water helps decrease constipation.  Stop using this medicine and contact your doctor or health care professional if you experience any rectal bleeding or do not have a bowel movement after use. These could be signs of a more serious condition.  Date Last Reviewed:   NOTE:This sheet is a summary. It may not cover all possible information. If you have questions about this medicine, talk to your doctor, pharmacist, or health care provider. Copyright© 2016 [employer]        Tamsulosin capsules  What is this medicine?  TAMSULOSIN (diane GOPAL mary beth sin) is used to treat enlargement of the prostate gland in men, a condition called benign prostatic hyperplasia or BPH. It is not for use in women. It works by relaxing muscles in the prostate and bladder neck. This improves urine flow and reduces BPH symptoms.  How should I use this medicine?  Take this medicine by mouth about 30 minutes after the same meal every day. Follow the directions on the prescription label. Swallow the capsules whole with a glass of water. Do not crush, chew, or open capsules. Do not take your medicine more often than directed. Do not stop taking your medicine unless your doctor tells you to.  Talk to your pediatrician regarding the use of this medicine in children. Special care may be needed.  What side effects may I notice from receiving this medicine?  Side effects that you should report to your doctor or health care professional as soon as possible:  · allergic reactions like skin rash or itching, hives, swelling of the lips, mouth, tongue, or throat  · breathing problems  · change in vision  · feeling faint or lightheaded  · irregular heartbeat  · prolonged or painful erection  · weakness  Side effects  that usually do not require medical attention (report to your doctor or health care professional if they continue or are bothersome):  · back pain  · change in sex drive or performance  · constipation, nausea or vomiting  · cough  · drowsy  · runny or stuffy nose  · trouble sleeping  What may interact with this medicine?  · cimetidine  · fluoxetine  · ketoconazole  · medicines for erectile disfunction like sildenafil, tadalafil, vardenafil  · medicines for high blood pressure  · other alpha-blockers like alfuzosin, doxazosin, phentolamine, phenoxybenzamine, prazosin, terazosin  · warfarin  What if I miss a dose?  If you miss a dose, take it as soon as you can. If it is almost time for your next dose, take only that dose. Do not take double or extra doses. If you stop taking your medicine for several days or more, ask your doctor or health care professional what dose you should start back on.  Where should I keep my medicine?  Keep out of the reach of children.  Store at room temperature between 15 and 30 degrees C (59 and 86 degrees F). Throw away any unused medicine after the expiration date.  What should I tell my health care provider before I take this medicine?  They need to know if you have any of the following conditions:  · advanced kidney disease  · advanced liver disease  · low blood pressure  · prostate cancer  · an unusual or allergic reaction to tamsulosin, sulfa drugs, other medicines, foods, dyes, or preservatives  · pregnant or trying to get pregnant  · breast-feeding  What should I watch for while using this medicine?  Visit your doctor or health care professional for regular check ups. You will need lab work done before you start this medicine and regularly while you are taking it. Check your blood pressure as directed. Ask your health care professional what your blood pressure should be, and when you should contact him or her.  This medicine may make you feel dizzy or lightheaded. This is more  likely to happen after the first dose, after an increase in dose, or during hot weather or exercise. Drinking alcohol and taking some medicines can make this worse. Do not drive, use machinery, or do anything that needs mental alertness until you know how this medicine affects you. Do not sit or stand up quickly. If you begin to feel dizzy, sit down until you feel better. These effects can decrease once your body adjusts to the medicine.  Contact your doctor or health care professional right away if you have an erection that lasts longer than 4 hours or if it becomes painful. This may be a sign of a serious problem and must be treated right away to prevent permanent damage.  If you are thinking of having cataract surgery, tell your eye surgeon that you have taken this medicine.  Date Last Reviewed:   NOTE:This sheet is a summary. It may not cover all possible information. If you have questions about this medicine, talk to your doctor, pharmacist, or health care provider. Copyright© 2016 [employer]

## 2017-04-18 NOTE — IP AVS SNAPSHOT
[address] [place] [address]           Patient Discharge Instructions   Our goal is to set you up for success. This packet includes information on your condition, medications, and your home care.  It will help you care for yourself to prevent having to return to the hospital.     Please ask your nurse if you have any questions.      There are many details to remember when preparing to leave the hospital. Here is what you will need to do:    1. Take your medicine. If you are prescribed medications, review your Medication List on the following pages. You may have new medications to  at the pharmacy and others that you'll need to stop taking. Review the instructions for how and when to take your medications. Talk with your doctor or nurses if you are unsure of what to do.     2. Go to your follow-up appointments. Specific follow-up information is listed in the following pages. Your may be contacted by a nurse or clinical provider about future appointments. Be sure we have all of the phone numbers to reach you. Please contact your provider's office if you are unable to make an appointment.     3. Watch for warning signs. Your doctor or nurse will give you detailed warning signs to watch for and when to call for assistance. These instructions may also include educational information about your condition. If you experience any of warning signs to your health, call your doctor.               ** Verify the list of medication(s) below is accurate and up to date. Carry this with you in case of emergency. If your medications have changed, please notify your healthcare provider.             Medication List      START taking these medications        Additional Info                      ciprofloxacin HCl 500 MG tablet   Commonly known as:  CIPRO   Quantity:  6 tablet   Refills:  0   Dose:  500 mg    Instructions:  Take 1 tablet (500 mg total) by mouth every 12 (twelve)  hours.     Begin Date    AM    Noon    PM    Bedtime         CHANGE how you take these medications        Additional Info                      * docusate sodium 100 MG capsule   Commonly known as:  COLACE   Quantity:  60 capsule   Refills:  0   Dose:  100 mg   What changed:  Another medication with the same name was added. Make sure you understand how and when to take each.    Instructions:  Take 1 capsule (100 mg total) by mouth 2 (two) times daily.     Begin Date    AM    Noon    PM    Bedtime       * docusate sodium 100 MG capsule   Commonly known as:  COLACE   Quantity:  60 capsule   Refills:  0   Dose:  100 mg   What changed:  You were already taking a medication with the same name, and this prescription was added. Make sure you understand how and when to take each.    Instructions:  Take 1 capsule (100 mg total) by mouth 2 (two) times daily.     Begin Date    AM    Noon    PM    Bedtime       roflumilast 500 mcg Tab   Commonly known as:  DALIRESP   Quantity:  90 tablet   Refills:  3   Dose:  500 mcg   What changed:  additional instructions   Comments:  Please call patient to pick prescription once it is ready.    Instructions:  Take 1 tablet (500 mcg total) by mouth once daily. Medically necessary     Begin Date    AM    Noon    PM    Bedtime       * tamsulosin 0.4 mg Cp24   Commonly known as:  FLOMAX   Quantity:  30 capsule   Refills:  1   Dose:  0.4 mg   What changed:  Another medication with the same name was added. Make sure you understand how and when to take each.    Instructions:  Take 1 capsule (0.4 mg total) by mouth once daily.     Begin Date    AM    Noon    PM    Bedtime       * tamsulosin 0.4 mg Cp24   Commonly known as:  FLOMAX   Quantity:  30 capsule   Refills:  1   Dose:  0.4 mg   What changed:  You were already taking a medication with the same name, and this prescription was added. Make sure you understand how and when to take each.    Instructions:  Take 1 capsule (0.4 mg total) by mouth  once daily.     Begin Date    AM    Noon    PM    Bedtime       * Notice:  This list has 4 medication(s) that are the same as other medications prescribed for you. Read the directions carefully, and ask your doctor or other care provider to review them with you.      CONTINUE taking these medications        Additional Info                      ADVAIR DISKUS 500-50 mcg/dose Dsdv diskus inhaler   Quantity:  180 each   Refills:  3   Generic drug:  fluticasone-salmeterol 500-50 mcg/dose    Instructions:  INHALE ONE DOSE BY MOUTH TWICE DAILY     Begin Date    AM    Noon    PM    Bedtime       * albuterol 90 mcg/actuation inhaler   Quantity:  8.5 g   Refills:  11   Dose:  2 puff    Instructions:  Inhale 2 puffs into the lungs every 4 to 6 hours as needed for Wheezing.     Begin Date    AM    Noon    PM    Bedtime       * albuterol 1.25 mg/3 mL Nebu   Commonly known as:  ACCUNEB   Quantity:  270 vial   Refills:  11   Dose:  2.5 mg   Comments:  Please call patient to pick prescription once it is ready.    Instructions:  Take 6 mLs (2.5 mg total) by nebulization 3 (three) times daily as needed.     Begin Date    AM    Noon    PM    Bedtime       baclofen 10 MG tablet   Commonly known as:  LIORESAL   Refills:  0   Dose:  10 mg    Instructions:  Take 10 mg by mouth nightly as needed.     Begin Date    AM    Noon    PM    Bedtime       blood sugar diagnostic Strp   Refills:  0   Dose:  1 each    Instructions:  1 each.     Begin Date    AM    Noon    PM    Bedtime       blood-glucose meter Misc   Refills:  0    Instructions:  Use to check blood sugar     Begin Date    AM    Noon    PM    Bedtime       clonazePAM 1 MG tablet   Commonly known as:  KLONOPIN   Refills:  0   Dose:  1 mg    Instructions:  Take 1 mg by mouth every evening.     Begin Date    AM    Noon    PM    Bedtime       inhalation spacing device   Commonly known as:  VORTEX HOLDING CHAMBER   Quantity:  1 Device   Refills:  prn    Instructions:  Use as directed for  inhalation. For use with albuterol inhaler.     Begin Date    AM    Noon    PM    Bedtime       oxycodone-acetaminophen 5-325 mg per tablet   Commonly known as:  PERCOCET   Quantity:  30 tablet   Refills:  0   Dose:  1-2 tablet    Instructions:  Take 1-2 tablets by mouth every 4 (four) hours as needed for Pain.     Begin Date    AM    Noon    PM    Bedtime       predniSONE 10 MG tablet   Commonly known as:  DELTASONE   Quantity:  30 tablet   Refills:  0   Dose:  10 mg    Instructions:  Take 1 tablet (10 mg total) by mouth once daily.     Begin Date    AM    Noon    PM    Bedtime       quetiapine 50 MG tablet   Commonly known as:  SEROQUEL   Refills:  0   Dose:  50 mg    Instructions:  Take 50 mg by mouth nightly.     Begin Date    AM    Noon    PM    Bedtime       ranitidine 150 MG tablet   Commonly known as:  ZANTAC   Refills:  0   Dose:  300 mg    Instructions:  Take 300 mg by mouth nightly.     Begin Date    AM    Noon    PM    Bedtime       tramadol 50 mg tablet   Commonly known as:  ULTRAM   Refills:  0    Instructions:  TAKE ONE TABLET BY MOUTH EVERY 6 HOURS AS NEEDED FOR PAIN     Begin Date    AM    Noon    PM    Bedtime       trazodone 150 MG tablet   Commonly known as:  DESYREL   Refills:  0   Dose:  150 mg    Instructions:  Take 150 mg by mouth nightly.     Begin Date    AM    Noon    PM    Bedtime       umeclidinium 62.5 mcg/actuation Dsdv   Commonly known as:  INCRUSE ELLIPTA   Quantity:  30 each   Refills:  11   Dose:  62.5 mcg    Instructions:  Inhale 62.5 mcg into the lungs once daily. Medically necessary     Begin Date    AM    Noon    PM    Bedtime       * Notice:  This list has 2 medication(s) that are the same as other medications prescribed for you. Read the directions carefully, and ask your doctor or other care provider to review them with you.         Where to Get Your Medications      These medications were sent to [id number] - [place], [place] - [address] AT [place]  [address]  [address] [place] [address]     Phone:  [phone]     ciprofloxacin HCl 500 MG tablet    docusate sodium 100 MG capsule    oxycodone-acetaminophen 5-325 mg per tablet    tamsulosin 0.4 mg Cp24                  Please bring to all follow up appointments:    1. A copy of your discharge instructions.  2. All medicines you are currently taking in their original bottles.  3. Identification and insurance card.    Please arrive 15 minutes ahead of scheduled appointment time.    Please call 24 hours in advance if you must reschedule your appointment and/or time.        Your Scheduled Appointments     Apr 25, 2017  8:45 AM CDT   Diagnostic Xray with ONLH XR1-   [facility]    [address] [place] [id number] [phone]            May 02, 2017  8:00 AM CDT   Us Retroper with ONLH US1   [facility]    [address] [place] [address] [id number] [phone]            May 02, 2017  9:00 AM CDT   Diagnostic Xray with ONLH XR1-   [facility]    [address] [place] [address] [id number] [phone]            May 03, 2017  9:40 AM CDT   Post OP with Pro Darby IV, MD   [facility] ([facility])    [address] [place] [address] [id number] [phone]            Jun 30, 2017  2:30 PM CDT   CT Chest with [facility] CT1 LIMIT 500 LBS   [facility]    [address] [facility] [address] [phone]                Discharge Instructions     Future Orders    US Retroperitoneal Complete (Kidney and     Process Instructions:    No food or drink after midnight (8 hours before exam time for an afternoon appointment).  Oral medication with a small amount of water the morning before test is acceptable.    Questions:    Reason for Exam:      Is the patient pregnant?:      May the  Radiologist modify the order per protocol to meet the clinical needs of the patient?:  Yes    X-Ray Abdomen AP 1 View     Questions:    Reason for Exam:      Is the patient pregnant?:      Activity as tolerated     Call MD for:  persistent nausea and vomiting     Call MD for:  severe uncontrolled pain     Call MD for:  temperature >100.4     Diet general     Questions:    Total calories:      Fat restriction, if any:      Protein restriction, if any:      Na restriction, if any:      Fluid restriction:      Additional restrictions:      Shower on day dressing removed (No bath)         Discharge Instructions         General Information:    1.  Do not drink alcoholic beverages including beer for 24 hours or as long as you are on pain medication..  2.  Do not drive a motor vehicle, operate machinery or power tools, or signs legal papers for 24 hours or as long as you are on pain medication.   3.  You may experience light-headedness, dizziness, and sleepiness following surgery. Please do not stay alone. A responsible adult should be with you for this 24 hour period.  4.  Go home and rest.  5. Progress slowly to a normal diet unless instructed.  Otherwise, begin with liquids such as soft drinks, then soup and crackers working up to solid foods. Drink plenty of nonalcoholic fluids.  6.  Certain anesthetics and pain medications produce nausea and vomiting in certain       individuals. If nausea becomes a problem at home, call you doctor.  7. A nurse will be calling you sometime after surgery. Do not be alarmed. This is our way of finding out how you are doing.  8. Several times every hour while you are awake, take 2-3 deep breaths and cough. If you had stomach surgery hold a pillow or rolled towel firmly against your stomach before you cough. This will help with any pain the cough might cause.  9. Several times every hour while you are awake, pump and flex your feet 5-6 times and do foot circles. This will help prevent  blood clots.  10.Call your doctor for severe pain, bleeding, fever, or signs or symptoms of infection (pain, swelling, redness, foul odor, drainage).  11.You can contact your doctor anytime by [phone] for the [facility] (at [facility]) or [phone] for the [facility] on [facility].   [email or web address] is another way to contact your doctor if you are an active participant online with [facility].        Ciprofloxacin tablets  What is this medicine?  CIPROFLOXACIN (sip sandeep FLOX a sin) is a quinolone antibiotic. It is used to treat certain kinds of bacterial infections. It will not work for colds, flu, or other viral infections.  How should I use this medicine?  Take this medicine by mouth with a glass of water. Follow the directions on the prescription label. Take your medicine at regular intervals. Do not take your medicine more often than directed. Take all of your medicine as directed even if you think your are better. Do not skip doses or stop your medicine early.  You can take this medicine with food or on an empty stomach. It can be taken with a meal that contains dairy or calcium, but do not take it alone with a dairy product, like milk or yogurt or calcium-fortified juice.  A special MedGuide will be given to you by the pharmacist with each prescription and refill. Be sure to read this information carefully each time.  Talk to your pediatrician regarding the use of this medicine in children. Special care may be needed.  What side effects may I notice from receiving this medicine?  Side effects that you should report to your doctor or health care professional as soon as possible:  · allergic reactions like skin rash or hives, swelling of the face, lips, or tongue  · anxious  · confusion  · depressed mood  · diarrhea  · fast, irregular heartbeat  · hallucination, loss of contact with reality  · joint, muscle, or tendon pain or swelling  · pain, tingling, numbness in the hands or  feet  · suicidal thoughts or other mood changes  · sunburn  · unusually weak or tired  Side effects that usually do not require medical attention (report to your doctor or health care professional if they continue or are bothersome):  · dry mouth  · headache  · nausea  · trouble sleeping  What may interact with this medicine?  Do not take this medicine with any of the following medications:  · cisapride  · droperidol  · terfenadine  · tizanidine  This medicine may also interact with the following medications:  · antacids  · birth control pills  · caffeine  · cyclosporin  · didanosine (ddI) buffered tablets or powder  · medicines for diabetes  · medicines for inflammation like ibuprofen, naproxen  · methotrexate  · multivitamins  · omeprazole  · phenytoin  · probenecid  · sucralfate  · theophylline  · warfarin  What if I miss a dose?  If you miss a dose, take it as soon as you can. If it is almost time for your next dose, take only that dose. Do not take double or extra doses.  Where should I keep my medicine?  Keep out of the reach of children.  Store at room temperature below 30 degrees C (86 degrees F). Keep container tightly closed. Throw away any unused medicine after the expiration date.  What should I tell my health care provider before I take this medicine?  They need to know if you have any of these conditions:  · bone problems  · cerebral disease  · history of low levels of potassium in the blood  · joint problems  · irregular heartbeat  · kidney disease  · myasthenia gravis  · seizures  · tendon problems  · tingling of the fingers or toes, or other nerve disorder  · an unusual or allergic reaction to ciprofloxacin, other antibiotics or medicines, foods, dyes, or preservatives  · pregnant or trying to get pregnant  · breast-feeding  What should I watch for while using this medicine?  Tell your doctor or health care professional if your symptoms do not improve.  Do not treat diarrhea with over the counter  products. Contact your doctor if you have diarrhea that lasts more than 2 days or if it is severe and watery.  You may get drowsy or dizzy. Do not drive, use machinery, or do anything that needs mental alertness until you know how this medicine affects you. Do not stand or sit up quickly, especially if you are an older patient. This reduces the risk of dizzy or fainting spells.  This medicine can make you more sensitive to the sun. Keep out of the sun. If you cannot avoid being in the sun, wear protective clothing and use sunscreen. Do not use sun lamps or tanning beds/booths.  Avoid antacids, aluminum, calcium, iron, magnesium, and zinc products for 6 hours before and 2 hours after taking a dose of this medicine.  Date Last Reviewed:   NOTE:This sheet is a summary. It may not cover all possible information. If you have questions about this medicine, talk to your doctor, pharmacist, or health care provider. Copyright© 2016 [employer]        Acetaminophen; Oxycodone tablets  What is this medicine?  ACETAMINOPHEN; OXYCODONE (a set a ARTURO reinier fen; ox i KOE done) is a pain reliever. It is used to treat moderate to severe pain.  How should I use this medicine?  Take this medicine by mouth with a full glass of water. Follow the directions on the prescription label. You can take it with or without food. If it upsets your stomach, take it with food. Take your medicine at regular intervals. Do not take it more often than directed.  A special MedGuide will be given to you by the pharmacist with each prescription and refill. Be sure to read this information carefully each time.  Talk to your pediatrician regarding the use of this medicine in children. Special care may be needed.  What side effects may I notice from receiving this medicine?  Side effects that you should report to your doctor or health care professional as soon as possible:  · allergic reactions like skin rash, itching or hives, swelling of the face, lips, or  tongue  · breathing problems  · confusion  · redness, blistering, peeling or loosening of the skin, including inside the mouth  · signs and symptoms of liver injury like dark yellow or brown urine; general ill feeling or flu-like symptoms; light-colored stools; loss of appetite; nausea; right upper belly pain; unusually weak or tired; yellowing of the eyes or skin  · signs and symptoms of low blood pressure like dizziness; feeling faint or lightheaded, falls; unusually weak or tired  · trouble passing urine or change in the amount of urine  Side effects that usually do not require medical attention (report to your doctor or health care professional if they continue or are bothersome):  · constipation  · dry mouth  · nausea, vomiting  · tiredness  What may interact with this medicine?  This medicine may interact with the following medications:  · alcohol  · antihistamines for allergy, cough and cold  · antiviral medicines for HIV or AIDS  · atropine  · certain antibiotics like clarithromycin, erythromycin, linezolid, rifampin  · certain medicines for anxiety or sleep  · certain medicines for bladder problems like oxybutynin, tolterodine  · certain medicines for depression like amitriptyline, fluoxetine, sertraline  · certain medicines for fungal infections like ketoconazole, itraconazole, voriconazole  · certain medicines for migraine headache like almotriptan, eletriptan, frovatriptan, naratriptan, rizatriptan, sumatriptan, zolmitriptan  · certain medicines for nausea or vomiting like dolasetron, ondansetron, palonosetron  · certain medicines for Parkinson's disease like benztropine, trihexyphenidyl  · certain medicines for seizures like phenobarbital, phenytoin, primidone  · certain medicines for stomach problems like dicyclomine, hyoscyamine  · certain medicines for travel sickness like scopolamine  · diuretics  · general anesthetics like halothane, isoflurane, methoxyflurane, propofol  · ipratropium  · local  anesthetics like lidocaine, pramoxine, tetracaine  · MAOIs like Carbex, Eldepryl, Marplan, Nardil, and Parnate  · medicines that relax muscles for surgery  · methylene blue  · nilotinib  · other medicines with acetaminophen  · other narcotic medicines for pain or cough  · phenothiazines like chlorpromazine, mesoridazine, prochlorperazine, thioridazine  What if I miss a dose?  If you miss a dose, take it as soon as you can. If it is almost time for your next dose, take only that dose. Do not take double or extra doses.  Where should I keep my medicine?  Keep out of the reach of children. This medicine can be abused. Keep your medicine in a safe place to protect it from theft. Do not share this medicine with anyone. Selling or giving away this medicine is dangerous and against the law.  This medicine may cause accidental overdose and death if it taken by other adults, children, or pets. Mix any unused medicine with a substance like cat litter or coffee grounds. Then throw the medicine away in a sealed container like a sealed bag or a coffee can with a lid. Do not use the medicine after the expiration date.  Store at room temperature between 20 and 25 degrees C (68 and 77 degrees F).  What should I tell my health care provider before I take this medicine?  They need to know if you have any of these conditions:  · brain tumor  · Crohn's disease, inflammatory bowel disease, or ulcerative colitis  · drug abuse or addiction  · head injury  · heart or circulation problems  · if you often drink alcohol  · kidney disease or problems going to the bathroom  · liver disease  · lung disease, asthma, or breathing problems  · an unusual or allergic reaction to acetaminophen, oxycodone, other opioid analgesics, other medicines, foods, dyes, or preservatives  · pregnant or trying to get pregnant  · breast-feeding  What should I watch for while using this medicine?  Tell your doctor or health care professional if your pain does not go  away, if it gets worse, or if you have new or a different type of pain. You may develop tolerance to the medicine. Tolerance means that you will need a higher dose of the medication for pain relief. Tolerance is normal and is expected if you take this medicine for a long time.  Do not suddenly stop taking your medicine because you may develop a severe reaction. Your body becomes used to the medicine. This does NOT mean you are addicted. Addiction is a behavior related to getting and using a drug for a non-medical reason. If you have pain, you have a medical reason to take pain medicine. Your doctor will tell you how much medicine to take. If your doctor wants you to stop the medicine, the dose will be slowly lowered over time to avoid any side effects.  There are different types of narcotic medicines (opiates). If you take more than one type at the same time or if you are taking another medicine that also causes drowsiness, you may have more side effects. Give your health care provider a list of all medicines you use. Your doctor will tell you how much medicine to take. Do not take more medicine than directed. Call emergency for help if you have problems breathing or unusual sleepiness.  Do not take other medicines that contain acetaminophen with this medicine. Always read labels carefully. If you have questions, ask your doctor or pharmacist.  If you take too much acetaminophen get medical help right away. Too much acetaminophen can be very dangerous and cause liver damage. Even if you do not have symptoms, it is important to get help right away.  You may get drowsy or dizzy. Do not drive, use machinery, or do anything that needs mental alertness until you know how this medicine affects you. Do not stand or sit up quickly, especially if you are an older patient. This reduces the risk of dizzy or fainting spells. Alcohol may interfere with the effect of this medicine. Avoid alcoholic drinks.  The medicine will cause  constipation. Try to have a bowel movement at least every 2 to 3 days. If you do not have a bowel movement for 3 days, call your doctor or health care professional.  Your mouth may get dry. Chewing sugarless gum or sucking hard candy, and drinking plenty or water may help. Contact your doctor if the problem does not go away or is severe.  Date Last Reviewed:   NOTE:This sheet is a summary. It may not cover all possible information. If you have questions about this medicine, talk to your doctor, pharmacist, or health care provider. Copyright© 2016 [employer]        Docusate capsules  What is this medicine?  DOCUSATE (doc CUE sayt) is stool softener. It helps prevent constipation and straining or discomfort associated with hard or dry stools.  How should I use this medicine?  Take this medicine by mouth with a glass of water. Follow the directions on the label. Take your doses at regular intervals. Do not take your medicine more often than directed.  Talk to your pediatrician regarding the use of this medicine in children. While this medicine may be prescribed for children as young as 2 years for selected conditions, precautions do apply.  What side effects may I notice from receiving this medicine?  Side effects that you should report to your doctor or health care professional as soon as possible:  · allergic reactions like skin rash, itching or hives, swelling of the face, lips, or tongue  Side effects that usually do not require medical attention (report to your doctor or health care professional if they continue or are bothersome):  · diarrhea  · stomach cramps  · throat irritation  What may interact with this medicine?  · mineral oil  What if I miss a dose?  If you miss a dose, take it as soon as you can. If it is almost time for your next dose, take only that dose. Do not take double or extra doses.  Where should I keep my medicine?  Keep out of the reach of children.  Store at room temperature between 15 and  30 degrees C (59 and 86 degrees F). Throw away any unused medicine after the expiration date.  What should I tell my health care provider before I take this medicine?  They need to know if you have any of these conditions:  · nausea or vomiting  · severe constipation  · stomach pain  · sudden change in bowel habit lasting more than 2 weeks  · an unusual or allergic reaction to docusate, other medicines, foods, dyes, or preservatives  · pregnant or trying to get pregnant  · breast-feeding  What should I watch for while using this medicine?  Do not use for more than one week without advice from your doctor or health care professional. If your constipation returns, check with your doctor or health care professional.  Drink plenty of water while taking this medicine. Drinking water helps decrease constipation.  Stop using this medicine and contact your doctor or health care professional if you experience any rectal bleeding or do not have a bowel movement after use. These could be signs of a more serious condition.  Date Last Reviewed:   NOTE:This sheet is a summary. It may not cover all possible information. If you have questions about this medicine, talk to your doctor, pharmacist, or health care provider. Copyright© 2016 [employer]        Tamsulosin capsules  What is this medicine?  TAMSULOSIN (diane GOPAL mary beth sin) is used to treat enlargement of the prostate gland in men, a condition called benign prostatic hyperplasia or BPH. It is not for use in women. It works by relaxing muscles in the prostate and bladder neck. This improves urine flow and reduces BPH symptoms.  How should I use this medicine?  Take this medicine by mouth about 30 minutes after the same meal every day. Follow the directions on the prescription label. Swallow the capsules whole with a glass of water. Do not crush, chew, or open capsules. Do not take your medicine more often than directed. Do not stop taking your medicine unless your doctor tells  you to.  Talk to your pediatrician regarding the use of this medicine in children. Special care may be needed.  What side effects may I notice from receiving this medicine?  Side effects that you should report to your doctor or health care professional as soon as possible:  · allergic reactions like skin rash or itching, hives, swelling of the lips, mouth, tongue, or throat  · breathing problems  · change in vision  · feeling faint or lightheaded  · irregular heartbeat  · prolonged or painful erection  · weakness  Side effects that usually do not require medical attention (report to your doctor or health care professional if they continue or are bothersome):  · back pain  · change in sex drive or performance  · constipation, nausea or vomiting  · cough  · drowsy  · runny or stuffy nose  · trouble sleeping  What may interact with this medicine?  · cimetidine  · fluoxetine  · ketoconazole  · medicines for erectile disfunction like sildenafil, tadalafil, vardenafil  · medicines for high blood pressure  · other alpha-blockers like alfuzosin, doxazosin, phentolamine, phenoxybenzamine, prazosin, terazosin  · warfarin  What if I miss a dose?  If you miss a dose, take it as soon as you can. If it is almost time for your next dose, take only that dose. Do not take double or extra doses. If you stop taking your medicine for several days or more, ask your doctor or health care professional what dose you should start back on.  Where should I keep my medicine?  Keep out of the reach of children.  Store at room temperature between 15 and 30 degrees C (59 and 86 degrees F). Throw away any unused medicine after the expiration date.  What should I tell my health care provider before I take this medicine?  They need to know if you have any of the following conditions:  · advanced kidney disease  · advanced liver disease  · low blood pressure  · prostate cancer  · an unusual or allergic reaction to tamsulosin, sulfa drugs, other  medicines, foods, dyes, or preservatives  · pregnant or trying to get pregnant  · breast-feeding  What should I watch for while using this medicine?  Visit your doctor or health care professional for regular check ups. You will need lab work done before you start this medicine and regularly while you are taking it. Check your blood pressure as directed. Ask your health care professional what your blood pressure should be, and when you should contact him or her.  This medicine may make you feel dizzy or lightheaded. This is more likely to happen after the first dose, after an increase in dose, or during hot weather or exercise. Drinking alcohol and taking some medicines can make this worse. Do not drive, use machinery, or do anything that needs mental alertness until you know how this medicine affects you. Do not sit or stand up quickly. If you begin to feel dizzy, sit down until you feel better. These effects can decrease once your body adjusts to the medicine.  Contact your doctor or health care professional right away if you have an erection that lasts longer than 4 hours or if it becomes painful. This may be a sign of a serious problem and must be treated right away to prevent permanent damage.  If you are thinking of having cataract surgery, tell your eye surgeon that you have taken this medicine.  Date Last Reviewed:   NOTE:This sheet is a summary. It may not cover all possible information. If you have questions about this medicine, talk to your doctor, pharmacist, or health care provider. Copyright© 2016 [employer]            Primary Diagnosis     Your primary diagnosis was:  Urinary Tract Stone      Admission Information     Date & Time Provider Department CSN    4/18/2017 10:10 AM Pro Darby IV, MD [facility] -  [id number]      Care Providers     Provider Role Specialty Primary office phone    Pro Darby IV, MD Attending Provider Urology [phone]    Pro Darby IV, MD  "Surgeon  Urology [phone]      Your Vitals Were     BP Pulse Temp Resp Height Weight    114/57 86 97.7 °F (36.5 °C) 18 5' 4" (1.626 m) 70.3 kg (155 lb)    SpO2 BMI             100% 26.61 kg/m2         Recent Lab Values     No lab values to display.      Pending Labs     Order Current Status    Specimen to Pathology - Surgery Collected (04/18/17 1329)    Urinary Stone Analysis In process      Allergies as of 4/18/2017        Reactions    Macrodantin [Nitrofurantoin Macrocrystalline] Hives, Itching    Nitrofurantoin Hives, Itching    Nitrofurantoin Monohyd/m-cryst Hives, Itching      [facility]     [facility] Care Line - 24/7 Assistance  Unless otherwise directed by your provider, please contact [facility], our nurse care line that is available for 24/7 assistance.     Registered nurses in the [facility] provide clinical advisement, health education, appointment booking, and other advisory services.  Call for this free service at [phone].        Advance Directives     An advance directive is a document which, in the event you are no longer able to make decisions for yourself, tells your healthcare team what kind of treatment you do or do not want to receive, or who you would like to make those decisions for you.  If you do not currently have an advance directive, [facility] encourages you to create one.  For more information call:  [phone]), [phone] ([phone]),  or log on to [email or web address].        Smoking Cessation     If you would like to quit smoking:   You may be eligible for free services if you are a [place] resident and started smoking cigarettes before September 1, 1988.  Call the Smoking Cessation Trust (SCT) toll free at (467) 535-1949 or (171) 418-2226.   Call 1-550-QUIT-NOW if you do not meet the above criteria.   Contact us via email: [email or web address]   View our website for more information: " [email or web address]        Language Assistance Services     ATTENTION: Language assistance services are available, free of charge. Please call [phone].      ATENCIÓN: Si habla alysa, tiene a hernández disposición servicios gratuitos de asistencia lingüística. Llame al [phone].     CHÚ Ý: N?u b?n nói Ti?ng Vi?t, có các d?ch v? h? tr? ngôn ng? mi?n phí dành cho b?n. G?i s? [phone].         [facility] complies with applicable Federal civil rights laws and does not discriminate on the basis of race, color, national origin, age, disability, or sex.

## 2017-04-18 NOTE — OP NOTE
Date of Procedure: 04/18/2017    PREOPERATIVE DIAGNOSIS:  Renal and ureteral stones.                                                                                                           POSTOPERATIVE DIAGNOSIS:  Same.                               PROCEDURES:                                                                  1.  Right ureteroscopy with laser lithotripsy and stone basket extraction.                          2.  Cystoscopy with placement of right double-J ureteral stent.              3.  Right retrograde pyelogram.                                               4.  Fluoro less than 1 hour.                                                 SURGEON:  Mundo Darby IV, M.D.                                          Assistants: None    Specimen: None    Prosthetics, Devices, Grafts: None    ANESTHESIA:  General endotracheal.                                           FINDINGS:  The patient had an approximately 1 cm stone in the right lower pole and a couple fragments in the right ureter from ESWL which was obstructing.  Today was staged procedure to further remove stone burden initially treated with ESWL.  Ureteroscopy was possible after placement of an appropriate guidewire. Ureteral stones were basketed clear.  The renal stone was broken into small pieces. A stone basket was used, and all significant pieces were removed and brought to the back table for pathologic examination.  A 6-Malay x 24 cm double-J ureteral stent was placed. Flouroscopy was used throughout the case for wire and scope guidance, and if applicable to check final stent placement.                                    BLOOD LOSS:  None.                                                           BLOOD GIVEN:  None.                                                          URINE OUTPUT:  None.                                                         DRAINS:  6-Malay x 24 cm right double-J ureteral stent.                      PROCEDURE IN  DETAIL:  After proper consents were obtained, the patient was brought to the Operating Room where he was prepped and draped in normal sterile fashion and provided general endotracheal anesthesia in dorsal lithotomy position.  A 22-Mohawk cystoscope was assembled and introduced per urethra and the bladder was rinsed and drained.  Fluoroscopy was used to evaluate stone position at the start of the case and throughout the case for wire and scope guidance.    An attempt was made to gently pass a guidewire up the ureter and this was done easily then the cystoscope was set aside with the guidewire in place.      The semirigid ureteroscope was then brought to bear and easily passed into the ureteral orifice.  A few stones were seen along the ureter and these were basketed clear.  An amplatz wire was placed, scope set aside, and a ureteral access sheath placed in normal stepwise fashion.  The flexible ureteroscope was used to identify the renal stone.  Half-dilute contrast was used to perform right retrograde pyelogram to ensure all calyces were inspected.  Small laser fiber was used to fragment the stone and all basketable pieces were brought to the back table.  The sheath was then withdrawn under vision and the scope set aside. A 6x24cm stent was advanced coaxially over the wire and a good curl was seen on both ends after placement.    String was left attached and later taped to the mons, so the patient can remove the stent at a later date.      The bladder was then rinsed and drained and stone fragments collected for pathologic examination.  After the bladder was drained, cystoscope was withdrawn and set aside.      The pt tolerated all of this well, and there were no complications.  she was awakened and transferred to Recovery in stable condition.

## 2017-04-20 ENCOUNTER — PATIENT MESSAGE (OUTPATIENT)
Dept: UROLOGY | Facility: CLINIC | Age: 57
End: 2017-04-20

## 2017-04-20 RX ORDER — OXYCODONE AND ACETAMINOPHEN 5; 325 MG/1; MG/1
1 TABLET ORAL EVERY 6 HOURS PRN
Qty: 30 TABLET | Refills: 0 | OUTPATIENT
Start: 2017-04-20

## 2017-04-21 ENCOUNTER — TELEPHONE (OUTPATIENT)
Dept: UROLOGY | Facility: CLINIC | Age: 57
End: 2017-04-21

## 2017-04-21 ENCOUNTER — PATIENT MESSAGE (OUTPATIENT)
Dept: UROLOGY | Facility: CLINIC | Age: 57
End: 2017-04-21

## 2017-04-21 RX ORDER — OXYCODONE AND ACETAMINOPHEN 5; 325 MG/1; MG/1
1 TABLET ORAL EVERY 8 HOURS PRN
Qty: 10 TABLET | Refills: 0 | Status: SHIPPED | OUTPATIENT
Start: 2017-04-21 | End: 2017-06-30

## 2017-04-21 NOTE — TELEPHONE ENCOUNTER
Received call from pt; states her earlier message must have been misunderstood; she is out of her prescribed pain medication and needs more to get through the weekend.

## 2017-04-21 NOTE — TELEPHONE ENCOUNTER
"Received call from patient's pharmacist who states patient is "doctor shopping" and has received narcotic prescriptions from other physicians. Dr. Darby notified.   "

## 2017-05-02 ENCOUNTER — TELEPHONE (OUTPATIENT)
Dept: UROLOGY | Facility: CLINIC | Age: 57
End: 2017-05-02

## 2017-05-02 NOTE — TELEPHONE ENCOUNTER
Sarah Monahan would like to cancel the following appointments:           Pro Darby IV, MD in [facility] ([id number]), 5/3/2017  9:40 AM          Comments:     This appt time is too early for me. I have repeatedly asked      for evening spots & preferably have xrays on same day.      [place] far for [place] & [place] on gas.

## 2017-05-04 LAB — URINARY STONE ANALYSIS: NORMAL

## 2017-05-11 ENCOUNTER — TELEPHONE (OUTPATIENT)
Dept: RADIOLOGY | Facility: HOSPITAL | Age: 57
End: 2017-05-11

## 2017-05-15 ENCOUNTER — TELEPHONE (OUTPATIENT)
Dept: UROLOGY | Facility: CLINIC | Age: 57
End: 2017-05-15

## 2017-05-15 NOTE — TELEPHONE ENCOUNTER
----- Message from Jordyn Fu sent at 5/15/2017  1:37 PM CDT -----  Contact: pt- [phone]  Pt would [place] app[place]  Pt is scheduled for the 23rd.

## 2017-05-16 ENCOUNTER — PATIENT MESSAGE (OUTPATIENT)
Dept: PULMONOLOGY | Facility: CLINIC | Age: 57
End: 2017-05-16

## 2017-05-16 DIAGNOSIS — J44.9 COPD, MODERATE: Primary | ICD-10-CM

## 2017-05-17 ENCOUNTER — TELEPHONE (OUTPATIENT)
Dept: RADIOLOGY | Facility: HOSPITAL | Age: 57
End: 2017-05-17

## 2017-05-17 ENCOUNTER — PATIENT MESSAGE (OUTPATIENT)
Dept: PULMONOLOGY | Facility: CLINIC | Age: 57
End: 2017-05-17

## 2017-05-18 ENCOUNTER — PATIENT MESSAGE (OUTPATIENT)
Dept: UROLOGY | Facility: CLINIC | Age: 57
End: 2017-05-18

## 2017-05-22 ENCOUNTER — PATIENT MESSAGE (OUTPATIENT)
Dept: UROLOGY | Facility: CLINIC | Age: 57
End: 2017-05-22

## 2017-05-24 ENCOUNTER — TELEPHONE (OUTPATIENT)
Dept: UROLOGY | Facility: CLINIC | Age: 57
End: 2017-05-24

## 2017-05-24 NOTE — TELEPHONE ENCOUNTER
Zina with Dr. García's office states patient was seen by Dr. García when Dr. Darby was on vacation. Patient never followed up with them after surgery and they wanted to make sure that Dr. Darby was still caring for her. Informed Zina that patient is currently under our care and has an upcoming appointment. Zina states understanding.

## 2017-05-24 NOTE — TELEPHONE ENCOUNTER
----- Message from Melissa Powell sent at 5/24/2017  3:31 PM CDT -----  Contact: Zina with Dr Jimmy García's Office  [phone]  Please call re this pt

## 2017-06-26 ENCOUNTER — PATIENT MESSAGE (OUTPATIENT)
Dept: UROLOGY | Facility: CLINIC | Age: 57
End: 2017-06-26

## 2017-06-27 ENCOUNTER — TELEPHONE (OUTPATIENT)
Dept: UROLOGY | Facility: CLINIC | Age: 57
End: 2017-06-27

## 2017-06-27 NOTE — TELEPHONE ENCOUNTER
Returned call to pt to inform her that we have not received test results from [facility]--no answer.  Msg left on voice mail.

## 2017-06-29 ENCOUNTER — TELEPHONE (OUTPATIENT)
Dept: RADIOLOGY | Facility: HOSPITAL | Age: 57
End: 2017-06-29

## 2017-06-30 ENCOUNTER — PROCEDURE VISIT (OUTPATIENT)
Dept: PULMONOLOGY | Facility: CLINIC | Age: 57
End: 2017-06-30
Payer: MEDICAID

## 2017-06-30 ENCOUNTER — PATIENT MESSAGE (OUTPATIENT)
Dept: UROLOGY | Facility: CLINIC | Age: 57
End: 2017-06-30

## 2017-06-30 ENCOUNTER — HOSPITAL ENCOUNTER (OUTPATIENT)
Dept: RADIOLOGY | Facility: HOSPITAL | Age: 57
Discharge: HOME | End: 2017-06-30
Attending: INTERNAL MEDICINE
Payer: MEDICAID

## 2017-06-30 ENCOUNTER — OFFICE VISIT (OUTPATIENT)
Dept: PULMONOLOGY | Facility: CLINIC | Age: 57
End: 2017-06-30
Payer: MEDICAID

## 2017-06-30 VITALS
BODY MASS INDEX: 27.31 KG/M2 | SYSTOLIC BLOOD PRESSURE: 112 MMHG | HEART RATE: 84 BPM | HEIGHT: 64 IN | DIASTOLIC BLOOD PRESSURE: 70 MMHG | OXYGEN SATURATION: 96 % | RESPIRATION RATE: 18 BRPM | WEIGHT: 160 LBS

## 2017-06-30 DIAGNOSIS — R59.0 LYMPHADENOPATHY, MEDIASTINAL: ICD-10-CM

## 2017-06-30 DIAGNOSIS — J44.9 COPD, MODERATE: ICD-10-CM

## 2017-06-30 DIAGNOSIS — F17.200 TOBACCO DEPENDENCE: Chronic | ICD-10-CM

## 2017-06-30 DIAGNOSIS — R09.02 HYPOXEMIA REQUIRING SUPPLEMENTAL OXYGEN: ICD-10-CM

## 2017-06-30 DIAGNOSIS — J44.9 STAGE 3 SEVERE COPD BY GOLD CLASSIFICATION: Primary | ICD-10-CM

## 2017-06-30 DIAGNOSIS — J42 CHRONIC WHEEZY BRONCHITIS: ICD-10-CM

## 2017-06-30 DIAGNOSIS — Z99.81 HYPOXEMIA REQUIRING SUPPLEMENTAL OXYGEN: ICD-10-CM

## 2017-06-30 LAB
POST FEF 25 75: 0.49 L/S (ref 1.92–3.13)
POST FET 100: 14.53 S
POST FEV1 FVC: 45 %
POST FEV1: 1.38 L (ref 2.34–2.91)
POST FIF 50: 4.45 L/S
POST FVC: 3.08 L (ref 3.02–3.7)
POST PEF: 3.95 L/S (ref 5.61–7.29)
PRE FEF 25 75: 0.4 L/S (ref 1.92–3.13)
PRE FET 100: 15.56 S
PRE FEV1 FVC: 42 %
PRE FEV1: 1.2 L (ref 2.34–2.91)
PRE FIF 50: 4.17 L/S
PRE FVC: 2.83 L (ref 3.02–3.7)
PRE PEF: 3.71 L/S (ref 5.61–7.29)
PREDICTED FEV1 FVC: 79.12 % (ref 74.22–84.02)
PREDICTED FEV1: 2.63 L (ref 2.34–2.91)
PREDICTED FVC: 3.36 L (ref 3.02–3.7)
PROVOCATION PROTOCOL: ABNORMAL

## 2017-06-30 PROCEDURE — 94060 EVALUATION OF WHEEZING: CPT | Mod: 26,S$PBB,, | Performed by: INTERNAL MEDICINE

## 2017-06-30 PROCEDURE — 99213 OFFICE O/P EST LOW 20 MIN: CPT | Mod: PBBFAC,25,PO | Performed by: INTERNAL MEDICINE

## 2017-06-30 PROCEDURE — 99214 OFFICE O/P EST MOD 30 MIN: CPT | Mod: 25,S$PBB,, | Performed by: INTERNAL MEDICINE

## 2017-06-30 PROCEDURE — 99999 PR PBB SHADOW E&M-EST. PATIENT-LVL III: CPT | Mod: PBBFAC,,, | Performed by: INTERNAL MEDICINE

## 2017-06-30 PROCEDURE — 71260 CT THORAX DX C+: CPT | Mod: 26,,, | Performed by: RADIOLOGY

## 2017-06-30 RX ORDER — PROMETHAZINE HYDROCHLORIDE AND CODEINE PHOSPHATE 6.25; 1 MG/5ML; MG/5ML
5 SOLUTION ORAL EVERY 4 HOURS PRN
Qty: 473 ML | Refills: 0 | Status: SHIPPED | OUTPATIENT
Start: 2017-06-30 | End: 2017-09-15 | Stop reason: SDUPTHER

## 2017-06-30 RX ORDER — PROMETHAZINE HYDROCHLORIDE AND DEXTROMETHORPHAN HYDROBROMIDE 6.25; 15 MG/5ML; MG/5ML
5 SYRUP ORAL
COMMUNITY
Start: 2017-05-17 | End: 2017-06-30

## 2017-06-30 RX ADMIN — IOHEXOL 75 ML: 350 INJECTION, SOLUTION INTRAVENOUS at 02:06

## 2017-06-30 NOTE — PROGRESS NOTES
"Subjective:       Patient ID: Sarah Monahan is a 57 y.o. female.    Chief Complaint: COPD (re ct david) and Insomnia    Ms. Sarah Monahan is 57 years old  I have reviewed the patient's medical history in detail and updated the computerized patient record.  She has Moderate to severe COPD.  FEV1 is 46% predicted with a 15% improvement in FEV1  Has COPD test score is 29  She is on ANORO ELLIPTA and rescue albuterol  She has a chronic cough which appears to be controlled by judicious use of codeine-based cough medication  Reviewed her CAT scan today which shows significant improvement in the hilar adenopathy  She has this persistent cough which I think she may do much better with Bevespi Aerosphere  She will check with her pharmacist  Refills for cough medication were given  She will let me know how she is doing  Smoking cessation discussed          Review of Systems   Constitutional: Negative.    HENT: Negative.    Eyes: Negative.    Respiratory: Positive for cough, wheezing and use of rescue inhaler.    Genitourinary: Negative.    Endocrine: endocrine negative   Musculoskeletal: Negative.    Skin: Negative.    Gastrointestinal: Negative.    Neurological: Negative.        Objective:       Vitals:    06/30/17 1508   BP: 112/70   Pulse: 84   Resp: 18   SpO2: 96%   Weight: 72.6 kg (160 lb)   Height: 5' 4" (1.626 m)     Physical Exam   Constitutional: She is oriented to person, place, and time. She appears well-developed and well-nourished. She appears not cachectic. No distress.   HENT:   Head: Normocephalic.   Nose: Nose normal.   Mouth/Throat: Oropharynx is clear and moist. No oropharyngeal exudate.   Neck: Normal range of motion. Neck supple. No JVD present. No tracheal deviation present. No thyromegaly present.   Cardiovascular: Normal rate and normal heart sounds.    Pulmonary/Chest: Normal expansion, hyperinflation, symmetric chest wall expansion, effort normal and breath sounds normal.   Abdominal: Soft. " Bowel sounds are normal.   Musculoskeletal: Normal range of motion. She exhibits no tenderness.   Lymphadenopathy:     She has no cervical adenopathy.   Neurological: She is alert and oriented to person, place, and time. Gait normal.   Skin: Skin is warm and dry. Nails show no clubbing.   Psychiatric: She has a normal mood and affect.   Nursing note and vitals reviewed.    Personal Diagnostic Review   Pulmonary function tests: FEV1: 1.20  (46 % predicted), FVC:  2.83 (84 % predicted), FEV1/FVC:  42  15% BRD improvement with FEV1  FEV1 decreased by 17%  No flowsheet data found.        Chest CT  No evidence of pathologic lymphadenopathy in the chest.  Incidental findings as noted above.  Assessment:       Problem List Items Addressed This Visit     Tobacco dependence (Chronic)     Smoking cessation         Relevant Medications    fluticasone furoate (ARNUITY ELLIPTA) 100 mcg/actuation DsDv    umeclidinium-vilanterol 62.5-25 mcg/actuation DsDv    promethazine-codeine 6.25-10 mg/5 ml (PHENERGAN WITH CODEINE) 6.25-10 mg/5 mL syrup    Hypoxemia requiring supplemental oxygen     Night time oxygen         Relevant Medications    fluticasone furoate (ARNUITY ELLIPTA) 100 mcg/actuation DsDv    umeclidinium-vilanterol 62.5-25 mcg/actuation DsDv    promethazine-codeine 6.25-10 mg/5 ml (PHENERGAN WITH CODEINE) 6.25-10 mg/5 mL syrup    Stage 3 severe COPD by GOLD classification - Primary     CAT score 29  mRC score 1  Anoro ellipta,   FEV1 1.20 ( 46%), FEV1/FVC 42  Will Add Arnuity ellipta or eqivalent      Will discuss with pharmacy if BEVESPI is covered, may do better with glycopyrrolate for her chr bronchitis         Relevant Medications    fluticasone furoate (ARNUITY ELLIPTA) 100 mcg/actuation DsDv    umeclidinium-vilanterol 62.5-25 mcg/actuation DsDv    promethazine-codeine 6.25-10 mg/5 ml (PHENERGAN WITH CODEINE) 6.25-10 mg/5 mL syrup    Other Relevant Orders    X-Ray Chest PA And Lateral    Spirometry with/without  bronchodilator      Other Visit Diagnoses     COPD, moderate        Relevant Medications    fluticasone furoate (ARNUITY ELLIPTA) 100 mcg/actuation DsDv    umeclidinium-vilanterol 62.5-25 mcg/actuation DsDv    promethazine-codeine 6.25-10 mg/5 ml (PHENERGAN WITH CODEINE) 6.25-10 mg/5 mL syrup    Chronic wheezy bronchitis        Relevant Medications    fluticasone furoate (ARNUITY ELLIPTA) 100 mcg/actuation DsDv    umeclidinium-vilanterol 62.5-25 mcg/actuation DsDv    promethazine-codeine 6.25-10 mg/5 ml (PHENERGAN WITH CODEINE) 6.25-10 mg/5 mL syrup        Plan:           Return in about 4 months (around 10/30/2017) for Spirometry and cxr.    This note was prepared using voice recognition system and is likely to have sound alike errors that may have been overlooked even after proof reading.  Please call me with any questions    Discussed diagnosis, its evaluation, treatment and usual course. All questions answered.    Thank you for the courtesy of participating in the care of this patient    David Toscano MD    Patient prescribed a controlled substance. Printed and signed copy of prescription given to patient. Side effects reviewed in detail. Instructed not to combine with other controlled substances, alcohol or recreational drugs. Habit forming, addictive potential of drug discussed. Advised not to operate a vehicle while taking this medication. Policy of limited refills discussed

## 2017-06-30 NOTE — ASSESSMENT & PLAN NOTE
CAT score 29  mRC score 1  Anoro ellipta,   FEV1 1.20 ( 46%), FEV1/FVC 42  Will Add Arnuity ellipta or eqivalent      Will discuss with pharmacy if BEVESPI is covered, may do better with glycopyrrolate for her chr bronchitis

## 2017-07-06 ENCOUNTER — PATIENT MESSAGE (OUTPATIENT)
Dept: PULMONOLOGY | Facility: CLINIC | Age: 57
End: 2017-07-06

## 2017-07-13 ENCOUNTER — PATIENT MESSAGE (OUTPATIENT)
Dept: PULMONOLOGY | Facility: CLINIC | Age: 57
End: 2017-07-13

## 2017-07-28 ENCOUNTER — PATIENT MESSAGE (OUTPATIENT)
Dept: UROLOGY | Facility: CLINIC | Age: 57
End: 2017-07-28

## 2017-08-08 ENCOUNTER — PATIENT MESSAGE (OUTPATIENT)
Dept: UROLOGY | Facility: CLINIC | Age: 57
End: 2017-08-08

## 2017-09-12 ENCOUNTER — PATIENT MESSAGE (OUTPATIENT)
Dept: PULMONOLOGY | Facility: CLINIC | Age: 57
End: 2017-09-12

## 2017-09-12 DIAGNOSIS — J44.9 STAGE 3 SEVERE COPD BY GOLD CLASSIFICATION: Primary | ICD-10-CM

## 2017-09-15 DIAGNOSIS — F17.200 TOBACCO DEPENDENCE: Chronic | ICD-10-CM

## 2017-09-15 DIAGNOSIS — J42 CHRONIC WHEEZY BRONCHITIS: ICD-10-CM

## 2017-09-15 DIAGNOSIS — R09.02 HYPOXEMIA REQUIRING SUPPLEMENTAL OXYGEN: ICD-10-CM

## 2017-09-15 DIAGNOSIS — Z99.81 HYPOXEMIA REQUIRING SUPPLEMENTAL OXYGEN: ICD-10-CM

## 2017-09-15 DIAGNOSIS — J44.9 COPD, MODERATE: ICD-10-CM

## 2017-09-15 DIAGNOSIS — J44.9 STAGE 3 SEVERE COPD BY GOLD CLASSIFICATION: ICD-10-CM

## 2017-09-15 RX ORDER — PROMETHAZINE HYDROCHLORIDE AND CODEINE PHOSPHATE 6.25; 1 MG/5ML; MG/5ML
5 SOLUTION ORAL EVERY 4 HOURS PRN
Qty: 473 ML | Refills: 0 | Status: SHIPPED | OUTPATIENT
Start: 2017-09-15 | End: 2017-09-25

## 2017-09-16 ENCOUNTER — PATIENT MESSAGE (OUTPATIENT)
Dept: PULMONOLOGY | Facility: CLINIC | Age: 57
End: 2017-09-16

## 2017-09-18 ENCOUNTER — PATIENT MESSAGE (OUTPATIENT)
Dept: PULMONOLOGY | Facility: CLINIC | Age: 57
End: 2017-09-18

## 2017-09-26 ENCOUNTER — TELEPHONE (OUTPATIENT)
Dept: PULMONOLOGY | Facility: CLINIC | Age: 57
End: 2017-09-26

## 2017-09-26 NOTE — TELEPHONE ENCOUNTER
----- Message from Susie Whatley sent at 9/26/2017  2:24 PM CDT -----  Contact: pt  Please call pt @ [phone] regarding medication/cough surpy.

## 2017-10-10 ENCOUNTER — PATIENT MESSAGE (OUTPATIENT)
Dept: PULMONOLOGY | Facility: CLINIC | Age: 57
End: 2017-10-10

## 2017-11-03 ENCOUNTER — PATIENT MESSAGE (OUTPATIENT)
Dept: PULMONOLOGY | Facility: CLINIC | Age: 57
End: 2017-11-03

## 2017-11-17 ENCOUNTER — PROCEDURE VISIT (OUTPATIENT)
Dept: PULMONOLOGY | Facility: CLINIC | Age: 57
End: 2017-11-17
Payer: MEDICAID

## 2017-11-17 ENCOUNTER — HOSPITAL ENCOUNTER (OUTPATIENT)
Dept: RADIOLOGY | Facility: HOSPITAL | Age: 57
Discharge: HOME | End: 2017-11-17
Attending: INTERNAL MEDICINE
Payer: MEDICAID

## 2017-11-17 ENCOUNTER — OFFICE VISIT (OUTPATIENT)
Dept: PULMONOLOGY | Facility: CLINIC | Age: 57
End: 2017-11-17
Payer: MEDICAID

## 2017-11-17 VITALS
DIASTOLIC BLOOD PRESSURE: 78 MMHG | SYSTOLIC BLOOD PRESSURE: 116 MMHG | HEART RATE: 91 BPM | WEIGHT: 163 LBS | RESPIRATION RATE: 18 BRPM | HEIGHT: 64 IN | BODY MASS INDEX: 27.83 KG/M2 | OXYGEN SATURATION: 98 %

## 2017-11-17 DIAGNOSIS — J44.9 STAGE 3 SEVERE COPD BY GOLD CLASSIFICATION: ICD-10-CM

## 2017-11-17 DIAGNOSIS — R09.02 HYPOXEMIA REQUIRING SUPPLEMENTAL OXYGEN: ICD-10-CM

## 2017-11-17 DIAGNOSIS — F17.200 TOBACCO DEPENDENCE: Chronic | ICD-10-CM

## 2017-11-17 DIAGNOSIS — R05.9 COUGH: ICD-10-CM

## 2017-11-17 DIAGNOSIS — J44.9 STAGE 3 SEVERE COPD BY GOLD CLASSIFICATION: Primary | ICD-10-CM

## 2017-11-17 DIAGNOSIS — R05.3 CHRONIC COUGH: ICD-10-CM

## 2017-11-17 DIAGNOSIS — Z99.81 HYPOXEMIA REQUIRING SUPPLEMENTAL OXYGEN: ICD-10-CM

## 2017-11-17 LAB
PRE FEF 25 75: 0.25 L/S (ref 1.95–3.19)
PRE FET 100: 19.76 S
PRE FEV1 FVC: 34 %
PRE FEV1: 0.9 L (ref 2.41–3)
PRE FIF 50: 3.64 L/S
PRE FVC: 2.63 L (ref 3.11–3.8)
PRE PEF: 3.1 L/S (ref 5.71–7.43)
PREDICTED FEV1 FVC: 79.12 % (ref 74.22–84.02)
PREDICTED FEV1: 2.7 L (ref 2.41–3)
PREDICTED FVC: 3.45 L (ref 3.11–3.8)
PROVOCATION PROTOCOL: ABNORMAL

## 2017-11-17 PROCEDURE — 94010 BREATHING CAPACITY TEST: CPT | Mod: 26,S$PBB,, | Performed by: INTERNAL MEDICINE

## 2017-11-17 PROCEDURE — 71020 XR CHEST PA AND LATERAL: CPT | Mod: TC,PO

## 2017-11-17 PROCEDURE — 71020 XR CHEST PA AND LATERAL: CPT | Mod: 26,,, | Performed by: RADIOLOGY

## 2017-11-17 PROCEDURE — 99999 PR PBB SHADOW E&M-EST. PATIENT-LVL IV: CPT | Mod: PBBFAC,,, | Performed by: INTERNAL MEDICINE

## 2017-11-17 PROCEDURE — 94010 BREATHING CAPACITY TEST: CPT | Mod: PBBFAC,PO | Performed by: GENERAL PRACTICE

## 2017-11-17 PROCEDURE — 99214 OFFICE O/P EST MOD 30 MIN: CPT | Mod: 25,S$PBB,, | Performed by: INTERNAL MEDICINE

## 2017-11-17 PROCEDURE — 90686 IIV4 VACC NO PRSV 0.5 ML IM: CPT | Mod: PBBFAC,SL,PO

## 2017-11-17 PROCEDURE — 99214 OFFICE O/P EST MOD 30 MIN: CPT | Mod: PBBFAC,25,PO | Performed by: INTERNAL MEDICINE

## 2017-11-17 RX ORDER — PROMETHAZINE HYDROCHLORIDE AND DEXTROMETHORPHAN HYDROBROMIDE 6.25; 15 MG/5ML; MG/5ML
5 SYRUP ORAL
COMMUNITY
Start: 2017-10-05 | End: 2017-11-17 | Stop reason: SDUPTHER

## 2017-11-17 RX ORDER — AZITHROMYCIN 500 MG/1
500 TABLET, FILM COATED ORAL
Qty: 12 TABLET | Refills: 3 | Status: SHIPPED | OUTPATIENT
Start: 2017-11-17 | End: 2017-12-17

## 2017-11-17 RX ORDER — PROMETHAZINE HYDROCHLORIDE AND DEXTROMETHORPHAN HYDROBROMIDE 6.25; 15 MG/5ML; MG/5ML
5 SYRUP ORAL 3 TIMES DAILY
Qty: 473 ML | Refills: 1 | Status: SHIPPED | OUTPATIENT
Start: 2017-11-17 | End: 2017-11-17 | Stop reason: SDUPTHER

## 2017-11-17 RX ORDER — IPRATROPIUM BROMIDE AND ALBUTEROL SULFATE 2.5; .5 MG/3ML; MG/3ML
3 SOLUTION RESPIRATORY (INHALATION)
COMMUNITY
Start: 2017-04-09 | End: 2018-04-09

## 2017-11-17 RX ORDER — PROMETHAZINE HYDROCHLORIDE AND CODEINE PHOSPHATE 6.25; 1 MG/5ML; MG/5ML
5 SOLUTION ORAL EVERY 4 HOURS PRN
Qty: 473 ML | Refills: 1 | Status: SHIPPED | OUTPATIENT
Start: 2017-11-17 | End: 2017-11-17 | Stop reason: SDUPTHER

## 2017-11-17 RX ORDER — PROMETHAZINE HYDROCHLORIDE AND DEXTROMETHORPHAN HYDROBROMIDE 6.25; 15 MG/5ML; MG/5ML
5 SYRUP ORAL 3 TIMES DAILY
Qty: 473 ML | Refills: 1 | Status: SHIPPED | OUTPATIENT
Start: 2017-11-17 | End: 2017-11-17

## 2017-11-17 RX ORDER — METFORMIN HYDROCHLORIDE 500 MG/1
1000 TABLET, EXTENDED RELEASE ORAL
COMMUNITY
Start: 2017-10-18 | End: 2018-02-12

## 2017-11-17 RX ORDER — PROMETHAZINE HYDROCHLORIDE AND CODEINE PHOSPHATE 6.25; 1 MG/5ML; MG/5ML
5 SOLUTION ORAL EVERY 4 HOURS PRN
Qty: 473 ML | Refills: 1 | Status: SHIPPED | OUTPATIENT
Start: 2017-11-17 | End: 2017-11-27

## 2017-11-17 RX ORDER — CLONAZEPAM 1 MG/1
TABLET ORAL
COMMUNITY
Start: 2017-09-10 | End: 2018-02-12 | Stop reason: SDUPTHER

## 2017-11-17 NOTE — ASSESSMENT & PLAN NOTE
CAT score 15  mRC score 1  ANORO and albuterol  Chr Cough needing antitussives  FEV1 0.90( 33%), FEV1/FVC 34, FVC 2.63( 76%)  Will Add Qvar , insurance will not pay for Arnuity

## 2017-11-17 NOTE — PATIENT INSTRUCTIONS
Chronic Lung Disease: Preventing Lung Infections  Chronic lung diseases include chronic obstructive pulmonary disease (COPD), which includes chronic bronchitis and emphysema. Other chronic lung diseases include pulmonary fibrosis, sarcoidosis, and other conditions. When you have chronic lung diseases, it's very important to protect yourself from respiratory infections, like colds, the flu, and lung infections. Infections may cause your lung condition to worsen. Although you can't completely avoid them, there are things you can do to lessen the chance of infections.    Take precautions  Taking the following precautions can help you avoid illness:  · Remember to keep your hands away from your nose and mouth. Germs on your hands get into your respiratory system this way.  · Wash your hands often. When you wash them:  ¨ Use soap and warm water.  ¨ Rub your hands together well for at least 20 seconds.  ¨ Make sure to rinse them well.  ¨ Dry your hands on clean towels or air-dry them.  · Use hand  containing alcohol, if you are unable to wash your hands. Use the  after touching doorknobs, handles, and supermarket carts, for example, since lots of people touch them. Then wash your hands as soon as you can.  · To help prevent the flu, get a flu vaccination every year. This may be given at your healthcare provider's office, a drugstore, or pharmacy, or at work. Get your flu shot as soon as the vaccines are available in your area. This is usually around September each year.  · To help prevent pneumococcal pneumonia, get pneumonia vaccinations. Talk with your healthcare provider about which pneumococcal vaccinations you need.  · Try to stay away from people with respiratory infections, such as colds or the flu. Stay away from crowded places, like shopping centers or movie theatres during cold and flu season.  · If you smoke, think about quitting. In addition to causing or worsening many lung conditions, the  lung damage from smoking increases your risk of infections. Stay away from others who smoke, too. This is also harmful and increases your chance of infections.  Date Last Reviewed: 4/14/2016 © 2000-2017 [email or web address]. [address] [place] [address], [place] [address]. All rights reserved. This information is not intended as a substitute for professional medical care. Always follow your healthcare professional's instructions.        Beclomethasone respiratory inhalation aerosol  What is this medicine?  BECLOMETHASONE (be kloe METH a sone) is a corticosteroid. It helps decrease inflammation in your lungs. This medicine is used to treat the symptoms of asthma. Never use this medicine for an acute asthma attack.  How should I use this medicine?  This medicine is for inhalation through the mouth. Rinse your mouth with water after use. Make sure not to swallow the water. Follow the directions on your prescription label. This medicine works best if used regularly. Do not use more than directed. Do not stop taking except on your doctor's advice. Make sure that you are using your inhaler correctly. Ask you doctor or health care provider if you have any questions.  Talk to your pediatrician regarding the use of this medicine in children. While this drug may be prescribed for children as young as 5 years old for selected conditions, precautions do apply.  What side effects may I notice from receiving this medicine?  Side effects that you should report to your doctor or health care professional as soon as possible:  · allergic reactions like skin rash, itching or hives, swelling of the face, lips, or tongue  · changes in vision  · chest pain, tightness  · fever, infection  · trouble breathing, wheezing  · unusual swelling  · white patches or sores in the mouth or throat  Side effects that usually do not require medical attention (report to your doctor or health care professional if they continue or are  bothersome):  · burning, irritation in throat  · cough  · dry mouth  · headache  · unusual taste or smell  What may interact with this medicine?  Interactions are not expected.  What if I miss a dose?  If you miss a dose, use it as soon as you remember. If it is almost time for your next dose, use only that dose and continue with your regular schedule, spacing doses evenly. Do not use double or extra doses.  Where should I keep my medicine?  Keep out of the reach of children.  Store at room temperature between 15 and 30 degrees C (59 and 86 degrees F). The effect of this medicine is less when the canister is cold. Do not puncture the canister or throw on a fire or incinerator. Throw away any unused medicine after the expiration date.  What should I tell my health care provider before I take this medicine?  They need to know if you have any of these conditions:  · bone problems  · glaucoma  · immune system problems  · infection, like chickenpox, tuberculosis, herpes, or fungal infection  · recent surgery or injury of mouth or throat  · taking corticosteroids by mouth  · an unusual or allergic reaction to beclomethasone, other corticosteroids, other medicines, foods, dyes, or preservatives  · pregnant or trying to get pregnant  · breast-feeding  What should I watch for while using this medicine?  Visit your doctor or health care professional for regular check ups. Use this medicine regularly. Tell your doctor if your symptoms do not improve. Do not use extra medicine. If your asthma symptoms get worse while you are using this medicine, call your doctor right away.  Do not come in contact with people who have chickenpox or the measles while you are taking this medicine. If you do, call your doctor right away.  NOTE:This sheet is a summary. It may not cover all possible information. If you have questions about this medicine, talk to your doctor, pharmacist, or health care provider. Copyright© 2017 [employer]

## 2017-11-17 NOTE — PROGRESS NOTES
Subjective:      Sarah Monahan is a 57 y.o. female with known COPD who presents without exacerbation.   Current symptoms include: chronic dyspnea and cough productive of clear sputum in small amounts.   Symptoms began several months ago. Patient uses 2 pillows at night.   Patient currently is on oxygen at 2.0 L/min per nasal cannula. at night  Social has chronic bronchitis has required recurrent antitussive prescriptions  Spirometry today FEV1 is 0.90 (33% predicted)  We reviewed her medications  She will get her flu shot today  Other immunizations are up-to-date  COPD test score is 15  We discussed about multiple stress thousand and prone FOR her smoking which she is current on her currently working on  Her son is in shelter and that has been the concern  Refill for cough medication was provided by her paper prescription and we will also start her on Zithromax  and Friday to help stem her bronchitis      The following portions of the patient's history were reviewed and updated as appropriate:   She  has a past medical history of Acid reflux; Anemia; Asthma; Bronchitis chronic; Diabetes mellitus; DVT, lower extremity; Emphysema of lung; Hepatitis B; Hepatitis C; Herniated disc; Hyperlipidemia; Kidney stone; Lung disease; Oxygen dependent; Supraventricular tachycardia; and Urinary tract infection.  She  does not have any pertinent problems on file.  She  has a past surgical history that includes Cervical fusion; Cholecystectomy; Knee arthroscopy w/ ACL reconstruction; Hernia repair;  section, classic; Salpingectomy (Right); Cystoscopy w/ laser lithotripsy; abdominal laparotomy; orif knee (Right); and Breast surgery (Right).  Her family history includes Cancer in her maternal grandmother and mother; Diabetes in her father and paternal grandmother; Heart attack (age of onset: 60) in her father; Heart disease in her father; Heart failure in her paternal grandmother; Hypertension in her  father.  She  reports that she has been smoking.  She has a 40.00 pack-year smoking history. She has never used smokeless tobacco. She reports that she does not drink alcohol or use drugs.  She has a current medication list which includes the following prescription(s): albuterol, albuterol, albuterol-ipratropium 2.5mg-0.5mg/3ml, baclofen, blood sugar diagnostic, blood-glucose meter, clonazepam, clonazepam, docusate sodium, inhalation spacing device, metformin, promethazine-dextromethorphan, quetiapine, ranitidine, tramadol, umeclidinium-vilanterol, azithromycin, and beclomethasone.  Current Outpatient Prescriptions on File Prior to Visit   Medication Sig Dispense Refill    albuterol (ACCUNEB) 1.25 mg/3 mL Nebu Take 6 mLs (2.5 mg total) by nebulization 3 (three) times daily as needed. 270 vial 11    albuterol 90 mcg/actuation inhaler Inhale 2 puffs into the lungs every 4 to 6 hours as needed for Wheezing. 8.5 g 11    baclofen (LIORESAL) 10 MG tablet Take 10 mg by mouth nightly as needed.       blood sugar diagnostic Strp 1 each.      blood-glucose meter Misc Use to check blood sugar      clonazePAM (KLONOPIN) 1 MG tablet Take 1 mg by mouth every evening.       docusate sodium (COLACE) 100 MG capsule Take 1 capsule (100 mg total) by mouth 2 (two) times daily. 60 capsule 0    inhalation device (VORTEX HOLDING CHAMBER) Use as directed for inhalation. For use with albuterol inhaler. 1 Device prn    quetiapine (SEROQUEL) 50 MG tablet Take 50 mg by mouth nightly.       ranitidine (ZANTAC) 150 MG tablet Take 300 mg by mouth nightly.       tramadol (ULTRAM) 50 mg tablet TAKE ONE TABLET BY MOUTH EVERY 6 HOURS AS NEEDED FOR PAIN      umeclidinium-vilanterol 62.5-25 mcg/actuation DsDv Inhale 1 puff into the lungs once daily. 60 each 11    [DISCONTINUED] fluticasone furoate (ARNUITY ELLIPTA) 100 mcg/actuation DsDv Inhale 100 mcg into the lungs once daily. Controller 30 each 5    [DISCONTINUED] trazodone (DESYREL)  "150 MG tablet Take 150 mg by mouth nightly.        No current facility-administered medications on file prior to visit.      She is allergic to macrodantin [nitrofurantoin macrocrystalline]; nitrofurantoin; and nitrofurantoin monohyd/m-cryst..    Review of Systems  A comprehensive review of systems was negative except for: Respiratory: positive for cough  Behavioral/Psych: positive for anxiety       Objective:      /78   Pulse 91   Resp 18   Ht 5' 4" (1.626 m)   Wt 73.9 kg (163 lb)   SpO2 98%   BMI 27.98 kg/m²   FEV1: 0.90 L, 33 % of predicted  FEV1/FVC: 34 %     General appearance: alert, appears stated age, cooperative and no distress  Head: Normocephalic, without obvious abnormality, atraumatic  Eyes: negative findings: lids and lashes normal and conjunctivae and sclerae normal  Nose: no discharge, no sinus tenderness  Throat: lips, mucosa, and tongue normal; teeth and gums normal  Neck: no adenopathy, no carotid bruit, no JVD, supple, symmetrical, trachea midline and thyroid not enlarged, symmetric, no tenderness/mass/nodules  Lungs: diminished breath sounds bilaterally and wheezes scattered  Heart: regular rate and rhythm, S1, S2 normal, no murmur, click, rub or gallop  Abdomen: soft, non-tender; bowel sounds normal; no masses,  no organomegaly  Extremities: extremities normal, atraumatic, no cyanosis or edema  Pulses: 2+ and symmetric  Skin: Skin color, texture, turgor normal. No rashes or lesions  Lymph nodes: Cervical, supraclavicular, and axillary nodes normal.         Spirometry  Moderately severe obstructive defect  FEV1 0.90 (33% predicted) FVC 2.63 (76% predicted) FEV1/FVC was 34    CXR  Findings: Heart size and mediastinal contour are normal.    There is mild hyperexpansion of lungs and mild chronic changes at the lung bases.    No acute infiltrate, edema, or effusion.  Multilevel thoracic spondylosis and postsurgical changes cervical spine  Assessment:     Problem List Items Addressed This " Visit     Tobacco dependence (Chronic)     Assistance with smoking cessation was offered, including:  []  Medications  [x]  Counseling  []  Printed Information on Smoking Cessation  []  Referral to a Smoking Cessation Program    Patient was counseled regarding smoking for 3-10 minutes.             Relevant Orders    Ambulatory referral to Smoking Cessation Program    Hypoxemia requiring supplemental oxygen     Night time 2.0 LPM         Stage 3 severe COPD by GOLD classification - Primary     CAT score 15  mRC score 1  ANORO and albuterol  Chr Cough needing antitussives  FEV1 0.90( 33%), FEV1/FVC 34, FVC 2.63( 76%)  Will Add Qvar , insurance will not pay for Arnuity           Relevant Medications    promethazine-dextromethorphan (PROMETHAZINE-DM) 6.25-15 mg/5 mL Syrp    azithromycin (ZITHROMAX) 500 MG tablet    beclomethasone (QVAR) 80 mcg/actuation Aero    Other Relevant Orders    X-Ray Chest PA And Lateral    Spirometry without Bronchodilator      Other Visit Diagnoses     Chronic cough        Relevant Medications    promethazine-dextromethorphan (PROMETHAZINE-DM) 6.25-15 mg/5 mL Syrp    azithromycin (ZITHROMAX) 500 MG tablet    beclomethasone (QVAR) 80 mcg/actuation Aero         Plan:      Added Zithromax MWF  Added Qvar HFA    Return in about 3 months (around 2/17/2018) for nurse schedule flu shot schedule, Spirometry and CXR next visit.    This note was prepared using voice recognition system and is likely to have sound alike errors that may have been overlooked even after proof reading.  Please call me with any questions    Discussed diagnosis, its evaluation, treatment and usual course. All questions answered.    Thank you for the courtesy of participating in the care of this patient    David Toscano MD    Patient prescribed a controlled substance. Printed and signed copy of prescription given to patient.   Side effects reviewed in detail. Instructed not to combine with other controlled substances,  alcohol or recreational drugs.   Habit forming, addictive potential of drug discussed. Advised not to operate a vehicle while taking this medication. Policy of limited refills discussed

## 2017-12-04 ENCOUNTER — TELEPHONE (OUTPATIENT)
Dept: SMOKING CESSATION | Facility: CLINIC | Age: 57
End: 2017-12-04

## 2017-12-04 NOTE — TELEPHONE ENCOUNTER
Patient called about missed appointment. Pt wishes to reschedule but did request a call back on 12/5/2017.

## 2017-12-18 ENCOUNTER — PATIENT MESSAGE (OUTPATIENT)
Dept: PULMONOLOGY | Facility: CLINIC | Age: 57
End: 2017-12-18

## 2017-12-18 DIAGNOSIS — Z99.81 HYPOXEMIA REQUIRING SUPPLEMENTAL OXYGEN: ICD-10-CM

## 2017-12-18 DIAGNOSIS — F17.200 TOBACCO DEPENDENCE: Chronic | ICD-10-CM

## 2017-12-18 DIAGNOSIS — R09.02 HYPOXEMIA REQUIRING SUPPLEMENTAL OXYGEN: ICD-10-CM

## 2017-12-18 DIAGNOSIS — J42 CHRONIC WHEEZY BRONCHITIS: ICD-10-CM

## 2017-12-18 DIAGNOSIS — J44.9 COPD, MODERATE: ICD-10-CM

## 2017-12-18 DIAGNOSIS — J44.9 STAGE 3 SEVERE COPD BY GOLD CLASSIFICATION: ICD-10-CM

## 2018-01-10 ENCOUNTER — PATIENT MESSAGE (OUTPATIENT)
Dept: PULMONOLOGY | Facility: CLINIC | Age: 58
End: 2018-01-10

## 2018-01-17 ENCOUNTER — TELEPHONE (OUTPATIENT)
Dept: PULMONOLOGY | Facility: CLINIC | Age: 58
End: 2018-01-17

## 2018-01-17 DIAGNOSIS — R05.9 COUGH: ICD-10-CM

## 2018-01-17 DIAGNOSIS — J44.9 COPD, MODERATE: ICD-10-CM

## 2018-01-17 RX ORDER — PROMETHAZINE HYDROCHLORIDE AND CODEINE PHOSPHATE 6.25; 1 MG/5ML; MG/5ML
5 SOLUTION ORAL EVERY 4 HOURS PRN
Qty: 240 ML | Refills: 0 | Status: SHIPPED | OUTPATIENT
Start: 2018-01-17 | End: 2018-01-27

## 2018-01-17 RX ORDER — PROMETHAZINE HYDROCHLORIDE AND CODEINE PHOSPHATE 6.25; 1 MG/5ML; MG/5ML
5 SOLUTION ORAL EVERY 4 HOURS PRN
Qty: 240 ML | Refills: 0 | Status: SHIPPED | OUTPATIENT
Start: 2018-01-17 | End: 2018-01-17 | Stop reason: SDUPTHER

## 2018-01-17 RX ORDER — ALBUTEROL SULFATE 90 UG/1
2 AEROSOL, METERED RESPIRATORY (INHALATION) EVERY 6 HOURS PRN
Qty: 1 INHALER | Refills: 11 | Status: SHIPPED | OUTPATIENT
Start: 2018-01-17 | End: 2019-09-09 | Stop reason: SDUPTHER

## 2018-01-22 ENCOUNTER — PATIENT MESSAGE (OUTPATIENT)
Dept: PULMONOLOGY | Facility: CLINIC | Age: 58
End: 2018-01-22

## 2018-02-06 ENCOUNTER — PATIENT MESSAGE (OUTPATIENT)
Dept: PULMONOLOGY | Facility: CLINIC | Age: 58
End: 2018-02-06

## 2018-02-12 ENCOUNTER — PROCEDURE VISIT (OUTPATIENT)
Dept: PULMONOLOGY | Facility: CLINIC | Age: 58
End: 2018-02-12
Payer: MEDICAID

## 2018-02-12 ENCOUNTER — HOSPITAL ENCOUNTER (OUTPATIENT)
Dept: RADIOLOGY | Facility: HOSPITAL | Age: 58
Discharge: HOME | End: 2018-02-12
Attending: INTERNAL MEDICINE
Payer: MEDICAID

## 2018-02-12 ENCOUNTER — HOSPITAL ENCOUNTER (OUTPATIENT)
Dept: RADIOLOGY | Facility: HOSPITAL | Age: 58
Discharge: HOME | End: 2018-02-12
Attending: UROLOGY
Payer: MEDICAID

## 2018-02-12 ENCOUNTER — OFFICE VISIT (OUTPATIENT)
Dept: PULMONOLOGY | Facility: CLINIC | Age: 58
End: 2018-02-12
Payer: MEDICAID

## 2018-02-12 VITALS
WEIGHT: 161 LBS | RESPIRATION RATE: 18 BRPM | HEIGHT: 64 IN | HEART RATE: 99 BPM | BODY MASS INDEX: 27.49 KG/M2 | OXYGEN SATURATION: 95 %

## 2018-02-12 DIAGNOSIS — R05.9 COUGH: ICD-10-CM

## 2018-02-12 DIAGNOSIS — N20.1 RIGHT URETERAL STONE: ICD-10-CM

## 2018-02-12 DIAGNOSIS — R09.02 HYPOXEMIA REQUIRING SUPPLEMENTAL OXYGEN: ICD-10-CM

## 2018-02-12 DIAGNOSIS — J44.9 STAGE 3 SEVERE COPD BY GOLD CLASSIFICATION: ICD-10-CM

## 2018-02-12 DIAGNOSIS — J20.9 BRONCHITIS, CHRONIC OBSTRUCTIVE W ACUTE BRONCHITIS: Primary | ICD-10-CM

## 2018-02-12 DIAGNOSIS — N20.0 RENAL STONE: ICD-10-CM

## 2018-02-12 DIAGNOSIS — F17.200 TOBACCO DEPENDENCE: Chronic | ICD-10-CM

## 2018-02-12 DIAGNOSIS — M85.80 OSTEOPENIA, UNSPECIFIED LOCATION: ICD-10-CM

## 2018-02-12 DIAGNOSIS — Z99.81 HYPOXEMIA REQUIRING SUPPLEMENTAL OXYGEN: ICD-10-CM

## 2018-02-12 DIAGNOSIS — J44.0 BRONCHITIS, CHRONIC OBSTRUCTIVE W ACUTE BRONCHITIS: Primary | ICD-10-CM

## 2018-02-12 LAB
PRE FEF 25 75: 0.23 L/S (ref 1.95–3.19)
PRE FET 100: 16.46 S
PRE FEV1 FVC: 43 %
PRE FEV1: 0.98 L (ref 2.41–3)
PRE FIF 50: 2.9 L/S
PRE FVC: 2.28 L (ref 3.11–3.8)
PRE PEF: 2.82 L/S (ref 5.71–7.43)
PREDICTED FEV1 FVC: 79.12 % (ref 74.22–84.02)
PREDICTED FEV1: 2.7 L (ref 2.41–3)
PREDICTED FVC: 3.45 L (ref 3.11–3.8)
PROVOCATION PROTOCOL: ABNORMAL

## 2018-02-12 PROCEDURE — 99214 OFFICE O/P EST MOD 30 MIN: CPT | Mod: 25,S$PBB,, | Performed by: INTERNAL MEDICINE

## 2018-02-12 PROCEDURE — 99214 OFFICE O/P EST MOD 30 MIN: CPT | Mod: PBBFAC,25,PO | Performed by: INTERNAL MEDICINE

## 2018-02-12 PROCEDURE — 76770 US EXAM ABDO BACK WALL COMP: CPT | Mod: TC,PO

## 2018-02-12 PROCEDURE — 71046 X-RAY EXAM CHEST 2 VIEWS: CPT | Mod: TC,FY,PO

## 2018-02-12 PROCEDURE — 74018 RADEX ABDOMEN 1 VIEW: CPT | Mod: 26,,, | Performed by: RADIOLOGY

## 2018-02-12 PROCEDURE — 94010 BREATHING CAPACITY TEST: CPT | Mod: PBBFAC,PO

## 2018-02-12 PROCEDURE — 76770 US EXAM ABDO BACK WALL COMP: CPT | Mod: 26,,, | Performed by: RADIOLOGY

## 2018-02-12 PROCEDURE — 3008F BODY MASS INDEX DOCD: CPT | Mod: ,,, | Performed by: INTERNAL MEDICINE

## 2018-02-12 PROCEDURE — 94010 BREATHING CAPACITY TEST: CPT | Mod: 26,S$PBB,, | Performed by: INTERNAL MEDICINE

## 2018-02-12 PROCEDURE — 71046 X-RAY EXAM CHEST 2 VIEWS: CPT | Mod: 26,,, | Performed by: RADIOLOGY

## 2018-02-12 PROCEDURE — 74018 RADEX ABDOMEN 1 VIEW: CPT | Mod: TC,FY,PO

## 2018-02-12 PROCEDURE — 99999 PR PBB SHADOW E&M-EST. PATIENT-LVL IV: CPT | Mod: PBBFAC,,, | Performed by: INTERNAL MEDICINE

## 2018-02-12 RX ORDER — PROMETHAZINE HYDROCHLORIDE AND DEXTROMETHORPHAN HYDROBROMIDE 6.25; 15 MG/5ML; MG/5ML
SYRUP ORAL
COMMUNITY
Start: 2018-01-15 | End: 2018-02-12 | Stop reason: SDUPTHER

## 2018-02-12 RX ORDER — DOXYCYCLINE 100 MG/1
100 CAPSULE ORAL
COMMUNITY
Start: 2018-02-08 | End: 2018-02-28

## 2018-02-12 RX ORDER — PROMETHAZINE HYDROCHLORIDE AND DEXTROMETHORPHAN HYDROBROMIDE 6.25; 15 MG/5ML; MG/5ML
SYRUP ORAL
Qty: 240 ML | Refills: 0 | Status: SHIPPED | OUTPATIENT
Start: 2018-02-12 | End: 2018-02-12

## 2018-02-12 RX ORDER — PREDNISONE 20 MG/1
20 TABLET ORAL SEE ADMIN INSTRUCTIONS
Qty: 25 TABLET | Refills: 0 | Status: SHIPPED | OUTPATIENT
Start: 2018-02-12 | End: 2018-11-01 | Stop reason: SDUPTHER

## 2018-02-12 RX ORDER — AZITHROMYCIN 500 MG/1
TABLET, FILM COATED ORAL
Status: ON HOLD | COMMUNITY
Start: 2018-01-25 | End: 2019-01-26 | Stop reason: HOSPADM

## 2018-02-12 RX ORDER — PROMETHAZINE HYDROCHLORIDE AND CODEINE PHOSPHATE 6.25; 1 MG/5ML; MG/5ML
5 SOLUTION ORAL EVERY 4 HOURS PRN
Qty: 240 ML | Refills: 0 | Status: SHIPPED | OUTPATIENT
Start: 2018-02-12 | End: 2018-02-22

## 2018-02-12 RX ORDER — VARENICLINE TARTRATE 0.5 (11)-1
KIT ORAL
Qty: 1 PACKAGE | Refills: 0 | Status: SHIPPED | OUTPATIENT
Start: 2018-02-12 | End: 2018-12-19 | Stop reason: SDUPTHER

## 2018-02-12 NOTE — PROGRESS NOTES
Subjective:      Sarah Monahan is a 57 y.o. female with known COPD who presents with exacerbation.   Recently seen at [facility]  ER  Discharged on Doxocycline  Current symptoms include: chronic dyspnea and cough productive of clear sputum in small amounts.   Symptoms began several months ago. Patient uses 2 pillows at night.   Patient currently is on oxygen at 2.0 L/min per nasal cannula. at night  She also has chronic bronchitis has required recurrent antitussive prescriptions  Daliresp was not covered by insurance  Spirometry today FEV1 is 0.98 (36% predicted)  Medications: ANORO, QVAR, VENTOLIN HFA.  BP elevated 150/82, 142/78  Other immunizations are up-to-date  COPD test score is 33  COPD Group D : High risk More Symptoms  Pulmonary rehab offered,   We discussed about multiple stress  FOR her smoking which she is current on her currently working on  Follow up DEXA  Her son is in longterm and that has been the concern          The following portions of the patient's history were reviewed and updated as appropriate:   She  has a past medical history of Acid reflux; Anemia; Asthma; Bronchitis chronic; Diabetes mellitus; DVT, lower extremity; Emphysema of lung; Hepatitis B; Hepatitis C; Herniated disc; Hyperlipidemia; Kidney stone; Lung disease; Oxygen dependent; Supraventricular tachycardia; and Urinary tract infection.  She  does not have any pertinent problems on file.  She  has a past surgical history that includes Cervical fusion; Cholecystectomy; Knee arthroscopy w/ ACL reconstruction; Hernia repair;  section, classic; Salpingectomy (Right); Cystoscopy w/ laser lithotripsy; abdominal laparotomy; orif knee (Right); and Breast surgery (Right).  Her family history includes Cancer in her maternal grandmother and mother; Diabetes in her father and paternal grandmother; Heart attack (age of onset: 60) in her father; Heart disease in her father; Heart failure in her paternal grandmother;  Hypertension in her father.  She  reports that she has been smoking.  She has a 40.00 pack-year smoking history. She has never used smokeless tobacco. She reports that she does not drink alcohol or use drugs.  She has a current medication list which includes the following prescription(s): albuterol, albuterol, albuterol-ipratropium 2.5mg-0.5mg/3ml, azithromycin, baclofen, beclomethasone, blood sugar diagnostic, blood-glucose meter, clonazepam, docusate sodium, doxycycline, inhalation spacing device, ranitidine, tramadol, umeclidinium-vilanterol, prednisone, promethazine-codeine 6.25-10 mg/5 ml, and varenicline.  Current Outpatient Prescriptions on File Prior to Visit   Medication Sig Dispense Refill    albuterol (ACCUNEB) 1.25 mg/3 mL Nebu Take 6 mLs (2.5 mg total) by nebulization 3 (three) times daily as needed. 270 vial 11    albuterol (VENTOLIN HFA) 90 mcg/actuation inhaler Inhale 2 puffs into the lungs every 6 (six) hours as needed. 1 Inhaler 11    albuterol-ipratropium 2.5mg-0.5mg/3mL (DUO-NEB) 0.5 mg-3 mg(2.5 mg base)/3 mL nebulizer solution 3 mLs.      baclofen (LIORESAL) 10 MG tablet Take 10 mg by mouth nightly as needed.       beclomethasone (QVAR) 80 mcg/actuation Aero Inhale 2 puffs into the lungs 2 (two) times daily. Controller 120 each 11    blood sugar diagnostic Strp 1 each.      blood-glucose meter Misc Use to check blood sugar      clonazePAM (KLONOPIN) 1 MG tablet Take 1 mg by mouth every evening.       docusate sodium (COLACE) 100 MG capsule Take 1 capsule (100 mg total) by mouth 2 (two) times daily. 60 capsule 0    inhalation device (VORTEX HOLDING CHAMBER) Use as directed for inhalation. For use with albuterol inhaler. 1 Device prn    ranitidine (ZANTAC) 150 MG tablet Take 300 mg by mouth nightly.       tramadol (ULTRAM) 50 mg tablet TAKE ONE TABLET BY MOUTH EVERY 6 HOURS AS NEEDED FOR PAIN      umeclidinium-vilanterol 62.5-25 mcg/actuation DsDv Inhale 1 puff into the lungs once  "daily. 60 each 11    [DISCONTINUED] metFORMIN (GLUCOPHAGE-XR) 500 MG 24 hr tablet Take 1,000 mg by mouth.      [DISCONTINUED] quetiapine (SEROQUEL) 50 MG tablet Take 50 mg by mouth nightly.       [DISCONTINUED] clonazePAM (KLONOPIN) 1 MG tablet TAKE ONE TABLET BY MOUTH TWICE DAILY AS NEEDED FOR ANXIETY      [DISCONTINUED] promethazine-codeine 6.25-10 mg/5 ml (PHENERGAN WITH CODEINE) 6.25-10 mg/5 mL syrup Take 5 mLs by mouth every 4 (four) hours as needed for Cough. 240 mL 0     No current facility-administered medications on file prior to visit.      She is allergic to macrodantin [nitrofurantoin macrocrystalline]; nitrofurantoin; and nitrofurantoin monohyd/m-cryst..    Review of Systems  A comprehensive review of systems was negative except for: Respiratory: positive for cough  Behavioral/Psych: positive for anxiety       Objective:      Pulse 99   Resp 18   Ht 5' 4" (1.626 m)   Wt 73 kg (161 lb)   SpO2 95%   BMI 27.64 kg/m²       General appearance: alert, appears stated age, cooperative and no distress  Head: Normocephalic, without obvious abnormality, atraumatic  Eyes: negative findings: lids and lashes normal and conjunctivae and sclerae normal  Nose: no discharge, no sinus tenderness  Throat: lips, mucosa, and tongue normal; teeth and gums normal  Neck: no adenopathy, no carotid bruit, no JVD, supple, symmetrical, trachea midline and thyroid not enlarged, symmetric, no tenderness/mass/nodules  Lungs: diminished breath sounds bilaterally and wheezes scattered  Heart: regular rate and rhythm, S1, S2 normal, no murmur, click, rub or gallop  Abdomen: soft, non-tender; bowel sounds normal; no masses,  no organomegaly  Extremities: extremities normal, atraumatic, no cyanosis or edema  Pulses: 2+ and symmetric  Skin: Skin color, texture, turgor normal. No rashes or lesions  Lymph nodes: Cervical, supraclavicular, and axillary nodes normal.         Spirometry  Moderately severe obstructive defect  FEV1 0.98 " (36% predicted) FVC 2.28 (66% predicted) FEV1/FVC was 43    CXR  Findings: Lungs are slightly hyperexpanded with chronic changes at the lung base is again noted. Cardiomediastinal silhouette and osseous structures are unchanged with mild thoracic spondylosis noted. Presurgical changes of the cervical spine.   Impression      Stable chest without evidence of active intrathoracic disease.       Assessment:     Problem List Items Addressed This Visit     Tobacco dependence (Chronic)     Wants to try patches  Declined smoking classes         Relevant Medications    varenicline (CHANTIX STARTING MONTH BOX) 0.5 mg (11)- 1 mg (42) tablet    Hypoxemia requiring supplemental oxygen     Has night time oxygen 2.0 LPM         Stage 3 severe COPD by GOLD classification     CAT score 33  mRC score 2  Recently was in ER at [facility]  Discharged on Doxycline  ANORO and albuterol  Chr Cough needing antitussives  FEV1 0.98 36%), FEV1/FVC 43, FVC 2.28( 66%)  Has been taking QVAR once daily    Group D: High risk More symptoms  Added Chantix started kit         Relevant Orders    X-Ray Chest PA And Lateral    Spirometry with/without bronchodilator    DXA Bone Density Appendicular Skeleton    Osteopenia    Relevant Orders    DXA Bone Density Appendicular Skeleton      Other Visit Diagnoses     Bronchitis, chronic obstructive w acute bronchitis    -  Primary    [facility] has Script for Doxycycline  Check Procalcitonin  Limited refills on Cough medication    Relevant Medications    predniSONE (DELTASONE) 20 MG tablet    Other Relevant Orders    Procalcitonin    X-Ray Chest PA And Lateral    Spirometry with/without bronchodilator    Cough        refill request.     Relevant Medications    promethazine-codeine 6.25-10 mg/5 ml (PHENERGAN WITH CODEINE) 6.25-10 mg/5 mL syrup         Plan:      PO Doxycycline and Prednisone taper  Qvar HFA 2 puff BID  Group D : consider Pul rehab    Follow-up in about 4 months (around 6/12/2018) for  Spirometry, Smoking cessation counselling and referal, Practice Breathing Techniques, DEXA, Labs today.    This note was prepared using voice recognition system and is likely to have sound alike errors that may have been overlooked even after proof reading.  Please call me with any questions    Discussed diagnosis, its evaluation, treatment and usual course. All questions answered.    Thank you for the courtesy of participating in the care of this patient    David Toscano MD    Patient prescribed a controlled substance. Printed and signed copy of prescription given to patient.   Side effects reviewed in detail. Instructed not to combine with other controlled substances, alcohol or recreational drugs.   Habit forming, addictive potential of drug discussed. Advised not to operate a vehicle while taking this medication. Policy of limited refills discussed

## 2018-02-12 NOTE — ASSESSMENT & PLAN NOTE
CAT score 33  mRC score 2  Recently was in ER at [facility]  Discharged on Doxycline  ANORO and albuterol  Chr Cough needing antitussives  FEV1 0.98 36%), FEV1/FVC 43, FVC 2.28( 66%)  Has been taking QVAR once daily    Group D: High risk More symptoms  Added Chantix started kit

## 2018-03-22 DIAGNOSIS — J44.0 BRONCHITIS, CHRONIC OBSTRUCTIVE W ACUTE BRONCHITIS: ICD-10-CM

## 2018-03-22 DIAGNOSIS — J20.9 BRONCHITIS, CHRONIC OBSTRUCTIVE W ACUTE BRONCHITIS: ICD-10-CM

## 2018-03-22 RX ORDER — PROMETHAZINE HYDROCHLORIDE AND DEXTROMETHORPHAN HYDROBROMIDE 6.25; 15 MG/5ML; MG/5ML
SYRUP ORAL
Qty: 240 ML | Refills: 0 | Status: SHIPPED | OUTPATIENT
Start: 2018-03-22 | End: 2018-11-05 | Stop reason: SDUPTHER

## 2018-05-18 NOTE — PROCEDURES
ABG resulted.    REPORT    Normal GAS exchange  A a gradient was 28.5 ( expected 17.5)    David Toscano MD    
Abdominal Pain, N/V/D

## 2018-05-28 ENCOUNTER — TELEPHONE (OUTPATIENT)
Dept: PULMONOLOGY | Facility: CLINIC | Age: 58
End: 2018-05-28

## 2018-06-08 ENCOUNTER — PATIENT MESSAGE (OUTPATIENT)
Dept: PULMONOLOGY | Facility: CLINIC | Age: 58
End: 2018-06-08

## 2018-06-08 ENCOUNTER — TELEPHONE (OUTPATIENT)
Dept: PULMONOLOGY | Facility: CLINIC | Age: 58
End: 2018-06-08

## 2018-06-08 NOTE — TELEPHONE ENCOUNTER
----- Message from Maribel Morton sent at 6/8/2018  1:40 PM CDT -----  Contact: Pt   .PT is returning nurse call, request call back .[phone] ([phone])

## 2018-06-19 ENCOUNTER — PATIENT MESSAGE (OUTPATIENT)
Dept: PULMONOLOGY | Facility: CLINIC | Age: 58
End: 2018-06-19

## 2018-06-26 DIAGNOSIS — J44.9 STAGE 3 SEVERE COPD BY GOLD CLASSIFICATION: ICD-10-CM

## 2018-06-26 DIAGNOSIS — R05.3 CHRONIC COUGH: ICD-10-CM

## 2018-06-26 RX ORDER — PROMETHAZINE HYDROCHLORIDE AND DEXTROMETHORPHAN HYDROBROMIDE 6.25; 15 MG/5ML; MG/5ML
SYRUP ORAL
Refills: 1 | OUTPATIENT
Start: 2018-06-26

## 2018-07-20 DIAGNOSIS — J44.9 STAGE 3 SEVERE COPD BY GOLD CLASSIFICATION: ICD-10-CM

## 2018-07-20 DIAGNOSIS — F17.200 TOBACCO DEPENDENCE: Chronic | ICD-10-CM

## 2018-07-20 DIAGNOSIS — J44.9 COPD, MODERATE: ICD-10-CM

## 2018-07-20 DIAGNOSIS — R09.02 HYPOXEMIA REQUIRING SUPPLEMENTAL OXYGEN: ICD-10-CM

## 2018-07-20 DIAGNOSIS — J42 CHRONIC WHEEZY BRONCHITIS: ICD-10-CM

## 2018-07-20 DIAGNOSIS — Z99.81 HYPOXEMIA REQUIRING SUPPLEMENTAL OXYGEN: ICD-10-CM

## 2018-07-24 ENCOUNTER — TELEPHONE (OUTPATIENT)
Dept: PHARMACY | Facility: CLINIC | Age: 58
End: 2018-07-24

## 2018-07-24 NOTE — TELEPHONE ENCOUNTER
Reason for call:    Notify patient PA for Anoro Inhaler has been submitted to her insurance on 7/24/18 @ 3:27pm and can take up to 72 hours.    Will continue to reach out to patient.    Will notify patient and provider once PA approved or denied.    Thanks,   Cristin Harden  Patient Care Advocate   [facility]  Phone: [phone]  Fax: [phone]

## 2018-07-25 ENCOUNTER — TELEPHONE (OUTPATIENT)
Dept: PHARMACY | Facility: CLINIC | Age: 58
End: 2018-07-25

## 2018-07-25 DIAGNOSIS — J44.9 STAGE 3 SEVERE COPD BY GOLD CLASSIFICATION: Primary | ICD-10-CM

## 2018-07-25 NOTE — TELEPHONE ENCOUNTER
Called and spoke with patient.    Explained I was trying to get her Anoro Ellipta approved on her insurance, however they want her to try and fail a medication called Stiolto.      Patient said she has never taken Stiolto.  She said she has only taken, Spiriva, Advair, Arnuity, Incruse, Anoro, Qvar and Ventolin HFA.    I told her I would pass the message along to Dr. Toscano.    Patient was thankful for the update and information.    Thanks,   Cristin Harden  Patient Care Advocate   [facility]  Phone: [phone]  Fax: [phone]

## 2018-08-20 ENCOUNTER — PATIENT MESSAGE (OUTPATIENT)
Dept: PULMONOLOGY | Facility: CLINIC | Age: 58
End: 2018-08-20

## 2018-08-20 DIAGNOSIS — J20.9 BRONCHITIS, CHRONIC OBSTRUCTIVE W ACUTE BRONCHITIS: ICD-10-CM

## 2018-08-20 DIAGNOSIS — J44.0 BRONCHITIS, CHRONIC OBSTRUCTIVE W ACUTE BRONCHITIS: ICD-10-CM

## 2018-08-20 RX ORDER — PROMETHAZINE HYDROCHLORIDE AND DEXTROMETHORPHAN HYDROBROMIDE 6.25; 15 MG/5ML; MG/5ML
SYRUP ORAL
Qty: 240 ML | Refills: 0 | OUTPATIENT
Start: 2018-08-20

## 2018-08-27 ENCOUNTER — HOSPITAL ENCOUNTER (OUTPATIENT)
Dept: RADIOLOGY | Facility: HOSPITAL | Age: 58
Discharge: HOME | End: 2018-08-27
Attending: INTERNAL MEDICINE
Payer: MEDICAID

## 2018-08-27 ENCOUNTER — OFFICE VISIT (OUTPATIENT)
Dept: PULMONOLOGY | Facility: CLINIC | Age: 58
End: 2018-08-27
Payer: MEDICAID

## 2018-08-27 ENCOUNTER — PROCEDURE VISIT (OUTPATIENT)
Dept: PULMONOLOGY | Facility: CLINIC | Age: 58
End: 2018-08-27
Payer: MEDICAID

## 2018-08-27 VITALS
WEIGHT: 161 LBS | HEIGHT: 64 IN | OXYGEN SATURATION: 95 % | DIASTOLIC BLOOD PRESSURE: 80 MMHG | BODY MASS INDEX: 27.49 KG/M2 | SYSTOLIC BLOOD PRESSURE: 120 MMHG | RESPIRATION RATE: 17 BRPM | HEART RATE: 84 BPM

## 2018-08-27 DIAGNOSIS — J44.0 BRONCHITIS, CHRONIC OBSTRUCTIVE W ACUTE BRONCHITIS: ICD-10-CM

## 2018-08-27 DIAGNOSIS — J20.9 BRONCHITIS, CHRONIC OBSTRUCTIVE W ACUTE BRONCHITIS: ICD-10-CM

## 2018-08-27 DIAGNOSIS — J44.9 STAGE 3 SEVERE COPD BY GOLD CLASSIFICATION: ICD-10-CM

## 2018-08-27 DIAGNOSIS — Z99.81 HYPOXEMIA REQUIRING SUPPLEMENTAL OXYGEN: ICD-10-CM

## 2018-08-27 DIAGNOSIS — J44.9 STAGE 3 SEVERE COPD BY GOLD CLASSIFICATION: Primary | ICD-10-CM

## 2018-08-27 DIAGNOSIS — F17.200 TOBACCO DEPENDENCE: Chronic | ICD-10-CM

## 2018-08-27 DIAGNOSIS — R09.02 HYPOXEMIA REQUIRING SUPPLEMENTAL OXYGEN: ICD-10-CM

## 2018-08-27 DIAGNOSIS — R05.3 COUGH, PERSISTENT: ICD-10-CM

## 2018-08-27 DIAGNOSIS — Z12.9 SCREENING FOR CANCER: ICD-10-CM

## 2018-08-27 LAB
BRPFT: ABNORMAL
FEF 25 75 LLN: 1.29
FEF 25 75 PRE REF: 9.6 %
FEF 25 75 PRE: 0.23 L/S (ref 1.29–3.93)
FEF 25 75 REF: 2.43
FET100 PRE: 13.76 SEC
FEV1 FVC LLN: 68
FEV1 FVC PRE REF: 40.5 %
FEV1 FVC PRE: 32.26 % (ref 68.11–89.64)
FEV1 FVC REF: 80
FEV1 LLN: 1.99
FEV1 PRE REF: 33 %
FEV1 PRE: 0.86 L (ref 1.99–3.18)
FEV1 REF: 2.6
FEV6 LLN: 2.62
FEV6 PRE REF: 59.1 %
FEV6 PRE: 1.95 L (ref 2.62–3.98)
FEV6 REF: 3.3
FVC LLN: 2.51
FVC PRE REF: 81.1 %
FVC PRE: 2.66 L (ref 2.51–4.07)
FVC REF: 3.28
MVV LLN: 82
MVV REF: 97
PEF LLN: 4.78
PEF PRE REF: 33.1 %
PEF PRE: 2.15 L/S (ref 4.78–8.21)
PEF REF: 6.49

## 2018-08-27 PROCEDURE — 99214 OFFICE O/P EST MOD 30 MIN: CPT | Mod: 25,S$PBB,, | Performed by: INTERNAL MEDICINE

## 2018-08-27 PROCEDURE — 94010 BREATHING CAPACITY TEST: CPT | Mod: 26,S$PBB,, | Performed by: INTERNAL MEDICINE

## 2018-08-27 PROCEDURE — 99213 OFFICE O/P EST LOW 20 MIN: CPT | Mod: PBBFAC,25,PO | Performed by: INTERNAL MEDICINE

## 2018-08-27 PROCEDURE — 99999 PR PBB SHADOW E&M-EST. PATIENT-LVL III: CPT | Mod: PBBFAC,,, | Performed by: INTERNAL MEDICINE

## 2018-08-27 PROCEDURE — 71046 X-RAY EXAM CHEST 2 VIEWS: CPT | Mod: TC,FY,PO

## 2018-08-27 PROCEDURE — 94010 BREATHING CAPACITY TEST: CPT | Mod: PBBFAC,PO

## 2018-08-27 PROCEDURE — 71046 X-RAY EXAM CHEST 2 VIEWS: CPT | Mod: 26,,, | Performed by: RADIOLOGY

## 2018-08-27 RX ORDER — PROMETHAZINE HYDROCHLORIDE AND CODEINE PHOSPHATE 6.25; 1 MG/5ML; MG/5ML
5 SOLUTION ORAL EVERY 4 HOURS PRN
Qty: 240 ML | Refills: 0 | Status: SHIPPED | OUTPATIENT
Start: 2018-08-27 | End: 2018-09-14

## 2018-08-27 RX ORDER — PROMETHAZINE HYDROCHLORIDE AND CODEINE PHOSPHATE 6.25; 1 MG/5ML; MG/5ML
5 SOLUTION ORAL EVERY 4 HOURS PRN
COMMUNITY
End: 2018-08-27 | Stop reason: SDUPTHER

## 2018-08-27 NOTE — ASSESSMENT & PLAN NOTE
CAT score 32  mRC score 2  Stiolto and Qvar and albuterol  Chr Cough needing antitussives  FEV1 0.86 (33%)  Has been taking QVAR once daily     Group D: High risk More symptoms

## 2018-08-27 NOTE — PROGRESS NOTES
Subjective:      Sarah Monahan is a 58 y.o. female with known COPD who presents with exacerbation.   Last visit 2018, Has remained symptom free overall.  Here from meds refill and review for meds: STIOLTO respimat  Current symptoms include: chronic dyspnea and cough productive of clear sputum in small amounts.   Symptoms began several months ago. Patient uses 2 pillows at night.   Patient currently is on oxygen at 2.0 L/min per nasal cannula. at night  She also has chronic bronchitis has required recurrent antitussive prescriptions  Daliresp still  not covered by insurance  Spirometry today FEV1 is 0.86 (33% predicted)  Medications: STIOLTO, QVAR, VENTOLIN HFA.  Other immunizations are up-to-date  COPD test score is 32  COPD Group D : High risk More Symptoms  Pulmonary rehab offered,   We discussed about multiple stress  FOR her smoking which she is current on her currently working on  Follow up DEXA  Her son is in [facility] and that has been the concern          The following portions of the patient's history were reviewed and updated as appropriate:   She  has a past medical history of Acid reflux, Anemia, Asthma, Bronchitis chronic, Diabetes mellitus, DVT, lower extremity, Emphysema of lung, Hepatitis B, Hepatitis C, Herniated disc, Hyperlipidemia, Kidney stone, Lung disease, Oxygen dependent, Supraventricular tachycardia, and Urinary tract infection.  She does not have any pertinent problems on file.  She  has a past surgical history that includes Cervical fusion; Cholecystectomy; Knee arthroscopy w/ ACL reconstruction; Hernia repair;  section, classic; Salpingectomy (Right); Cystoscopy w/ laser lithotripsy; abdominal laparotomy; orif knee (Right); Breast surgery (Right); LITHOTRIPSY-LASER (Right, 2017); CYSTOSCOPY WITH STENT REMOVAL (Right, 2017); PYELOGRAM-RETROGRADE (Right, 2017); CYSTOSCOPY WITH STENT PLACEMENT (Right, 2017); EXTRACTION-STONE-URETEROSCOPY (Right,  4/18/2017); LITHOTRIPSY-EXTRACORPOREAL SHOCK WAVE (Right, 4/4/2017); ENDOBRONCHIAL ULTRASOUND (EBUS) (N/A, 6/25/2014); and BRONCHOSCOPY (N/A, 6/25/2014).  Her family history includes Cancer in her maternal grandmother and mother; Diabetes in her father and paternal grandmother; Heart attack (age of onset: 60) in her father; Heart disease in her father; Heart failure in her paternal grandmother; Hypertension in her father.  She  reports that she has been smoking.  She has a 40.00 pack-year smoking history. she has never used smokeless tobacco. She reports that she does not drink alcohol or use drugs.  She has a current medication list which includes the following prescription(s): albuterol, azithromycin, baclofen, beclomethasone, blood sugar diagnostic, clonazepam, inhalation spacing device, promethazine-codeine 6.25-10 mg/5 ml, promethazine-dextromethorphan, ranitidine, tiotropium-olodaterol, tramadol, blood-glucose meter, docusate sodium, prednisone, and varenicline.  Current Outpatient Medications on File Prior to Visit   Medication Sig Dispense Refill    albuterol (VENTOLIN HFA) 90 mcg/actuation inhaler Inhale 2 puffs into the lungs every 6 (six) hours as needed. 1 Inhaler 11    azithromycin (ZITHROMAX) 500 MG tablet       baclofen (LIORESAL) 10 MG tablet Take 10 mg by mouth nightly as needed.       beclomethasone (QVAR) 80 mcg/actuation Aero Inhale 2 puffs into the lungs 2 (two) times daily. Controller 120 each 11    blood sugar diagnostic Strp 1 each.      clonazePAM (KLONOPIN) 1 MG tablet Take 1 mg by mouth every evening.       inhalation device (VORTEX HOLDING CHAMBER) Use as directed for inhalation. For use with albuterol inhaler. 1 Device prn    promethazine-dextromethorphan (PROMETHAZINE-DM) 6.25-15 mg/5 mL Syrp TAKE 5 MLS BY MOUTH FOUR TIMES A DAY AS NEEDED FOR COUGH 240 mL 0    ranitidine (ZANTAC) 150 MG tablet Take 300 mg by mouth nightly.       tiotropium-olodaterol (STIOLTO RESPIMAT) 2.5-2.5  "mcg/actuation Mist Inhale 2 puffs into the lungs once daily. 4 g 11    tramadol (ULTRAM) 50 mg tablet TAKE ONE TABLET BY MOUTH EVERY 6 HOURS AS NEEDED FOR PAIN      blood-glucose meter Misc Use to check blood sugar      docusate sodium (COLACE) 100 MG capsule Take 1 capsule (100 mg total) by mouth 2 (two) times daily. 60 capsule 0    predniSONE (DELTASONE) 20 MG tablet Take 1 tablet (20 mg total) by mouth As instructed. 3tab po daily x3days,2tabs po daily x3days,1tab po daily x3days,1/2tab po daily x3days 25 tablet 0    varenicline (CHANTIX STARTING MONTH BOX) 0.5 mg (11)- 1 mg (42) tablet Take one 0.5mg tab by mouth once daily X3 days,then increase to one 0.5mg tab twice daily X4 days,then increase to one 1mg tab twice daily 1 Package 0     No current facility-administered medications on file prior to visit.      She is allergic to macrodantin [nitrofurantoin macrocrystalline]; nitrofurantoin; and nitrofurantoin monohyd/m-cryst..    Review of Systems  A comprehensive review of systems was negative except for: Respiratory: positive for cough  Behavioral/Psych: positive for anxiety       Objective:      /80   Pulse 84   Resp 17   Ht 5' 4" (1.626 m)   Wt 73 kg (161 lb)   SpO2 95%   BMI 27.64 kg/m²     General appearance: alert, appears stated age, cooperative and no distress  Head: Normocephalic, without obvious abnormality, atraumatic  Eyes: negative findings: lids and lashes normal and conjunctivae and sclerae normal  Nose: no discharge, no sinus tenderness  Throat: lips, mucosa, and tongue normal; teeth and gums normal  Neck: no adenopathy, no carotid bruit, no JVD, supple, symmetrical, trachea midline and thyroid not enlarged, symmetric, no tenderness/mass/nodules  Lungs: diminished breath sounds bilaterally and wheezes scattered  Heart: regular rate and rhythm, S1, S2 normal, no murmur, click, rub or gallop  Abdomen: soft, non-tender; bowel sounds normal; no masses,  no organomegaly  Extremities: " extremities normal, atraumatic, no cyanosis or edema  Pulses: 2+ and symmetric  Skin: Skin color, texture, turgor normal. No rashes or lesions  Lymph nodes: Cervical, supraclavicular, and axillary nodes normal.         Spirometry  Moderately severe obstructive defect  FEV1 0.86 (33% predicted) FVC 2.66 (81.1% predicted) FEV1/FVC was 32      CXR   FINDINGS:  The lungs are clear and free of infiltrate.  No pleural effusion or pneumothorax. The heart is not enlarged. Chronic increased peribronchial markings noted throughout the chest.  The lungs are hyperexpanded.  There is a linear opacity in the region the right lung base which may be representative of some atelectasis.      Assessment:     Problem List Items Addressed This Visit     Tobacco dependence (Chronic)     Assistance with smoking cessation was offered, including:  []  Medications  [x]  Counseling  []  Printed Information on Smoking Cessation  []  Referral to a Smoking Cessation Program    Patient was counseled regarding smoking for 3-10 minutes.             Relevant Orders    CT Chest Lung Screening Low Dose    Hypoxemia requiring supplemental oxygen    Stage 3 severe COPD by GOLD classification - Primary     CAT score 32  mRC score 2  Stiolto and Qvar and albuterol  Chr Cough needing antitussives  FEV1 0.86 (33%)  Has been taking QVAR once daily     Group D: High risk More symptoms            Relevant Orders    X-Ray Chest PA And Lateral    Spirometry with/without bronchodilator    CT Chest Lung Screening Low Dose      Other Visit Diagnoses     Screening for cancer        Relevant Orders    CT Chest Lung Screening Low Dose    Cough, persistent        Relevant Medications    promethazine-codeine 6.25-10 mg/5 ml (PHENERGAN WITH CODEINE) 6.25-10 mg/5 mL syrup         Plan:       LDCT ordered  Qvar HFA 2 puff BID: proper technique discussed  Group D : consider Pul rehab  FEV1  Meets transplant ref criteria, Active smoker    Follow-up in about 3 months (around  11/27/2018), or Would be elligible for transplant eval except for active smoker, for Spirometry and CXR next visit, Low dose screening Chest CT lung cancer screening, Smoking cessation .    This note was prepared using voice recognition system and is likely to have sound alike errors that may have been overlooked even after proof reading.  Please call me with any questions    Discussed diagnosis, its evaluation, treatment and usual course. All questions answered.    Thank you for the courtesy of participating in the care of this patient    David Toscano MD    Patient prescribed a controlled substance. Printed and signed copy of prescription given to patient.   Side effects reviewed in detail. Instructed not to combine with other controlled substances, alcohol or recreational drugs.   Habit forming, addictive potential of drug discussed. Advised not to operate a vehicle while taking this medication. Policy of limited refills discussed

## 2018-08-29 ENCOUNTER — PATIENT MESSAGE (OUTPATIENT)
Dept: PULMONOLOGY | Facility: CLINIC | Age: 58
End: 2018-08-29

## 2018-09-04 ENCOUNTER — TELEPHONE (OUTPATIENT)
Dept: RADIOLOGY | Facility: HOSPITAL | Age: 58
End: 2018-09-04

## 2018-09-04 ENCOUNTER — PATIENT MESSAGE (OUTPATIENT)
Dept: PULMONOLOGY | Facility: CLINIC | Age: 58
End: 2018-09-04

## 2018-09-05 ENCOUNTER — HOSPITAL ENCOUNTER (OUTPATIENT)
Dept: RADIOLOGY | Facility: HOSPITAL | Age: 58
Discharge: HOME | End: 2018-09-05
Attending: INTERNAL MEDICINE

## 2018-09-05 DIAGNOSIS — F17.200 TOBACCO DEPENDENCE: Primary | Chronic | ICD-10-CM

## 2018-09-05 DIAGNOSIS — Z12.9 SCREENING FOR CANCER: ICD-10-CM

## 2018-09-05 DIAGNOSIS — J44.9 STAGE 3 SEVERE COPD BY GOLD CLASSIFICATION: ICD-10-CM

## 2018-09-05 DIAGNOSIS — F17.200 TOBACCO DEPENDENCE: Chronic | ICD-10-CM

## 2018-09-05 PROCEDURE — G0297 LDCT FOR LUNG CA SCREEN: HCPCS | Mod: TC,PO

## 2018-09-14 DIAGNOSIS — R05.3 COUGH, PERSISTENT: ICD-10-CM

## 2018-09-14 RX ORDER — PROMETHAZINE HYDROCHLORIDE AND CODEINE PHOSPHATE 6.25; 1 MG/5ML; MG/5ML
5 SOLUTION ORAL EVERY 4 HOURS PRN
Qty: 240 ML | Refills: 0 | Status: SHIPPED | OUTPATIENT
Start: 2018-09-14 | End: 2018-12-10 | Stop reason: SDUPTHER

## 2018-11-01 ENCOUNTER — PATIENT MESSAGE (OUTPATIENT)
Dept: PULMONOLOGY | Facility: CLINIC | Age: 58
End: 2018-11-01

## 2018-11-01 DIAGNOSIS — R05.3 COUGH, PERSISTENT: ICD-10-CM

## 2018-11-01 DIAGNOSIS — J20.9 BRONCHITIS, CHRONIC OBSTRUCTIVE W ACUTE BRONCHITIS: ICD-10-CM

## 2018-11-01 DIAGNOSIS — J44.0 BRONCHITIS, CHRONIC OBSTRUCTIVE W ACUTE BRONCHITIS: ICD-10-CM

## 2018-11-01 RX ORDER — PREDNISONE 20 MG/1
20 TABLET ORAL SEE ADMIN INSTRUCTIONS
Qty: 25 TABLET | Refills: 0 | Status: ON HOLD | OUTPATIENT
Start: 2018-11-01 | End: 2019-01-26 | Stop reason: HOSPADM

## 2018-11-01 RX ORDER — PROMETHAZINE HYDROCHLORIDE AND CODEINE PHOSPHATE 6.25; 1 MG/5ML; MG/5ML
5 SOLUTION ORAL EVERY 4 HOURS PRN
Qty: 240 ML | Refills: 0 | OUTPATIENT
Start: 2018-11-01

## 2018-11-01 RX ORDER — PROMETHAZINE HYDROCHLORIDE AND CODEINE PHOSPHATE 6.25; 1 MG/5ML; MG/5ML
5 SOLUTION ORAL EVERY 4 HOURS PRN
Qty: 240 ML | Refills: 0 | Status: CANCELLED | OUTPATIENT
Start: 2018-11-01

## 2018-11-05 DIAGNOSIS — J44.0 BRONCHITIS, CHRONIC OBSTRUCTIVE W ACUTE BRONCHITIS: ICD-10-CM

## 2018-11-05 DIAGNOSIS — J20.9 BRONCHITIS, CHRONIC OBSTRUCTIVE W ACUTE BRONCHITIS: ICD-10-CM

## 2018-11-05 RX ORDER — PROMETHAZINE HYDROCHLORIDE AND DEXTROMETHORPHAN HYDROBROMIDE 6.25; 15 MG/5ML; MG/5ML
SYRUP ORAL
Qty: 240 ML | Refills: 0 | Status: ON HOLD | OUTPATIENT
Start: 2018-11-05 | End: 2019-01-26 | Stop reason: HOSPADM

## 2018-11-18 RX ORDER — BECLOMETHASONE DIPROPIONATE HFA 80 UG/1
AEROSOL, METERED RESPIRATORY (INHALATION)
Qty: 10.6 G | Refills: 10 | Status: SHIPPED | OUTPATIENT
Start: 2018-11-18 | End: 2019-05-08 | Stop reason: CLARIF

## 2018-11-27 ENCOUNTER — PATIENT MESSAGE (OUTPATIENT)
Dept: PULMONOLOGY | Facility: CLINIC | Age: 58
End: 2018-11-27

## 2018-12-10 ENCOUNTER — OFFICE VISIT (OUTPATIENT)
Dept: PULMONOLOGY | Facility: CLINIC | Age: 58
End: 2018-12-10
Payer: MEDICAID

## 2018-12-10 ENCOUNTER — HOSPITAL ENCOUNTER (OUTPATIENT)
Dept: RADIOLOGY | Facility: HOSPITAL | Age: 58
Discharge: HOME | End: 2018-12-10
Attending: INTERNAL MEDICINE
Payer: MEDICAID

## 2018-12-10 ENCOUNTER — CLINICAL SUPPORT (OUTPATIENT)
Dept: PULMONOLOGY | Facility: CLINIC | Age: 58
End: 2018-12-10
Payer: MEDICAID

## 2018-12-10 VITALS
DIASTOLIC BLOOD PRESSURE: 74 MMHG | WEIGHT: 166 LBS | HEART RATE: 98 BPM | BODY MASS INDEX: 28.34 KG/M2 | OXYGEN SATURATION: 96 % | SYSTOLIC BLOOD PRESSURE: 126 MMHG | RESPIRATION RATE: 18 BRPM | HEIGHT: 64 IN

## 2018-12-10 DIAGNOSIS — R09.02 HYPOXEMIA REQUIRING SUPPLEMENTAL OXYGEN: ICD-10-CM

## 2018-12-10 DIAGNOSIS — J44.9 STAGE 3 SEVERE COPD BY GOLD CLASSIFICATION: ICD-10-CM

## 2018-12-10 DIAGNOSIS — R05.3 COUGH, PERSISTENT: ICD-10-CM

## 2018-12-10 DIAGNOSIS — Z99.81 HYPOXEMIA REQUIRING SUPPLEMENTAL OXYGEN: ICD-10-CM

## 2018-12-10 DIAGNOSIS — F17.200 TOBACCO DEPENDENCE: Chronic | ICD-10-CM

## 2018-12-10 DIAGNOSIS — J44.9 STAGE 3 SEVERE COPD BY GOLD CLASSIFICATION: Primary | ICD-10-CM

## 2018-12-10 LAB
BRPFT: ABNORMAL
FEF 25 75 LLN: 1.28
FEF 25 75 PRE REF: 12.6 %
FEF 25 75 REF: 2.42
FEV1 FVC LLN: 68
FEV1 FVC PRE REF: 49.8 %
FEV1 FVC REF: 80
FEV1 LLN: 1.98
FEV1 PRE REF: 40.7 %
FEV1 REF: 2.59
FEV6 LLN: 2.62
FEV6 PRE REF: 64.2 %
FEV6 PRE: 2.12 L (ref 2.62–3.98)
FEV6 REF: 3.3
FVC LLN: 2.5
FVC PRE REF: 81.2 %
FVC REF: 3.27
MVV LLN: 82
MVV REF: 97
PEF LLN: 4.78
PEF PRE REF: 41.4 %
PEF REF: 6.49
PRE FEF 25 75: 0.3 L/S (ref 1.28–3.92)
PRE FET 100: 14.12 SEC
PRE FEV1 FVC: 39.68 % (ref 68.05–89.63)
PRE FEV1: 1.05 L (ref 1.98–3.18)
PRE FVC: 2.65 L (ref 2.5–4.06)
PRE PEF: 2.69 L/S (ref 4.78–8.21)

## 2018-12-10 PROCEDURE — 71046 X-RAY EXAM CHEST 2 VIEWS: CPT | Mod: 26,,, | Performed by: RADIOLOGY

## 2018-12-10 PROCEDURE — 94010 BREATHING CAPACITY TEST: CPT | Mod: PBBFAC,PO

## 2018-12-10 PROCEDURE — 99999 PR PBB SHADOW E&M-EST. PATIENT-LVL III: CPT | Mod: PBBFAC,,, | Performed by: INTERNAL MEDICINE

## 2018-12-10 PROCEDURE — 99214 OFFICE O/P EST MOD 30 MIN: CPT | Mod: 25,S$PBB,, | Performed by: INTERNAL MEDICINE

## 2018-12-10 PROCEDURE — 99213 OFFICE O/P EST LOW 20 MIN: CPT | Mod: PBBFAC,25,PO | Performed by: INTERNAL MEDICINE

## 2018-12-10 PROCEDURE — 71046 X-RAY EXAM CHEST 2 VIEWS: CPT | Mod: TC,FY,PO

## 2018-12-10 PROCEDURE — 94010 BREATHING CAPACITY TEST: CPT | Mod: 26,S$PBB,, | Performed by: INTERNAL MEDICINE

## 2018-12-10 RX ORDER — PROMETHAZINE HYDROCHLORIDE AND CODEINE PHOSPHATE 6.25; 1 MG/5ML; MG/5ML
5 SOLUTION ORAL EVERY 4 HOURS PRN
Qty: 240 ML | Refills: 0 | Status: SHIPPED | OUTPATIENT
Start: 2018-12-10 | End: 2019-06-14 | Stop reason: SDUPTHER

## 2018-12-10 NOTE — ASSESSMENT & PLAN NOTE
CAT score 32  mRC score 1- 2  Stiolto Respimat and Qvar and albuterol  Chr Cough needing antitussives  FEV1 1.05 (40.7%)  Has been taking QVAR once daily     Group D: High risk More symptoms

## 2018-12-19 DIAGNOSIS — F17.200 TOBACCO DEPENDENCE: Chronic | ICD-10-CM

## 2018-12-19 RX ORDER — VARENICLINE TARTRATE 0.5 (11)-1
KIT ORAL
Qty: 53 TABLET | Refills: 0 | Status: SHIPPED | OUTPATIENT
Start: 2018-12-19 | End: 2019-07-10 | Stop reason: SDUPTHER

## 2019-01-23 ENCOUNTER — TELEPHONE (OUTPATIENT)
Dept: PULMONOLOGY | Facility: CLINIC | Age: 59
End: 2019-01-23

## 2019-01-23 NOTE — TELEPHONE ENCOUNTER
Pt is currently admitted to [facility].  She states she wants to transfer to [facility].  She states that [facility] staff told her an [facility]  has to facilitate a transfer.  Can you do that?

## 2019-01-23 NOTE — TELEPHONE ENCOUNTER
----- Message from Carmenza Lopez sent at 1/23/2019 12:41 PM CST -----  Contact: patient  States she was admitted in to [facility] and want to be transferred to [facility]. Please call patient today ASAP URGENT!! @ [phone]. Thanks, peg

## 2019-01-23 NOTE — TELEPHONE ENCOUNTER
Informed pt that Dr. Toscano explained that she should stay and be treated at [facility]. Pt stated that she was fine with that.     done

## 2019-01-23 NOTE — TELEPHONE ENCOUNTER
Spoke with patient  Explained EMTALA rules  Transfer would meet necessity if higher level of care we would offer is not available at [facility]  Patient upset, not getting her pain meds  Discouraged her from leaving [place]

## 2019-01-26 ENCOUNTER — HOSPITAL ENCOUNTER (OUTPATIENT)
Facility: HOSPITAL | Age: 59
Discharge: HOME | End: 2019-01-26
Attending: EMERGENCY MEDICINE | Admitting: INTERNAL MEDICINE
Payer: MEDICAID

## 2019-01-26 ENCOUNTER — PATIENT MESSAGE (OUTPATIENT)
Dept: PULMONOLOGY | Facility: CLINIC | Age: 59
End: 2019-01-26

## 2019-01-26 VITALS
OXYGEN SATURATION: 92 % | WEIGHT: 168.44 LBS | BODY MASS INDEX: 28.76 KG/M2 | SYSTOLIC BLOOD PRESSURE: 153 MMHG | HEIGHT: 64 IN | RESPIRATION RATE: 21 BRPM | TEMPERATURE: 99 F | DIASTOLIC BLOOD PRESSURE: 70 MMHG | HEART RATE: 111 BPM

## 2019-01-26 DIAGNOSIS — Z72.0 TOBACCO ABUSE: ICD-10-CM

## 2019-01-26 DIAGNOSIS — J44.1 COPD WITH ACUTE EXACERBATION: Primary | ICD-10-CM

## 2019-01-26 DIAGNOSIS — R06.82 TACHYPNEA: ICD-10-CM

## 2019-01-26 PROBLEM — J20.9 ACUTE BRONCHITIS: Status: ACTIVE | Noted: 2019-01-26

## 2019-01-26 LAB
ALBUMIN SERPL BCP-MCNC: 3.2 G/DL
ALBUMIN SERPL BCP-MCNC: 3.3 G/DL
ALP SERPL-CCNC: 86 U/L
ALP SERPL-CCNC: 87 U/L
ALT SERPL W/O P-5'-P-CCNC: 12 U/L
ALT SERPL W/O P-5'-P-CCNC: 13 U/L
ANION GAP SERPL CALC-SCNC: 6 MMOL/L
ANION GAP SERPL CALC-SCNC: 9 MMOL/L
ANISOCYTOSIS BLD QL SMEAR: SLIGHT
AST SERPL-CCNC: 10 U/L
AST SERPL-CCNC: 13 U/L
BASO STIPL BLD QL SMEAR: ABNORMAL
BASOPHILS # BLD AUTO: 0.01 K/UL
BASOPHILS # BLD AUTO: 0.02 K/UL
BASOPHILS NFR BLD: 0.1 %
BASOPHILS NFR BLD: 0.2 %
BILIRUB SERPL-MCNC: 0.2 MG/DL
BILIRUB SERPL-MCNC: 0.2 MG/DL
BUN SERPL-MCNC: 14 MG/DL
BUN SERPL-MCNC: 16 MG/DL
CALCIUM SERPL-MCNC: 8.5 MG/DL
CALCIUM SERPL-MCNC: 8.8 MG/DL
CHLORIDE SERPL-SCNC: 103 MMOL/L
CHLORIDE SERPL-SCNC: 104 MMOL/L
CO2 SERPL-SCNC: 28 MMOL/L
CO2 SERPL-SCNC: 31 MMOL/L
CREAT SERPL-MCNC: 0.7 MG/DL
CREAT SERPL-MCNC: 0.7 MG/DL
DACRYOCYTES BLD QL SMEAR: ABNORMAL
DIFFERENTIAL METHOD: ABNORMAL
DIFFERENTIAL METHOD: ABNORMAL
EOSINOPHIL # BLD AUTO: 0 K/UL
EOSINOPHIL # BLD AUTO: 0.1 K/UL
EOSINOPHIL NFR BLD: 0.4 %
EOSINOPHIL NFR BLD: 0.7 %
ERYTHROCYTE [DISTWIDTH] IN BLOOD BY AUTOMATED COUNT: 13.2 %
ERYTHROCYTE [DISTWIDTH] IN BLOOD BY AUTOMATED COUNT: 13.3 %
EST. GFR  (AFRICAN AMERICAN): >60 ML/MIN/1.73 M^2
EST. GFR  (AFRICAN AMERICAN): >60 ML/MIN/1.73 M^2
EST. GFR  (NON AFRICAN AMERICAN): >60 ML/MIN/1.73 M^2
EST. GFR  (NON AFRICAN AMERICAN): >60 ML/MIN/1.73 M^2
GLUCOSE SERPL-MCNC: 193 MG/DL
GLUCOSE SERPL-MCNC: 223 MG/DL
HCT VFR BLD AUTO: 36 %
HCT VFR BLD AUTO: 36.8 %
HGB BLD-MCNC: 11.7 G/DL
HGB BLD-MCNC: 12.2 G/DL
INR PPP: 1
LYMPHOCYTES # BLD AUTO: 1.4 K/UL
LYMPHOCYTES # BLD AUTO: 2.3 K/UL
LYMPHOCYTES NFR BLD: 15 %
LYMPHOCYTES NFR BLD: 26.3 %
MCH RBC QN AUTO: 34.2 PG
MCH RBC QN AUTO: 35.1 PG
MCHC RBC AUTO-ENTMCNC: 32.5 G/DL
MCHC RBC AUTO-ENTMCNC: 33.2 G/DL
MCV RBC AUTO: 105 FL
MCV RBC AUTO: 106 FL
MONOCYTES # BLD AUTO: 0.7 K/UL
MONOCYTES # BLD AUTO: 0.7 K/UL
MONOCYTES NFR BLD: 7.5 %
MONOCYTES NFR BLD: 7.6 %
NEUTROPHILS # BLD AUTO: 5.7 K/UL
NEUTROPHILS # BLD AUTO: 7.3 K/UL
NEUTROPHILS NFR BLD: 66.9 %
NEUTROPHILS NFR BLD: 76.9 %
PLATELET # BLD AUTO: 285 K/UL
PLATELET # BLD AUTO: 286 K/UL
PLATELET BLD QL SMEAR: ABNORMAL
PMV BLD AUTO: 9.4 FL
PMV BLD AUTO: 9.5 FL
POIKILOCYTOSIS BLD QL SMEAR: SLIGHT
POLYCHROMASIA BLD QL SMEAR: ABNORMAL
POTASSIUM SERPL-SCNC: 3.4 MMOL/L
POTASSIUM SERPL-SCNC: 3.4 MMOL/L
PROT SERPL-MCNC: 6.4 G/DL
PROT SERPL-MCNC: 6.6 G/DL
PROTHROMBIN TIME: 10.9 SEC
RBC # BLD AUTO: 3.42 M/UL
RBC # BLD AUTO: 3.48 M/UL
SODIUM SERPL-SCNC: 140 MMOL/L
SODIUM SERPL-SCNC: 141 MMOL/L
TARGETS BLD QL SMEAR: ABNORMAL
WBC # BLD AUTO: 8.74 K/UL
WBC # BLD AUTO: 9.52 K/UL

## 2019-01-26 PROCEDURE — G0378 HOSPITAL OBSERVATION PER HR: HCPCS

## 2019-01-26 PROCEDURE — 25000003 PHARM REV CODE 250: Performed by: PHYSICIAN ASSISTANT

## 2019-01-26 PROCEDURE — 96375 TX/PRO/DX INJ NEW DRUG ADDON: CPT

## 2019-01-26 PROCEDURE — 99900035 HC TECH TIME PER 15 MIN (STAT)

## 2019-01-26 PROCEDURE — 25000242 PHARM REV CODE 250 ALT 637 W/ HCPCS: Performed by: EMERGENCY MEDICINE

## 2019-01-26 PROCEDURE — 25000003 PHARM REV CODE 250: Performed by: INTERNAL MEDICINE

## 2019-01-26 PROCEDURE — 94761 N-INVAS EAR/PLS OXIMETRY MLT: CPT

## 2019-01-26 PROCEDURE — 85025 COMPLETE CBC W/AUTO DIFF WBC: CPT

## 2019-01-26 PROCEDURE — 94640 AIRWAY INHALATION TREATMENT: CPT

## 2019-01-26 PROCEDURE — 85610 PROTHROMBIN TIME: CPT

## 2019-01-26 PROCEDURE — 99285 EMERGENCY DEPT VISIT HI MDM: CPT | Mod: 25

## 2019-01-26 PROCEDURE — 96374 THER/PROPH/DIAG INJ IV PUSH: CPT

## 2019-01-26 PROCEDURE — 25000003 PHARM REV CODE 250: Performed by: EMERGENCY MEDICINE

## 2019-01-26 PROCEDURE — 80053 COMPREHEN METABOLIC PANEL: CPT | Mod: 91

## 2019-01-26 PROCEDURE — 63600175 PHARM REV CODE 636 W HCPCS: Performed by: INTERNAL MEDICINE

## 2019-01-26 PROCEDURE — 36415 COLL VENOUS BLD VENIPUNCTURE: CPT

## 2019-01-26 PROCEDURE — 96372 THER/PROPH/DIAG INJ SC/IM: CPT | Mod: 59

## 2019-01-26 PROCEDURE — 63600175 PHARM REV CODE 636 W HCPCS: Performed by: EMERGENCY MEDICINE

## 2019-01-26 PROCEDURE — 27000221 HC OXYGEN, UP TO 24 HOURS

## 2019-01-26 PROCEDURE — S4991 NICOTINE PATCH NONLEGEND: HCPCS | Performed by: INTERNAL MEDICINE

## 2019-01-26 PROCEDURE — 25000242 PHARM REV CODE 250 ALT 637 W/ HCPCS: Performed by: INTERNAL MEDICINE

## 2019-01-26 RX ORDER — LEVOFLOXACIN 500 MG/1
500 TABLET, FILM COATED ORAL DAILY
Status: DISCONTINUED | OUTPATIENT
Start: 2019-01-26 | End: 2019-01-26

## 2019-01-26 RX ORDER — IPRATROPIUM BROMIDE 0.5 MG/2.5ML
0.5 SOLUTION RESPIRATORY (INHALATION) EVERY 12 HOURS
Status: DISCONTINUED | OUTPATIENT
Start: 2019-01-26 | End: 2019-01-26

## 2019-01-26 RX ORDER — DIPHENHYDRAMINE HCL 25 MG
25 CAPSULE ORAL EVERY 6 HOURS PRN
Status: DISCONTINUED | OUTPATIENT
Start: 2019-01-26 | End: 2019-01-26 | Stop reason: HOSPADM

## 2019-01-26 RX ORDER — HYDRALAZINE HYDROCHLORIDE 20 MG/ML
10 INJECTION INTRAMUSCULAR; INTRAVENOUS EVERY 6 HOURS PRN
Status: DISCONTINUED | OUTPATIENT
Start: 2019-01-26 | End: 2019-01-26 | Stop reason: HOSPADM

## 2019-01-26 RX ORDER — ARFORMOTEROL TARTRATE 15 UG/2ML
15 SOLUTION RESPIRATORY (INHALATION) 2 TIMES DAILY
Status: DISCONTINUED | OUTPATIENT
Start: 2019-01-26 | End: 2019-01-26 | Stop reason: HOSPADM

## 2019-01-26 RX ORDER — IPRATROPIUM BROMIDE AND ALBUTEROL SULFATE 2.5; .5 MG/3ML; MG/3ML
SOLUTION RESPIRATORY (INHALATION)
Qty: 1 BOX | Refills: 0
Start: 2019-01-26 | End: 2020-09-24 | Stop reason: SDUPTHER

## 2019-01-26 RX ORDER — ENOXAPARIN SODIUM 100 MG/ML
40 INJECTION SUBCUTANEOUS EVERY 24 HOURS
Status: DISCONTINUED | OUTPATIENT
Start: 2019-01-26 | End: 2019-01-26 | Stop reason: HOSPADM

## 2019-01-26 RX ORDER — ACETAMINOPHEN 325 MG/1
650 TABLET ORAL EVERY 8 HOURS PRN
Status: DISCONTINUED | OUTPATIENT
Start: 2019-01-26 | End: 2019-01-26 | Stop reason: HOSPADM

## 2019-01-26 RX ORDER — OXYCODONE AND ACETAMINOPHEN 10; 325 MG/1; MG/1
1 TABLET ORAL EVERY 4 HOURS PRN
Status: DISCONTINUED | OUTPATIENT
Start: 2019-01-26 | End: 2019-01-26 | Stop reason: HOSPADM

## 2019-01-26 RX ORDER — IPRATROPIUM BROMIDE AND ALBUTEROL SULFATE 2.5; .5 MG/3ML; MG/3ML
3 SOLUTION RESPIRATORY (INHALATION)
Status: COMPLETED | OUTPATIENT
Start: 2019-01-26 | End: 2019-01-26

## 2019-01-26 RX ORDER — TRAMADOL HYDROCHLORIDE 50 MG/1
50 TABLET ORAL EVERY 6 HOURS PRN
Status: DISCONTINUED | OUTPATIENT
Start: 2019-01-26 | End: 2019-01-26 | Stop reason: SDUPTHER

## 2019-01-26 RX ORDER — PROMETHAZINE HYDROCHLORIDE AND CODEINE PHOSPHATE 6.25; 1 MG/5ML; MG/5ML
5 SOLUTION ORAL EVERY 4 HOURS PRN
Status: DISCONTINUED | OUTPATIENT
Start: 2019-01-26 | End: 2019-01-26 | Stop reason: HOSPADM

## 2019-01-26 RX ORDER — FAMOTIDINE 20 MG/1
20 TABLET, FILM COATED ORAL 2 TIMES DAILY
Status: DISCONTINUED | OUTPATIENT
Start: 2019-01-26 | End: 2019-01-26 | Stop reason: HOSPADM

## 2019-01-26 RX ORDER — CLONIDINE HYDROCHLORIDE 0.1 MG/1
0.1 TABLET ORAL EVERY 4 HOURS PRN
Status: DISCONTINUED | OUTPATIENT
Start: 2019-01-26 | End: 2019-01-26 | Stop reason: HOSPADM

## 2019-01-26 RX ORDER — MAG HYDROX/ALUMINUM HYD/SIMETH 200-200-20
30 SUSPENSION, ORAL (FINAL DOSE FORM) ORAL EVERY 6 HOURS PRN
Status: DISCONTINUED | OUTPATIENT
Start: 2019-01-26 | End: 2019-01-26 | Stop reason: HOSPADM

## 2019-01-26 RX ORDER — AMOXICILLIN AND CLAVULANATE POTASSIUM 875; 125 MG/1; MG/1
1 TABLET, FILM COATED ORAL EVERY 12 HOURS
Qty: 10 TABLET | Refills: 0 | Status: SHIPPED | OUTPATIENT
Start: 2019-01-26 | End: 2019-01-31

## 2019-01-26 RX ORDER — CLONAZEPAM 1 MG/1
1 TABLET ORAL NIGHTLY
Status: DISCONTINUED | OUTPATIENT
Start: 2019-01-26 | End: 2019-01-26 | Stop reason: HOSPADM

## 2019-01-26 RX ORDER — POTASSIUM CHLORIDE 20 MEQ/1
40 TABLET, EXTENDED RELEASE ORAL ONCE
Status: COMPLETED | OUTPATIENT
Start: 2019-01-26 | End: 2019-01-26

## 2019-01-26 RX ORDER — HYDRALAZINE HYDROCHLORIDE 20 MG/ML
10 INJECTION INTRAMUSCULAR; INTRAVENOUS
Status: COMPLETED | OUTPATIENT
Start: 2019-01-26 | End: 2019-01-26

## 2019-01-26 RX ORDER — MORPHINE SULFATE 10 MG/ML
4 INJECTION INTRAMUSCULAR; INTRAVENOUS; SUBCUTANEOUS
Status: COMPLETED | OUTPATIENT
Start: 2019-01-26 | End: 2019-01-26

## 2019-01-26 RX ORDER — PREDNISONE 20 MG/1
60 TABLET ORAL
Status: COMPLETED | OUTPATIENT
Start: 2019-01-26 | End: 2019-01-26

## 2019-01-26 RX ORDER — METHYLPREDNISOLONE SOD SUCC 125 MG
80 VIAL (EA) INJECTION EVERY 8 HOURS
Status: DISCONTINUED | OUTPATIENT
Start: 2019-01-26 | End: 2019-01-26

## 2019-01-26 RX ORDER — MORPHINE SULFATE 10 MG/ML
4 INJECTION INTRAMUSCULAR; INTRAVENOUS; SUBCUTANEOUS EVERY 4 HOURS PRN
Status: DISCONTINUED | OUTPATIENT
Start: 2019-01-26 | End: 2019-01-26 | Stop reason: SDUPTHER

## 2019-01-26 RX ORDER — TERBUTALINE SULFATE 1 MG/ML
0.25 INJECTION SUBCUTANEOUS ONCE
Status: COMPLETED | OUTPATIENT
Start: 2019-01-26 | End: 2019-01-26

## 2019-01-26 RX ORDER — HYDROCODONE BITARTRATE AND ACETAMINOPHEN 5; 325 MG/1; MG/1
1 TABLET ORAL EVERY 4 HOURS PRN
Status: DISCONTINUED | OUTPATIENT
Start: 2019-01-26 | End: 2019-01-26 | Stop reason: SDUPTHER

## 2019-01-26 RX ORDER — PREDNISONE 20 MG/1
TABLET ORAL
Qty: 20 TABLET | Refills: 0 | Status: SHIPPED | OUTPATIENT
Start: 2019-01-26 | End: 2020-01-15 | Stop reason: ALTCHOICE

## 2019-01-26 RX ORDER — SODIUM CHLORIDE 0.9 % (FLUSH) 0.9 %
5 SYRINGE (ML) INJECTION
Status: DISCONTINUED | OUTPATIENT
Start: 2019-01-26 | End: 2019-01-26 | Stop reason: HOSPADM

## 2019-01-26 RX ORDER — GUAIFENESIN 100 MG/5ML
200 SOLUTION ORAL EVERY 4 HOURS PRN
Status: DISCONTINUED | OUTPATIENT
Start: 2019-01-26 | End: 2019-01-26 | Stop reason: HOSPADM

## 2019-01-26 RX ORDER — IPRATROPIUM BROMIDE AND ALBUTEROL SULFATE 2.5; .5 MG/3ML; MG/3ML
3 SOLUTION RESPIRATORY (INHALATION) EVERY 4 HOURS
Status: DISCONTINUED | OUTPATIENT
Start: 2019-01-26 | End: 2019-01-26 | Stop reason: HOSPADM

## 2019-01-26 RX ORDER — METHYLPREDNISOLONE SOD SUCC 125 MG
80 VIAL (EA) INJECTION EVERY 8 HOURS
Status: DISCONTINUED | OUTPATIENT
Start: 2019-01-26 | End: 2019-01-26 | Stop reason: HOSPADM

## 2019-01-26 RX ORDER — ONDANSETRON 2 MG/ML
4 INJECTION INTRAMUSCULAR; INTRAVENOUS EVERY 12 HOURS PRN
Status: DISCONTINUED | OUTPATIENT
Start: 2019-01-26 | End: 2019-01-26 | Stop reason: HOSPADM

## 2019-01-26 RX ORDER — OXYCODONE AND ACETAMINOPHEN 5; 325 MG/1; MG/1
1 TABLET ORAL EVERY 4 HOURS PRN
Status: DISCONTINUED | OUTPATIENT
Start: 2019-01-26 | End: 2019-01-26 | Stop reason: HOSPADM

## 2019-01-26 RX ORDER — LEVOFLOXACIN 500 MG/1
500 TABLET, FILM COATED ORAL
Status: COMPLETED | OUTPATIENT
Start: 2019-01-26 | End: 2019-01-26

## 2019-01-26 RX ORDER — OXYCODONE HYDROCHLORIDE 5 MG/1
10 TABLET ORAL ONCE AS NEEDED
Status: COMPLETED | OUTPATIENT
Start: 2019-01-26 | End: 2019-01-26

## 2019-01-26 RX ORDER — IBUPROFEN 200 MG
1 TABLET ORAL DAILY
Status: DISCONTINUED | OUTPATIENT
Start: 2019-01-26 | End: 2019-01-26 | Stop reason: HOSPADM

## 2019-01-26 RX ADMIN — IPRATROPIUM BROMIDE AND ALBUTEROL SULFATE 3 ML: .5; 3 SOLUTION RESPIRATORY (INHALATION) at 02:01

## 2019-01-26 RX ADMIN — OXYCODONE HYDROCHLORIDE AND ACETAMINOPHEN 1 TABLET: 10; 325 TABLET ORAL at 08:01

## 2019-01-26 RX ADMIN — OXYCODONE HYDROCHLORIDE 10 MG: 5 TABLET ORAL at 12:01

## 2019-01-26 RX ADMIN — HYDRALAZINE HYDROCHLORIDE 10 MG: 20 INJECTION INTRAMUSCULAR; INTRAVENOUS at 06:01

## 2019-01-26 RX ADMIN — PREDNISONE 60 MG: 20 TABLET ORAL at 03:01

## 2019-01-26 RX ADMIN — IPRATROPIUM BROMIDE AND ALBUTEROL SULFATE 3 ML: .5; 3 SOLUTION RESPIRATORY (INHALATION) at 03:01

## 2019-01-26 RX ADMIN — CEFTRIAXONE 2 G: 2 INJECTION, SOLUTION INTRAVENOUS at 08:01

## 2019-01-26 RX ADMIN — FAMOTIDINE 20 MG: 20 TABLET, FILM COATED ORAL at 08:01

## 2019-01-26 RX ADMIN — ARFORMOTEROL TARTRATE 15 MCG: 15 SOLUTION RESPIRATORY (INHALATION) at 07:01

## 2019-01-26 RX ADMIN — NICOTINE 1 PATCH: 21 PATCH, EXTENDED RELEASE TRANSDERMAL at 08:01

## 2019-01-26 RX ADMIN — METHYLPREDNISOLONE SODIUM SUCCINATE 80 MG: 125 INJECTION, POWDER, FOR SOLUTION INTRAMUSCULAR; INTRAVENOUS at 05:01

## 2019-01-26 RX ADMIN — IPRATROPIUM BROMIDE AND ALBUTEROL SULFATE 3 ML: .5; 3 SOLUTION RESPIRATORY (INHALATION) at 05:01

## 2019-01-26 RX ADMIN — IPRATROPIUM BROMIDE AND ALBUTEROL SULFATE 3 ML: .5; 3 SOLUTION RESPIRATORY (INHALATION) at 11:01

## 2019-01-26 RX ADMIN — IPRATROPIUM BROMIDE AND ALBUTEROL SULFATE 3 ML: .5; 3 SOLUTION RESPIRATORY (INHALATION) at 07:01

## 2019-01-26 RX ADMIN — POTASSIUM CHLORIDE 40 MEQ: 1500 TABLET, EXTENDED RELEASE ORAL at 09:01

## 2019-01-26 RX ADMIN — LEVOFLOXACIN 500 MG: 500 TABLET, FILM COATED ORAL at 05:01

## 2019-01-26 RX ADMIN — TERBUTALINE SULFATE 0.25 MG: 1 INJECTION, SOLUTION SUBCUTANEOUS at 03:01

## 2019-01-26 RX ADMIN — MORPHINE SULFATE 4 MG: 10 INJECTION INTRAVENOUS at 03:01

## 2019-01-26 RX ADMIN — AZITHROMYCIN MONOHYDRATE 500 MG: 500 INJECTION, POWDER, LYOPHILIZED, FOR SOLUTION INTRAVENOUS at 08:01

## 2019-01-26 NOTE — PROGRESS NOTES
Home Oxygen Evaluation    Date Performed: 2019    1) Patient's Home O2 Sat on room air, while at rest: 92        If O2 sats on room air at rest are 88% or below, patient qualifies. No additional testing needed. Document N/A in steps 2 and 3. If 89% or above, complete steps 2.      2) Patient's O2 Sat on room air while exercisin      If O2 sats on room air while exercising remain 89% or above patient does not qualify, no further testing needed Document N/A in step 3. If O2 sats on room air while exercising are 88% or below, continue to step 3.      3) Patient's O2 Sat while exercising on O2: NA at  LPM         (Must show improvement from #2 for patients to qualify)    If O2 sats improve on oxygen, patient qualifies for portable oxygen. If not, the patient does not qualify.

## 2019-01-26 NOTE — ASSESSMENT & PLAN NOTE
Solu-Medrol 80 mg every 8 hr.  Bronchodilators every 6 hr scheduled.  Continue supplemental oxygen to maintain saturations greater than 90%.

## 2019-01-26 NOTE — ED NOTES
Waiting for portable cardiac monitor to become available before administering hydralazine to patient

## 2019-01-26 NOTE — H&P
"[facility] Hospital Medicine  History & Physical    Patient Name: Sarah Monahan  MRN: [id number]  Admission Date: 2019  Attending Physician: Roger Skaggs MD  Primary Care Provider: Lynette Yang MD         Patient information was obtained from patient, past medical records and ER records.     Subjective:     Principal Problem:COPD with acute exacerbation    Chief Complaint:   Chief Complaint   Patient presents with    Shortness of Breath     x several days. Pt left [facility] AMA and then this ED LWBS tonight.        HPI: Ms. Monahan is a 58-year-old  female with PMH significant for COPD, tobacco use, hypertension, chronic back pain, presents to [facility] ED complaining of worsening shortness of breath associated with wheezing and cough.  She was admitted at [facility] on 19, but left AMA ("I got aggravated with a nurse").  In the ED she was found to be profusely wheezing, with dry nonproductive cough.  Oxygen saturations 94% on 2 L nasal cannula, chest x-ray reveals no evidence of infiltrates masses or effusions.  Changes consistent with emphysema.  Patient admitted with a diagnosis of COPD exacerbation.    Past Medical History:   Diagnosis Date    Acid reflux     Anemia     Asthma     Bronchitis chronic     Diabetes mellitus     pt states she's not a diabetic    DVT, lower extremity     RLE    Emphysema of lung     Hepatitis B     Hepatitis C     Herniated disc     Hyperlipidemia     Kidney stone     Lung disease     Oxygen dependent     nightly    Supraventricular tachycardia     Urinary tract infection        Past Surgical History:   Procedure Laterality Date    abdominal laparotomy      BREAST SURGERY Right     lumpectomy x 2 benign    BRONCHOSCOPY N/A 2014    [facility]    CERVICAL FUSION      C4 & C5     SECTION, CLASSIC      CHOLECYSTECTOMY      CYSTOSCOPY W/ LASER " LITHOTRIPSY      CYSTOSCOPY WITH STENT PLACEMENT Right 4/18/2017    [facility] OR    CYSTOSCOPY WITH STENT REMOVAL Right 4/18/2017    [facility] at [facility] OR    ENDOBRONCHIAL ULTRASOUND (EBUS) N/A 6/25/2014    [facility] 2ND FLR    EXTRACTION-STONE-URETEROSCOPY Right 4/18/2017    Per[facility] at [facility] OR    HERNIA REPAIR      KNEE ARTHROSCOPY W/ ACL RECONSTRUCTION      L knee    LITHOTRIPSY-EXTRACORPOREAL SHOCK WAVE Right 4/4/2017    Per[facility] at [facility] OR    LITHOTRIPSY-LASER Right 4/18/2017    [facility] at [facility] OR    orif knee Right     PYELOGRAM-RETROGRADE Right 4/18/2017    Per[facility] at [facility] OR    SALPINGECTOMY Right        Review of patient's allergies indicates:   Allergen Reactions    Macrodantin [nitrofurantoin macrocrystalline] Hives and Itching    Nitrofurantoin Hives and Itching    Nitrofurantoin monohyd/m-cryst Hives and Itching       Current Facility-Administered Medications on File Prior to Encounter   Medication    [DISCONTINUED] albuterol nebulizer solution    [DISCONTINUED] albuterol-ipratropium 2.5 mg-0.5 mg/3 mL nebulizer solution    [DISCONTINUED] albuterol-ipratropium 2.5 mg-0.5 mg/3 mL nebulizer solution    [DISCONTINUED] aluminum-magnesium hydroxide-simethicone 200-200-20 mg/5 mL suspension    [DISCONTINUED] benzonatate capsule    [DISCONTINUED] bisacodyl EC tablet    [DISCONTINUED] bisacodyl suppository    [DISCONTINUED] clonazePAM tablet    [DISCONTINUED] dextrose 40 % gel    [DISCONTINUED] dextrose 50 % in water (D50W) injection    [DISCONTINUED] dextrose 50 % in water (D50W) injection    [DISCONTINUED] enoxaparin injection    [DISCONTINUED] fluticasone furoate DsDv    [DISCONTINUED] GENERIC EXTERNAL MEDICATION    [DISCONTINUED] GENERIC EXTERNAL MEDICATION     [DISCONTINUED] GENERIC EXTERNAL MEDICATION    [DISCONTINUED] GENERIC EXTERNAL MEDICATION    [DISCONTINUED] GENERIC EXTERNAL MEDICATION    [DISCONTINUED] glucagon (human recombinant) injection    [DISCONTINUED] guaiFENesin 12 hr tablet    [DISCONTINUED] HYDROcodone-acetaminophen  mg per tablet    [DISCONTINUED] insulin lispro injection    [DISCONTINUED] insulin pen    [DISCONTINUED] ipratropium 0.02 % nebulizer solution    [DISCONTINUED] magnesium hydroxide 400 mg/5 ml suspension    [DISCONTINUED] methocarbamol tablet    [DISCONTINUED] predniSONE tablet    [DISCONTINUED] traMADol tablet    [DISCONTINUED] traZODone tablet     Current Outpatient Medications on File Prior to Encounter   Medication Sig    albuterol (VENTOLIN HFA) 90 mcg/actuation inhaler Inhale 2 puffs into the lungs every 6 (six) hours as needed.    azithromycin (ZITHROMAX) 500 MG tablet     clonazePAM (KLONOPIN) 1 MG tablet Take 1 mg by mouth every evening.     predniSONE (DELTASONE) 20 MG tablet Take 3 tablets by mouth daily for 3 days ,then 2 tabs daily for 3 days, then 1 tablet by mouth daily for 3 days, then 1/2 tab by mouth daily for 3days    promethazine-dextromethorphan (PROMETHAZINE-DM) 6.25-15 mg/5 mL Syrp TAKE 5 MLS BY MOUTH FOUR TIMES A DAY AS NEEDED FOR COUGH    QVAR REDIHALER 80 mcg/actuation HFAB INHALE 2 PUFFS TWICE DAILY    ranitidine (ZANTAC) 150 MG tablet Take 300 mg by mouth nightly.     tiotropium-olodaterol (STIOLTO RESPIMAT) 2.5-2.5 mcg/actuation Mist Inhale 2 puffs into the lungs once daily.    tramadol (ULTRAM) 50 mg tablet TAKE ONE TABLET BY MOUTH EVERY 6 HOURS AS NEEDED FOR PAIN    baclofen (LIORESAL) 10 MG tablet Take 10 mg by mouth nightly as needed.     blood sugar diagnostic Strp 1 each.    blood-glucose meter Misc Use to check blood sugar    docusate sodium (COLACE) 100 MG capsule Take 1 capsule (100 mg total) by mouth 2 (two) times daily.    inhalation device (VORTEX HOLDING CHAMBER) Use  as directed for inhalation. For use with albuterol inhaler.    predniSONE (DELTASONE) 20 MG tablet Take 1 tablet (20 mg total) by mouth As instructed. 3tab po daily x3days,2tabs po daily x3days,1tab po daily x3days,1/2tab po daily x3days    promethazine-codeine 6.25-10 mg/5 ml (PHENERGAN WITH CODEINE) 6.25-10 mg/5 mL syrup Take 5 mLs by mouth every 4 (four) hours as needed for Cough.    varenicline (CHANTIX STARTING MONTH BOX) 0.5 mg (11)- 1 mg (42) tablet TAKE AS DIRECTED     Family History     Problem Relation (Age of Onset)    Cancer Mother, Maternal Grandmother    Diabetes Father, Paternal Grandmother    Heart attack Father (60)    Heart disease Father    Heart failure Paternal Grandmother    Hypertension Father        Tobacco Use    Smoking status: Current Every Day Smoker     Packs/day: 1.00     Years: 40.00     Pack years: 40.00     Last attempt to quit: 5/24/2015     Years since quitting: 3.6    Smokeless tobacco: Never Used    Tobacco comment: no smoking after m.n prior to sx   Substance and Sexual Activity    Alcohol use: No     Alcohol/week: 0.0 oz    Drug use: No    Sexual activity: Yes     Partners: Male     Review of Systems   Constitutional: Positive for chills and fatigue. Negative for diaphoresis and fever.   HENT: Negative.  Negative for congestion, nosebleeds and sinus pressure.    Eyes: Negative.  Negative for visual disturbance.   Respiratory: Positive for cough, shortness of breath and wheezing. Negative for chest tightness.    Cardiovascular: Negative.  Negative for chest pain, palpitations and leg swelling.   Gastrointestinal: Negative.  Negative for abdominal pain, diarrhea, nausea and vomiting.   Endocrine: Negative.  Negative for polyuria.   Genitourinary: Negative.  Negative for dysuria, flank pain, frequency and urgency.   Musculoskeletal: Positive for back pain. Negative for joint swelling and neck stiffness.   Skin: Negative.  Negative for color change, pallor and rash.    Allergic/Immunologic: Negative.  Negative for immunocompromised state.   Neurological: Negative.  Negative for dizziness, syncope, speech difficulty, numbness and headaches.   Hematological: Negative.  Negative for adenopathy. Does not bruise/bleed easily.   Psychiatric/Behavioral: Positive for decreased concentration. Negative for confusion and hallucinations. The patient is not nervous/anxious.    All other systems reviewed and are negative.    Objective:     Vital Signs (Most Recent):  Temp: 98.5 °F (36.9 °C) (01/26/19 0514)  Pulse: 104 (01/26/19 0555)  Resp: 20 (01/26/19 0555)  BP: (!) 172/80 (01/26/19 0514)  SpO2: (!) 94 % (01/26/19 0555) Vital Signs (24h Range):  Temp:  [98.1 °F (36.7 °C)-98.5 °F (36.9 °C)] 98.5 °F (36.9 °C)  Pulse:  [] 104  Resp:  [14-25] 20  SpO2:  [90 %-98 %] 94 %  BP: (157-187)/(80-89) 172/80     Weight: 79.7 kg (175 lb 11.2 oz)  Body mass index is 30.16 kg/m².    Physical Exam   Constitutional: She is oriented to person, place, and time. No distress.   Comfortably resting in bed, in no respiratory distress, but has not frequent bouts of nonproductive cough.  No family at the bedside.   HENT:   Head: Normocephalic and atraumatic.   Eyes: Conjunctivae and EOM are normal. No scleral icterus.   Neck: Normal range of motion. Neck supple.   Cardiovascular: Normal rate, regular rhythm, normal heart sounds and intact distal pulses.   No murmur heard.  Pulmonary/Chest: Effort normal. No respiratory distress. She has wheezes. She has no rales. She exhibits no tenderness.   Abdominal: Soft. Bowel sounds are normal. She exhibits no distension. There is no tenderness.   Musculoskeletal: Normal range of motion. She exhibits no edema or tenderness.   Neurological: She is alert and oriented to person, place, and time. No cranial nerve deficit. She exhibits normal muscle tone. Coordination normal.   Skin: Skin is warm and dry. She is not diaphoretic. No erythema.   Psychiatric: She has a normal  mood and affect. Her behavior is normal.   Nursing note and vitals reviewed.        CRANIAL NERVES     CN III, IV, VI   Extraocular motions are normal.        Significant Labs:   ABGs: No results for input(s): PH, PCO2, HCO3, POCSATURATED, BE, TOTALHB, COHB, METHB, O2HB, POCFIO2 in the last 48 hours.  Blood Culture: No results for input(s): LABBLOO in the last 48 hours.  BMP:   Recent Labs   Lab 01/26/19 0253 01/26/19  0520   * 223*    140   K 3.4* 3.4*    103   CO2 31* 28   BUN 16 14   CREATININE 0.7  --    CALCIUM 8.8 8.5*     CBC:   Recent Labs   Lab 01/26/19 0253 01/26/19  0520   WBC 8.74 9.52   HGB 12.2 11.7*   HCT 36.8* 36.0*    285     CMP:   Recent Labs   Lab 01/26/19 0253 01/26/19  0520    140   K 3.4* 3.4*    103   CO2 31* 28   * 223*   BUN 16 14   CREATININE 0.7  --    CALCIUM 8.8 8.5*   PROT 6.4 6.6   ALBUMIN 3.2* 3.3*   BILITOT 0.2 0.2   ALKPHOS 87 86   AST 10 13   ALT 12 13   ANIONGAP 6* 9   EGFRNONAA >60  --      All pertinent labs within the past 24 hours have been reviewed.    Significant Imaging: I have reviewed and interpreted all pertinent imaging results/findings within the past 24 hours.     Imaging Results          X-Ray Chest AP Portable (In process)                 I have independently reviewed and interpreted the EKG.     I have independently reviewed all pertinent labs within the past 24 hours.    I have independently reviewed, visualized and interpreted all pertinent imaging results within the past 24 hours and discussed the findings with the ED physician, Dr. Landry.            Assessment/Plan:     * COPD with acute exacerbation    Solu-Medrol 80 mg every 8 hr.  Bronchodilators every 6 hr scheduled.  Continue supplemental oxygen to maintain saturations greater than 90%.       Acute bronchitis    Denies fever, but complains of chills.  Dry nonproductive cough.  Declines to have influenza test as she reportedly tested negative at [facility] two days ago.  Continue IV Rocephin, Zithromax.       Anxiety    Continue home dose Klonopin as needed.       Low back pain    Chronically takes tramadol as needed at home.  Will continue the same medications.       Tobacco dependence    Nicotine patch.         VTE Risk Mitigation (From admission, onward)        Ordered     enoxaparin injection 40 mg  Daily      01/26/19 0601     Place sequential compression device  Until discontinued      01/26/19 0601     IP VTE LOW RISK PATIENT  Once      01/26/19 0400     Place GORAN hose  Until discontinued      01/26/19 0400             Roger Skaggs MD  Department of Hospital Medicine   [facility]

## 2019-01-26 NOTE — SUBJECTIVE & OBJECTIVE
Past Medical History:   Diagnosis Date    Acid reflux     Anemia     Asthma     Bronchitis chronic     Diabetes mellitus     pt states she's not a diabetic    DVT, lower extremity     RLE    Emphysema of lung     Hepatitis B     Hepatitis C     Herniated disc     Hyperlipidemia     Kidney stone     Lung disease     Oxygen dependent     nightly    Supraventricular tachycardia     Urinary tract infection        Past Surgical History:   Procedure Laterality Date    abdominal laparotomy      BREAST SURGERY Right     lumpectomy x 2 benign    BRONCHOSCOPY N/A 2014    [facility] 2ND FLR    CERVICAL FUSION      C4 & C5     SECTION, CLASSIC      CHOLECYSTECTOMY      CYSTOSCOPY W/ LASER LITHOTRIPSY      CYSTOSCOPY WITH STENT PLACEMENT Right 2017    [facility]    CYSTOSCOPY WITH STENT REMOVAL Right 2017    [facility] at [facility]    ENDOBRONCHIAL ULTRASOUND (EBUS) N/A 2014    Performed by [facility] 2ND FLR    EXTRACTION-STONE-URETEROSCOPY Right 2017    Per[facility] at [facility] OR    HERNIA REPAIR      KNEE ARTHROSCOPY W/ ACL RECONSTRUCTION      L knee    LITHOTRIPSY-EXTRACORPOREAL SHOCK WAVE Right 2017    [facility] at [facility] OR    LITHOTRIPSY-LASER Right 2017    [facility] at [facility] OR    orif knee Right     PYELOGRAM-RETROGRADE Right 2017    Per[facility] at [facility] OR    SALPINGECTOMY Right        Review of patient's allergies indicates:   Allergen Reactions    Macrodantin [nitrofurantoin macrocrystalline] Hives and Itching    Nitrofurantoin Hives and Itching    Nitrofurantoin monohyd/m-cryst Hives and Itching       Current Facility-Administered Medications on File Prior to Encounter   Medication    [DISCONTINUED] albuterol nebulizer  solution    [DISCONTINUED] albuterol-ipratropium 2.5 mg-0.5 mg/3 mL nebulizer solution    [DISCONTINUED] albuterol-ipratropium 2.5 mg-0.5 mg/3 mL nebulizer solution    [DISCONTINUED] aluminum-magnesium hydroxide-simethicone 200-200-20 mg/5 mL suspension    [DISCONTINUED] benzonatate capsule    [DISCONTINUED] bisacodyl EC tablet    [DISCONTINUED] bisacodyl suppository    [DISCONTINUED] clonazePAM tablet    [DISCONTINUED] dextrose 40 % gel    [DISCONTINUED] dextrose 50 % in water (D50W) injection    [DISCONTINUED] dextrose 50 % in water (D50W) injection    [DISCONTINUED] enoxaparin injection    [DISCONTINUED] fluticasone furoate DsDv    [DISCONTINUED] GENERIC EXTERNAL MEDICATION    [DISCONTINUED] GENERIC EXTERNAL MEDICATION    [DISCONTINUED] GENERIC EXTERNAL MEDICATION    [DISCONTINUED] GENERIC EXTERNAL MEDICATION    [DISCONTINUED] GENERIC EXTERNAL MEDICATION    [DISCONTINUED] glucagon (human recombinant) injection    [DISCONTINUED] guaiFENesin 12 hr tablet    [DISCONTINUED] HYDROcodone-acetaminophen  mg per tablet    [DISCONTINUED] insulin lispro injection    [DISCONTINUED] insulin pen    [DISCONTINUED] ipratropium 0.02 % nebulizer solution    [DISCONTINUED] magnesium hydroxide 400 mg/5 ml suspension    [DISCONTINUED] methocarbamol tablet    [DISCONTINUED] predniSONE tablet    [DISCONTINUED] traMADol tablet    [DISCONTINUED] traZODone tablet     Current Outpatient Medications on File Prior to Encounter   Medication Sig    albuterol (VENTOLIN HFA) 90 mcg/actuation inhaler Inhale 2 puffs into the lungs every 6 (six) hours as needed.    azithromycin (ZITHROMAX) 500 MG tablet     clonazePAM (KLONOPIN) 1 MG tablet Take 1 mg by mouth every evening.     predniSONE (DELTASONE) 20 MG tablet Take 3 tablets by mouth daily for 3 days ,then 2 tabs daily for 3 days, then 1 tablet by mouth daily for 3 days, then 1/2 tab by mouth daily for 3days    promethazine-dextromethorphan  (PROMETHAZINE-DM) 6.25-15 mg/5 mL Syrp TAKE 5 MLS BY MOUTH FOUR TIMES A DAY AS NEEDED FOR COUGH    QVAR REDIHALER 80 mcg/actuation HFAB INHALE 2 PUFFS TWICE DAILY    ranitidine (ZANTAC) 150 MG tablet Take 300 mg by mouth nightly.     tiotropium-olodaterol (STIOLTO RESPIMAT) 2.5-2.5 mcg/actuation Mist Inhale 2 puffs into the lungs once daily.    tramadol (ULTRAM) 50 mg tablet TAKE ONE TABLET BY MOUTH EVERY 6 HOURS AS NEEDED FOR PAIN    baclofen (LIORESAL) 10 MG tablet Take 10 mg by mouth nightly as needed.     blood sugar diagnostic Strp 1 each.    blood-glucose meter Misc Use to check blood sugar    docusate sodium (COLACE) 100 MG capsule Take 1 capsule (100 mg total) by mouth 2 (two) times daily.    inhalation device (VORTEX HOLDING CHAMBER) Use as directed for inhalation. For use with albuterol inhaler.    predniSONE (DELTASONE) 20 MG tablet Take 1 tablet (20 mg total) by mouth As instructed. 3tab po daily x3days,2tabs po daily x3days,1tab po daily x3days,1/2tab po daily x3days    promethazine-codeine 6.25-10 mg/5 ml (PHENERGAN WITH CODEINE) 6.25-10 mg/5 mL syrup Take 5 mLs by mouth every 4 (four) hours as needed for Cough.    varenicline (CHANTIX STARTING MONTH BOX) 0.5 mg (11)- 1 mg (42) tablet TAKE AS DIRECTED     Family History     Problem Relation (Age of Onset)    Cancer Mother, Maternal Grandmother    Diabetes Father, Paternal Grandmother    Heart attack Father (60)    Heart disease Father    Heart failure Paternal Grandmother    Hypertension Father        Tobacco Use    Smoking status: Current Every Day Smoker     Packs/day: 1.00     Years: 40.00     Pack years: 40.00     Last attempt to quit: 5/24/2015     Years since quitting: 3.6    Smokeless tobacco: Never Used    Tobacco comment: no smoking after m.n prior to sx   Substance and Sexual Activity    Alcohol use: No     Alcohol/week: 0.0 oz    Drug use: No    Sexual activity: Yes     Partners: Male     Review of Systems   Constitutional:  Positive for chills and fatigue. Negative for diaphoresis and fever.   HENT: Negative.  Negative for congestion, nosebleeds and sinus pressure.    Eyes: Negative.  Negative for visual disturbance.   Respiratory: Positive for cough, shortness of breath and wheezing. Negative for chest tightness.    Cardiovascular: Negative.  Negative for chest pain, palpitations and leg swelling.   Gastrointestinal: Negative.  Negative for abdominal pain, diarrhea, nausea and vomiting.   Endocrine: Negative.  Negative for polyuria.   Genitourinary: Negative.  Negative for dysuria, flank pain, frequency and urgency.   Musculoskeletal: Positive for back pain. Negative for joint swelling and neck stiffness.   Skin: Negative.  Negative for color change, pallor and rash.   Allergic/Immunologic: Negative.  Negative for immunocompromised state.   Neurological: Negative.  Negative for dizziness, syncope, speech difficulty, numbness and headaches.   Hematological: Negative.  Negative for adenopathy. Does not bruise/bleed easily.   Psychiatric/Behavioral: Positive for decreased concentration. Negative for confusion and hallucinations. The patient is not nervous/anxious.    All other systems reviewed and are negative.    Objective:     Vital Signs (Most Recent):  Temp: 98.5 °F (36.9 °C) (01/26/19 0514)  Pulse: 104 (01/26/19 0555)  Resp: 20 (01/26/19 0555)  BP: (!) 172/80 (01/26/19 0514)  SpO2: (!) 94 % (01/26/19 0555) Vital Signs (24h Range):  Temp:  [98.1 °F (36.7 °C)-98.5 °F (36.9 °C)] 98.5 °F (36.9 °C)  Pulse:  [] 104  Resp:  [14-25] 20  SpO2:  [90 %-98 %] 94 %  BP: (157-187)/(80-89) 172/80     Weight: 79.7 kg (175 lb 11.2 oz)  Body mass index is 30.16 kg/m².    Physical Exam   Constitutional: She is oriented to person, place, and time. No distress.   Comfortably resting in bed, in no respiratory distress, but has not frequent bouts of nonproductive cough.  No family at the bedside.   HENT:   Head: Normocephalic and atraumatic.   Eyes:  Conjunctivae and EOM are normal. No scleral icterus.   Neck: Normal range of motion. Neck supple.   Cardiovascular: Normal rate, regular rhythm, normal heart sounds and intact distal pulses.   No murmur heard.  Pulmonary/Chest: Effort normal. No respiratory distress. She has wheezes. She has no rales. She exhibits no tenderness.   Abdominal: Soft. Bowel sounds are normal. She exhibits no distension. There is no tenderness.   Musculoskeletal: Normal range of motion. She exhibits no edema or tenderness.   Neurological: She is alert and oriented to person, place, and time. No cranial nerve deficit. She exhibits normal muscle tone. Coordination normal.   Skin: Skin is warm and dry. She is not diaphoretic. No erythema.   Psychiatric: She has a normal mood and affect. Her behavior is normal.   Nursing note and vitals reviewed.        CRANIAL NERVES     CN III, IV, VI   Extraocular motions are normal.        Significant Labs:   ABGs: No results for input(s): PH, PCO2, HCO3, POCSATURATED, BE, TOTALHB, COHB, METHB, O2HB, POCFIO2 in the last 48 hours.  Blood Culture: No results for input(s): LABBLOO in the last 48 hours.  BMP:   Recent Labs   Lab 01/26/19 0253 01/26/19  0520   * 223*    140   K 3.4* 3.4*    103   CO2 31* 28   BUN 16 14   CREATININE 0.7  --    CALCIUM 8.8 8.5*     CBC:   Recent Labs   Lab 01/26/19 0253 01/26/19  0520   WBC 8.74 9.52   HGB 12.2 11.7*   HCT 36.8* 36.0*    285     CMP:   Recent Labs   Lab 01/26/19 0253 01/26/19  0520    140   K 3.4* 3.4*    103   CO2 31* 28   * 223*   BUN 16 14   CREATININE 0.7  --    CALCIUM 8.8 8.5*   PROT 6.4 6.6   ALBUMIN 3.2* 3.3*   BILITOT 0.2 0.2   ALKPHOS 87 86   AST 10 13   ALT 12 13   ANIONGAP 6* 9   EGFRNONAA >60  --      All pertinent labs within the past 24 hours have been reviewed.    Significant Imaging: I have reviewed and interpreted all pertinent imaging results/findings within the past 24 hours.     Imaging  Results          X-Ray Chest AP Portable (In process)                 I have independently reviewed and interpreted the EKG.     I have independently reviewed all pertinent labs within the past 24 hours.    I have independently reviewed, visualized and interpreted all pertinent imaging results within the past 24 hours and discussed the findings with the ED physician, Dr. Landry.

## 2019-01-26 NOTE — NURSING
Discharge instructions, medications, and appointments reviewed with patient and spouse. Both verbalized understanding.

## 2019-01-26 NOTE — HPI
"Sarah Monahan is a 58-year-old  female with COPD, ongoing tobacco use, hypertension and chronic back pain who presents to [facility] ED complaining of worsening shortness of breath associated with wheezing and cough.  She was admitted at [facility] on 01/22/19, but left AMA because, "I got aggravated with a nurse." In the ED here, she was found to be profusely wheezing with a dry nonproductive cough. Oxygen saturations were 94% on 2 liters nasal cannula and chest x-ray revealed no evidence of infiltrates, masses or effusions, but changes consistent with emphysema. The patient was placed in Observation with a diagnosis of COPD exacerbation.  "

## 2019-01-26 NOTE — ASSESSMENT & PLAN NOTE
Denies fever, but complains of chills.  Dry nonproductive cough.  Declines to have influenza test as she reportedly tested negative at [facility] two days ago.  Continue IV Rocephin, Zithromax.

## 2019-01-26 NOTE — ED NOTES
Attempted to call and give report to med surg nurse, she stated she was still busy getting report on her other patients.

## 2019-01-26 NOTE — ED PROVIDER NOTES
SCRIBE #1 NOTE: I, Elder Leon, am scribing for, and in the presence of, Marv Landry Jr., MD. I have scribed the entire note.      History      Chief Complaint   Patient presents with    Shortness of Breath     x several days. Pt left [facility] AMA and then this ED LWBS tonight.       Review of patient's allergies indicates:   Allergen Reactions    Macrodantin [nitrofurantoin macrocrystalline] Hives and Itching    Nitrofurantoin Hives and Itching    Nitrofurantoin monohyd/m-cryst Hives and Itching        HPI   HPI    1/26/2019, 2:14 AM   History obtained from the patient      History of Present Illness: Sarah Monahan is a 58 y.o. female patient with a hx of anemia, asthma, chronic bronchitis, DM, DVT, emphysema, hep B, hep C, HLD, supraventricular tachycardia who presents to the Emergency Department for SOB which worsened gradually several days ago. Pt was admitted to [facility] on 01/22  but left AMA. She states that she did not want to continue taking solumedrol because it worsens her back pain. Symptoms are constant and moderate in severity. No mitigating or exacerbating factors reported. Associated sxs include cough, wheezing, and a fever (tmax 102.7) on Tuesday- she has also felt feverish since. Patient denies any CP, palpitations, leg swelling, dizziness, HA, lightheadedness, chills, numbness, weakness, and all other sxs at this time. No further complaints or concerns at this time.         Arrival mode: AASI    PCP: Lynette Yang MD       Past Medical History:  Past Medical History:   Diagnosis Date    Acid reflux     Anemia     Asthma     Bronchitis chronic     Diabetes mellitus     pt states she's not a diabetic    DVT, lower extremity     RLE    Emphysema of lung     Hepatitis B     Hepatitis C     Herniated disc     Hyperlipidemia     Kidney stone     Lung disease     Oxygen dependent     nightly    Supraventricular tachycardia     Urinary tract infection        Past Surgical  History:  Past Surgical History:   Procedure Laterality Date    abdominal laparotomy      BREAST SURGERY Right     lumpectomy x 2 benign    BRONCHOSCOPY N/A 2014    Performed by [facility] at [facility] 2ND [facility]    CERVICAL FUSION      C4 & C5     SECTION, CLASSIC      CHOLECYSTECTOMY      CYSTOSCOPY W/ LASER LITHOTRIPSY      CYSTOSCOPY WITH STENT PLACEMENT Right 2017    Per[facility] at [facility]    CYSTOSCOPY WITH STENT REMOVAL Right 2017    Per[facility] at [facility]    ENDOBRONCHIAL ULTRASOUND (EBUS) N/A 2014    Performed by Sophie [facility] at [facility] 2ND [facility]    EXTRACTION-STONE-URETEROSCOPY Right 2017    Per[facility] at [facility] OR    HERNIA REPAIR      KNEE ARTHROSCOPY W/ ACL RECONSTRUCTION      L knee    LITHOTRIPSY-EXTRACORPOREAL SHOCK WAVE Right 2017    Per[facility] at [facility] OR    LITHOTRIPSY-LASER Right 2017    Per[facility] at [facility] OR    orif knee Right     PYELOGRAM-RETROGRADE Right 2017    [facility] at [facility]    SALPINGECTOMY Right          Family History:  Family History   Problem Relation Age of Onset    Cancer Mother     Diabetes Father     Hypertension Father     Heart disease Father     Heart attack Father 60        AMI- CABG    Cancer Maternal Grandmother     Diabetes Paternal Grandmother     Heart failure Paternal Grandmother        Social History:  Social History     Tobacco Use    Smoking status: Current Every Day Smoker     Packs/day: 1.00     Years: 40.00     Pack years: 40.00     Last attempt to quit: 2015     Years since quitting: 3.6    Smokeless tobacco: Never Used    Tobacco comment: no smoking after m.n prior to sx   Substance and Sexual Activity    Alcohol use: No     Alcohol/week: 0.0 oz    Drug use: No    Sexual activity: Yes     Partners: Male       RYLIE    Review of Systems   Constitutional: Positive for fever (102.7). Negative for chills.   HENT: Negative for congestion, rhinorrhea and sore throat.    Respiratory: Positive for cough, shortness of breath and wheezing. Negative for choking and stridor.    Cardiovascular: Negative for chest pain, palpitations and leg swelling.   Gastrointestinal: Negative for diarrhea, nausea and vomiting.   Genitourinary: Negative for dysuria.   Musculoskeletal: Negative for back pain.   Skin: Negative for rash.   Neurological: Negative for dizziness, weakness, light-headedness, numbness and headaches.   Hematological: Does not bruise/bleed easily.   All other systems reviewed and are negative.    Physical Exam      Initial Vitals [01/26/19 0211]   BP Pulse Resp Temp SpO2   (!) 161/89 95 14 98.1 °F (36.7 °C) (!) 94 %      MAP       --          Physical Exam  Nursing Notes and Vital Signs Reviewed.  Constitutional: Patient is in no acute distress. Well-developed and well-nourished.  Head: Atraumatic. Normocephalic.  Eyes: PERRL. EOM intact. Conjunctivae are not pale. No scleral icterus.  ENT: Mucous membranes are moist. Oropharynx is clear and symmetric.    Neck: Supple. Full ROM. No lymphadenopathy.  Cardiovascular: Regular rate. Regular rhythm. No murmurs, rubs, or gallops. Distal pulses are 2+ and symmetric.  Pulmonary/Chest: No respiratory distress. Wheezing diffusely. Crackles in left base.  Abdominal: Soft and non-distended.  There is no tenderness.  No rebound, guarding, or rigidity.   Musculoskeletal: Moves all extremities. No obvious deformities. No edema. No calf tenderness.  Skin: Warm and dry.  Neurological:  Alert, awake, and appropriate.  Normal speech.  No acute focal neurological deficits are appreciated.  Psychiatric: Normal affect. Good eye contact. Appropriate in content.    ED Course    Procedures  ED Vital Signs:  Vitals:    01/26/19 0211 01/26/19 0250 01/26/19 0255 01/26/19 0303   BP: (!) 161/89      Pulse: 95 88  "98 91   Resp: 14 18 18 20   Temp: 98.1 °F (36.7 °C)      SpO2: (!) 94% 95% 98% 98%   Height: 5' 4" (1.626 m)          Abnormal Lab Results:  Labs Reviewed   CBC W/ AUTO DIFFERENTIAL - Abnormal; Notable for the following components:       Result Value    RBC 3.48 (*)     Hematocrit 36.8 (*)      (*)     MCH 35.1 (*)     All other components within normal limits   COMPREHENSIVE METABOLIC PANEL - Abnormal; Notable for the following components:    Potassium 3.4 (*)     CO2 31 (*)     Glucose 193 (*)     Albumin 3.2 (*)     Anion Gap 6 (*)     All other components within normal limits   INFLUENZA A & B BY MOLECULAR        All Lab Results:  Results for orders placed or performed during the hospital encounter of 01/26/19   CBC auto differential   Result Value Ref Range    WBC 8.74 3.90 - 12.70 K/uL    RBC 3.48 (L) 4.00 - 5.40 M/uL    Hemoglobin 12.2 12.0 - 16.0 g/dL    Hematocrit 36.8 (L) 37.0 - 48.5 %     (H) 82 - 98 fL    MCH 35.1 (H) 27.0 - 31.0 pg    MCHC 33.2 32.0 - 36.0 g/dL    RDW 13.2 11.5 - 14.5 %    Platelets 286 150 - 350 K/uL    MPV 9.4 9.2 - 12.9 fL    Gran # (ANC) 5.7 1.8 - 7.7 K/uL    Lymph # 2.3 1.0 - 4.8 K/uL    Mono # 0.7 0.3 - 1.0 K/uL    Eos # 0.1 0.0 - 0.5 K/uL    Baso # 0.01 0.00 - 0.20 K/uL    Gran% 66.9 38.0 - 73.0 %    Lymph% 26.3 18.0 - 48.0 %    Mono% 7.6 4.0 - 15.0 %    Eosinophil% 0.7 0.0 - 8.0 %    Basophil% 0.1 0.0 - 1.9 %    Platelet Estimate Appears normal     Aniso Slight     Poik Slight     Poly Occasional     Target Cells Occasional     Tear Drop Cells Occasional     Basophilic Stippling Occasional     Differential Method Automated    Comprehensive metabolic panel   Result Value Ref Range    Sodium 141 136 - 145 mmol/L    Potassium 3.4 (L) 3.5 - 5.1 mmol/L    Chloride 104 95 - 110 mmol/L    CO2 31 (H) 23 - 29 mmol/L    Glucose 193 (H) 70 - 110 mg/dL    BUN, Bld 16 6 - 20 mg/dL    Creatinine 0.7 0.5 - 1.4 mg/dL    Calcium 8.8 8.7 - 10.5 mg/dL    Total Protein 6.4 6.0 - 8.4 " g/dL    Albumin 3.2 (L) 3.5 - 5.2 g/dL    Total Bilirubin 0.2 0.1 - 1.0 mg/dL    Alkaline Phosphatase 87 55 - 135 U/L    AST 10 10 - 40 U/L    ALT 12 10 - 44 U/L    Anion Gap 6 (L) 8 - 16 mmol/L    eGFR if African American >60 >60 mL/min/1.73 m^2    eGFR if non African American >60 >60 mL/min/1.73 m^2         Imaging Results:  Imaging Results          X-Ray Chest AP Portable (In process)                Per ED physician, pt's CXR results NAF.             The Emergency Provider reviewed the vital signs and test results, which are outlined above.    ED Discussion     3:34 AM: Reassessed pt at this time.  She is still wheezing and is tachypnic.     3:39 AM: Consult placed with hospital medicine.    3:49 AM: Discussed case with Tamar Portillo NP, (Hospital Medicine). Dr. Skaggs agrees with current care and management of pt and accepts admission.   Admitting Service: Hospital medicine   Admitting Physician: Giles  Admit to: Med-tele observation    3:50 AM: Re-evaluated pt. I have discussed test results, shared treatment plan, and the need for admission with patient and family at bedside. Pt and family express understanding at this time and agree with all information. All questions answered. Pt and family have no further questions or concerns at this time. Pt is ready for admit.    3:59 AM  Patient remains tachypneic in spite of several treatments.  She is also wheezing.  There are no retractions.  She also complains of back pain. I refer failed outpatient treatment, will admit for further treatment of her COPD exacerbation.  I have advised her to quit smoking.      ED Medication(s):  Medications   albuterol-ipratropium 2.5 mg-0.5 mg/3 mL nebulizer solution 3 mL (3 mLs Nebulization Given 1/26/19 0306)   predniSONE tablet 60 mg (60 mg Oral Given 1/26/19 0300)   terbutaline injection 0.25 mg (0.25 mg Subcutaneous Given 1/26/19 0936)   morphine injection 4 mg (4 mg Intravenous Given 1/26/19 4852)               Medical  Decision Making    Medical Decision Making:   Clinical Tests:   Lab Tests: Ordered and Reviewed  Radiological Study: Ordered and Reviewed           Scribe Attestation:   Scribe #1: I performed the above scribed service and the documentation accurately describes the services I performed. I attest to the accuracy of the note.    Attending:   Physician Attestation Statement for Scribe #1: I, Marv Landry Jr., MD, personally performed the services described in this documentation, as scribed by Elder Leon, in my presence, and it is both accurate and complete.          Clinical Impression     No diagnosis found.    Disposition:   Disposition: Placed in Observation  Condition: Fair         Marv Landry Jr., MD  01/26/19 0359       Marv Landry Jr., MD  01/26/19 0400

## 2019-01-27 NOTE — HOSPITAL COURSE
The patient was placed in Observation with a diagnosis of COPD exacerbation. She symptomatically improved overnight. The patient agrees to discharge with a steroid taper, empiric antibiotics and liberal use of her Duoneb treatments.

## 2019-01-27 NOTE — DISCHARGE SUMMARY
"[facility] Hospital Medicine  Discharge Summary      Patient Name: Sarah Monahan  MRN: [id number]  Admission Date: 1/26/2019  Hospital Length of Stay: 0 days  Discharge Date and Time:  01/26/2019 6:21 PM  Attending Physician: No att. providers found   Discharging Provider: CHLOÉ Haynes  Primary Care Provider: Lynette Yang MD      HPI:   Sarah Monahan is a 58-year-old  female with COPD, ongoing tobacco use, hypertension and chronic back pain who presents to [facility] ED complaining of worsening shortness of breath associated with wheezing and cough.  She was admitted at [facility] on 01/22/19, but left AMA because, "I got aggravated with a nurse." In the ED here, she was found to be profusely wheezing with a dry nonproductive cough. Oxygen saturations were 94% on 2 liters nasal cannula and chest x-ray revealed no evidence of infiltrates, masses or effusions, but changes consistent with emphysema. The patient was placed in Observation with a diagnosis of COPD exacerbation.    * No surgery found *      Hospital Course:   The patient was placed in Observation with a diagnosis of COPD exacerbation. She symptomatically improved overnight. The patient agrees to discharge with a steroid taper, empiric antibiotics and liberal use of her Duoneb treatments.       Final Active Diagnoses:    Diagnosis Date Noted POA    PRINCIPAL PROBLEM:  COPD with acute exacerbation [J44.1] 01/26/2019 Yes    Acute bronchitis [J20.9] 01/26/2019 Yes    Tobacco dependence [F17.200] 03/29/2017 Yes     Chronic    Low back pain [M54.5] 07/08/2015 Yes    Anxiety [F41.9] 07/21/2014 Yes      Problems Resolved During this Admission:       Discharged Condition: stable    Disposition: Home or Self Care    Follow Up:  Follow-up Information     Lynette Yang MD In 3 days.    Specialty:  Family Medicine  Contact information:  [address]  [facility] [id number] [phone]          "        Patient Instructions:      Diet Cardiac     Diet diabetic     Activity as tolerated       Significant Diagnostic Studies: Labs:   CMP   Recent Labs   Lab 01/26/19  0253 01/26/19  0520    140   K 3.4* 3.4*    103   CO2 31* 28   * 223*   BUN 16 14   CREATININE 0.7 0.7   CALCIUM 8.8 8.5*   PROT 6.4 6.6   ALBUMIN 3.2* 3.3*   BILITOT 0.2 0.2   ALKPHOS 87 86   AST 10 13   ALT 12 13   ANIONGAP 6* 9   ESTGFRAFRICA >60 >60   EGFRNONAA >60 >60   , CBC   Recent Labs   Lab 01/26/19  0253 01/26/19  0520   WBC 8.74 9.52   HGB 12.2 11.7*   HCT 36.8* 36.0*    285   , Troponin No results for input(s): TROPONINI in the last 168 hours. and All labs within the past 24 hours have been reviewed    Pending Diagnostic Studies:     None         Medications:  Reconciled Home Medications:      Medication List      START taking these medications    albuterol-ipratropium 2.5 mg-0.5 mg/3 mL nebulizer solution  Commonly known as:  DUO-NEB  Use 3-4 times daily for 3 days following discharge. Then use every 6 hours as needed for SOB and wheeze.     amoxicillin-clavulanate 875-125mg 875-125 mg per tablet  Commonly known as:  AUGMENTIN  Take 1 tablet by mouth every 12 (twelve) hours. for 5 days        CHANGE how you take these medications    predniSONE 20 MG tablet  Commonly known as:  DELTASONE  3 tabs for 3 days, 2 tabs for 3 days, 1 tab for 3 days, then a half tab for 3 days.  What changed:    · how much to take  · how to take this  · when to take this  · reasons to take this  · additional instructions  · Another medication with the same name was removed. Continue taking this medication, and follow the directions you see here.        CONTINUE taking these medications    albuterol 90 mcg/actuation inhaler  Commonly known as:  VENTOLIN HFA  Inhale 2 puffs into the lungs every 6 (six) hours as needed.     baclofen 10 MG tablet  Commonly known as:  LIORESAL  Take 10 mg by mouth nightly as needed.     blood sugar  diagnostic Strp  1 each.     blood-glucose meter Misc  Use to check blood sugar     clonazePAM 1 MG tablet  Commonly known as:  KLONOPIN  Take 1 mg by mouth every evening.     docusate sodium 100 MG capsule  Commonly known as:  COLACE  Take 1 capsule (100 mg total) by mouth 2 (two) times daily.     inhalation spacing device  Commonly known as:  VORTEX HOLDING CHAMBER  Use as directed for inhalation. For use with albuterol inhaler.     promethazine-codeine 6.25-10 mg/5 ml 6.25-10 mg/5 mL syrup  Commonly known as:  PHENERGAN with CODEINE  Take 5 mLs by mouth every 4 (four) hours as needed for Cough.     QVAR REDIHALER 80 mcg/actuation Hfab  Generic drug:  beclomethasone dipropionate  INHALE 2 PUFFS TWICE DAILY     ranitidine 150 MG tablet  Commonly known as:  ZANTAC  Take 300 mg by mouth nightly.     tiotropium-olodaterol 2.5-2.5 mcg/actuation Mist  Commonly known as:  STIOLTO RESPIMAT  Inhale 2 puffs into the lungs once daily.     traMADol 50 mg tablet  Commonly known as:  ULTRAM  TAKE ONE TABLET BY MOUTH EVERY 6 HOURS AS NEEDED FOR PAIN     varenicline 0.5 mg (11)- 1 mg (42) tablet  Commonly known as:  CHANTIX STARTING MONTH BOX  TAKE AS DIRECTED        STOP taking these medications    azithromycin 500 MG tablet  Commonly known as:  ZITHROMAX     promethazine-dextromethorphan 6.25-15 mg/5 mL Syrp  Commonly known as:  PROMETHAZINE-DM            Indwelling Lines/Drains at time of discharge:   Lines/Drains/Airways          None          Time spent on the discharge of patient: Greater than 30 minutes. Patient was seen and examined on the date of discharge and determined to be suitable for discharge.         CHLOÉ Haynes  Department of Hospital Medicine  [facility]

## 2019-05-08 DIAGNOSIS — J44.9 STAGE 3 SEVERE COPD BY GOLD CLASSIFICATION: Primary | ICD-10-CM

## 2019-05-08 RX ORDER — FLUTICASONE PROPIONATE 220 UG/1
1 AEROSOL, METERED RESPIRATORY (INHALATION) 2 TIMES DAILY
Qty: 12 G | Refills: 11 | Status: SHIPPED | OUTPATIENT
Start: 2019-05-08 | End: 2020-07-20 | Stop reason: SDUPTHER

## 2019-06-14 ENCOUNTER — OFFICE VISIT (OUTPATIENT)
Dept: PULMONOLOGY | Facility: CLINIC | Age: 59
End: 2019-06-14
Payer: MEDICAID

## 2019-06-14 ENCOUNTER — HOSPITAL ENCOUNTER (OUTPATIENT)
Dept: RADIOLOGY | Facility: HOSPITAL | Age: 59
Discharge: HOME | End: 2019-06-14
Attending: INTERNAL MEDICINE
Payer: MEDICAID

## 2019-06-14 ENCOUNTER — CLINICAL SUPPORT (OUTPATIENT)
Dept: PULMONOLOGY | Facility: CLINIC | Age: 59
End: 2019-06-14
Payer: MEDICAID

## 2019-06-14 VITALS
HEIGHT: 63 IN | RESPIRATION RATE: 18 BRPM | DIASTOLIC BLOOD PRESSURE: 66 MMHG | SYSTOLIC BLOOD PRESSURE: 120 MMHG | HEART RATE: 90 BPM | BODY MASS INDEX: 29.09 KG/M2 | WEIGHT: 164.19 LBS | OXYGEN SATURATION: 96 %

## 2019-06-14 DIAGNOSIS — R05.3 COUGH, PERSISTENT: ICD-10-CM

## 2019-06-14 DIAGNOSIS — Z99.81 HYPOXEMIA REQUIRING SUPPLEMENTAL OXYGEN: ICD-10-CM

## 2019-06-14 DIAGNOSIS — G47.33 OSA (OBSTRUCTIVE SLEEP APNEA): ICD-10-CM

## 2019-06-14 DIAGNOSIS — R09.02 HYPOXEMIA REQUIRING SUPPLEMENTAL OXYGEN: ICD-10-CM

## 2019-06-14 DIAGNOSIS — R05.3 CHRONIC COUGH: ICD-10-CM

## 2019-06-14 DIAGNOSIS — J44.9 STAGE 3 SEVERE COPD BY GOLD CLASSIFICATION: ICD-10-CM

## 2019-06-14 DIAGNOSIS — J44.9 STAGE 3 SEVERE COPD BY GOLD CLASSIFICATION: Primary | ICD-10-CM

## 2019-06-14 DIAGNOSIS — F17.200 TOBACCO DEPENDENCE: Chronic | ICD-10-CM

## 2019-06-14 PROBLEM — J44.1 COPD WITH ACUTE EXACERBATION: Status: RESOLVED | Noted: 2019-01-26 | Resolved: 2019-06-14

## 2019-06-14 PROBLEM — J20.9 ACUTE BRONCHITIS: Status: RESOLVED | Noted: 2019-01-26 | Resolved: 2019-06-14

## 2019-06-14 LAB
BRPFT: ABNORMAL
FEF 25 75 LLN: 1.23
FEF 25 75 PRE REF: 13 %
FEF 25 75 REF: 2.34
FEV1 FVC LLN: 68
FEV1 FVC PRE REF: 52.4 %
FEV1 FVC REF: 80
FEV1 LLN: 1.9
FEV1 PRE REF: 47.4 %
FEV1 REF: 2.49
FVC LLN: 2.4
FVC PRE REF: 89.9 %
FVC REF: 3.14
PEF LLN: 4.64
PEF PRE REF: 63 %
PEF REF: 6.29
PRE FEF 25 75: 0.3 L/S (ref 1.23–3.45)
PRE FET 100: 21.09 SEC
PRE FEV1 FVC: 41.81 % (ref 68.05–91.54)
PRE FEV1: 1.18 L (ref 1.9–3.08)
PRE FVC: 2.82 L (ref 2.4–3.87)
PRE PEF: 3.97 L/S (ref 4.64–7.95)

## 2019-06-14 PROCEDURE — 99214 OFFICE O/P EST MOD 30 MIN: CPT | Mod: 25,S$PBB,, | Performed by: INTERNAL MEDICINE

## 2019-06-14 PROCEDURE — 99999 PR PBB SHADOW E&M-EST. PATIENT-LVL III: CPT | Mod: PBBFAC,,, | Performed by: INTERNAL MEDICINE

## 2019-06-14 PROCEDURE — 99214 PR OFFICE/OUTPT VISIT, EST, LEVL IV, 30-39 MIN: ICD-10-PCS | Mod: 25,S$PBB,, | Performed by: INTERNAL MEDICINE

## 2019-06-14 PROCEDURE — 94375 RESPIRATORY FLOW VOLUME LOOP: CPT | Mod: 26,S$PBB,, | Performed by: INTERNAL MEDICINE

## 2019-06-14 PROCEDURE — 94375 RESPIRATORY FLOW VOLUME LOOP: CPT | Mod: PBBFAC

## 2019-06-14 PROCEDURE — 71046 X-RAY EXAM CHEST 2 VIEWS: CPT | Mod: 26,,, | Performed by: RADIOLOGY

## 2019-06-14 PROCEDURE — 94375 PR RESPIRATORY FLOW VOLUME LOOP: ICD-10-PCS | Mod: 26,S$PBB,, | Performed by: INTERNAL MEDICINE

## 2019-06-14 PROCEDURE — 71046 X-RAY EXAM CHEST 2 VIEWS: CPT | Mod: TC

## 2019-06-14 PROCEDURE — 94010 BREATHING CAPACITY TEST: CPT | Mod: PBBFAC

## 2019-06-14 PROCEDURE — 71046 XR CHEST PA AND LATERAL: ICD-10-PCS | Mod: 26,,, | Performed by: RADIOLOGY

## 2019-06-14 PROCEDURE — 99999 PR PBB SHADOW E&M-EST. PATIENT-LVL III: ICD-10-PCS | Mod: PBBFAC,,, | Performed by: INTERNAL MEDICINE

## 2019-06-14 PROCEDURE — 99213 OFFICE O/P EST LOW 20 MIN: CPT | Mod: PBBFAC,25 | Performed by: INTERNAL MEDICINE

## 2019-06-14 RX ORDER — NYSTATIN 100000 [USP'U]/ML
SUSPENSION ORAL
COMMUNITY
Start: 2018-12-20 | End: 2020-05-01

## 2019-06-14 RX ORDER — CLOBETASOL PROPIONATE 0.46 MG/ML
SOLUTION TOPICAL
COMMUNITY
Start: 2019-06-03 | End: 2019-09-06

## 2019-06-14 RX ORDER — HYDROCODONE BITARTRATE AND ACETAMINOPHEN 10; 325 MG/1; MG/1
TABLET ORAL
COMMUNITY
Start: 2019-05-29 | End: 2020-01-30 | Stop reason: SDUPTHER

## 2019-06-14 RX ORDER — ATORVASTATIN CALCIUM 40 MG/1
40 TABLET, FILM COATED ORAL
COMMUNITY
Start: 2019-03-18 | End: 2019-09-14

## 2019-06-14 RX ORDER — PROMETHAZINE HYDROCHLORIDE AND DEXTROMETHORPHAN HYDROBROMIDE 6.25; 15 MG/5ML; MG/5ML
SYRUP ORAL
COMMUNITY
End: 2019-11-14 | Stop reason: SDUPTHER

## 2019-06-14 RX ORDER — MONTELUKAST SODIUM 10 MG/1
10 TABLET ORAL
COMMUNITY
Start: 2019-04-08 | End: 2020-05-25 | Stop reason: SDUPTHER

## 2019-06-14 RX ORDER — PROMETHAZINE HYDROCHLORIDE AND CODEINE PHOSPHATE 6.25; 1 MG/5ML; MG/5ML
5 SOLUTION ORAL EVERY 4 HOURS PRN
Qty: 240 ML | Refills: 0 | Status: SHIPPED | OUTPATIENT
Start: 2019-06-14 | End: 2019-09-06 | Stop reason: SDUPTHER

## 2019-06-14 NOTE — PATIENT INSTRUCTIONS
Your provider has scheduled you for a sleep study.   You should be receiving a phone call from the sleep lab shortly after your study has been approved by your insurance. Please make sure you have your current phone numbers in the [facility]. If you do not hear from anyone in the next 10 business days, please call the sleep lab at [phone] to schedule your sleep study. The sleep studies are performed at [facility] seven nights a week.  When you are scheduling your sleep study, they will also make you a follow up appointment with your provider. This follow up appointment will be 10-14 days after your sleep study to review the results. If it is noted that you do have sleep apnea on your initial sleep study, you may receive a call back for a second night study with the CPAP before you come back to the office.

## 2019-06-14 NOTE — ASSESSMENT & PLAN NOTE
CAT score 14  mRC score 1- 2  Stiolto Respimat and FLOVENT, SINGULAR and albuterol  Chr Cough needing antitussives  FEV1 1.15 (47.4%)  Stable  Has been taking intermittent steriods  Recent bone density scan  Risk of steriods disucssed     Group D: High risk More symptoms

## 2019-06-14 NOTE — PROGRESS NOTES
Subjective:      Sarah Monahan is a 59 y.o. female   LV 12/10/2018  Severe COPD and night Oxygen, frequent exacerbation: last event: 6 weeks ago  In 2019 was in hospital [facility]  PCP Dr Yang  Main : smoking 2-3 PPD: domestic stressors: son in retirement  Sleeps poorly, fatigue, Daytime sleepiness, snores.  Previous PSG was -ve for DENISE and ABG not hypercapnia  Chronic use of antitussives codeine based  Here from meds refill and review for meds: STIOLTO respimat and FLOVENT.  VENTOLIN HFA.  Also has nebuliser: DUONEB  Current symptoms include: chronic dyspnea and cough productive of clear sputum in small amounts.   Symptoms began several months ago. Patient uses 2 pillows at night.   Patient currently is on oxygen at 2.0 L/min per nasal cannula. at night  Other immunizations are up-to-date  COPD test score is 14  COPD Group D : High risk More Symptoms  Pulmonary rehab offered,              The following portions of the patient's history were reviewed and updated as appropriate:   She  has a past medical history of Acid reflux, Anemia, Asthma, Bronchitis chronic, Diabetes mellitus, DVT, lower extremity, Emphysema of lung, Hepatitis B, Hepatitis C, Herniated disc, Hyperlipidemia, Kidney stone, Lung disease, Oxygen dependent, Supraventricular tachycardia, and Urinary tract infection.  She does not have any pertinent problems on file.  She  has a past surgical history that includes Cervical fusion; Cholecystectomy; Knee arthroscopy w/ ACL reconstruction; Hernia repair;  section, classic; Salpingectomy (Right); Cystoscopy w/ laser lithotripsy; abdominal laparotomy; orif knee (Right); and Breast surgery (Right).  Her family history includes Cancer in her maternal grandmother and mother; Diabetes in her father and paternal grandmother; Heart attack (age of onset: 60) in her father; Heart disease in her father; Heart failure in her paternal grandmother; Hypertension in her father.  She  reports that  she has been smoking.  She has a 40.00 pack-year smoking history. She has never used smokeless tobacco. She reports that she does not drink alcohol or use drugs.  She has a current medication list which includes the following prescription(s): albuterol, albuterol-ipratropium, atorvastatin, baclofen, blood sugar diagnostic, blood-glucose meter, clobetasol, clonazepam, docusate sodium, fluticasone propionate, hydrocodone-acetaminophen, inhalation spacing device, montelukast, nystatin, promethazine-codeine 6.25-10 mg/5 ml, promethazine-dextromethorphan, ranitidine, tiotropium-olodaterol, tramadol, varenicline, and prednisone.  Current Outpatient Medications on File Prior to Visit   Medication Sig Dispense Refill    albuterol (VENTOLIN HFA) 90 mcg/actuation inhaler Inhale 2 puffs into the lungs every 6 (six) hours as needed. 1 Inhaler 11    albuterol-ipratropium (DUO-NEB) 2.5 mg-0.5 mg/3 mL nebulizer solution Use 3-4 times daily for 3 days following discharge. Then use every 6 hours as needed for SOB and wheeze. 1 Box 0    atorvastatin (LIPITOR) 40 MG tablet Take 40 mg by mouth.      baclofen (LIORESAL) 10 MG tablet Take 10 mg by mouth nightly as needed.       blood sugar diagnostic Strp 1 each.      blood-glucose meter Misc Use to check blood sugar      clobetasol (TEMOVATE) 0.05 % external solution Apply topically.      clonazePAM (KLONOPIN) 1 MG tablet Take 1 mg by mouth every evening.       docusate sodium (COLACE) 100 MG capsule Take 1 capsule (100 mg total) by mouth 2 (two) times daily. 60 capsule 0    fluticasone propionate (FLOVENT HFA) 220 mcg/actuation inhaler Inhale 1 puff into the lungs 2 (two) times daily. Controller 12 g 11    HYDROcodone-acetaminophen (NORCO)  mg per tablet TAKE ONE TABLET BY MOUTH THREE TIMES DAILY AS NEEDED FOR PAIN (FILL ON OR AFTER 03/04/2019)      inhalation device (VORTEX HOLDING CHAMBER) Use as directed for inhalation. For use with albuterol inhaler. 1 Device prn  "   montelukast (SINGULAIR) 10 mg tablet Take 10 mg by mouth.      nystatin (MYCOSTATIN) 100,000 unit/mL suspension SWISH & SWALLOW 5 ML BY MOUTH  4 TIMES DAILY FOR 10 DAYS      promethazine-dextromethorphan (PROMETHAZINE-DM) 6.25-15 mg/5 mL Syrp promethazine-DM 6.25 mg-15 mg/5 mL oral syrup      ranitidine (ZANTAC) 150 MG tablet Take 300 mg by mouth nightly.       tiotropium-olodaterol (STIOLTO RESPIMAT) 2.5-2.5 mcg/actuation Mist Inhale 2 puffs into the lungs once daily. 4 g 11    tramadol (ULTRAM) 50 mg tablet TAKE ONE TABLET BY MOUTH EVERY 6 HOURS AS NEEDED FOR PAIN      varenicline (CHANTIX STARTING MONTH BOX) 0.5 mg (11)- 1 mg (42) tablet TAKE AS DIRECTED 53 tablet 0    [DISCONTINUED] promethazine-codeine 6.25-10 mg/5 ml (PHENERGAN WITH CODEINE) 6.25-10 mg/5 mL syrup Take 5 mLs by mouth every 4 (four) hours as needed for Cough. 240 mL 0    predniSONE (DELTASONE) 20 MG tablet 3 tabs for 3 days, 2 tabs for 3 days, 1 tab for 3 days, then a half tab for 3 days. 20 tablet 0     No current facility-administered medications on file prior to visit.      She is allergic to macrodantin [nitrofurantoin macrocrystalline]; nitrofurantoin; and nitrofurantoin monohyd/m-cryst..    Review of Systems   Constitutional: Positive for malaise/fatigue.   Respiratory: Positive for cough and shortness of breath.         SNORING   Genitourinary: Negative.    Musculoskeletal: Negative.    Neurological: Negative.    Psychiatric/Behavioral: Negative.    All other systems reviewed and are negative.           Objective:      /66   Pulse 90   Resp 18   Ht 5' 3" (1.6 m)   Wt 74.5 kg (164 lb 3.2 oz)   SpO2 96%   BMI 29.09 kg/m²      Physical Exam   Constitutional: She is oriented to person, place, and time. She appears well-developed and well-nourished.   HENT:   Head: Normocephalic and atraumatic.   Nose: Nose normal.   Mouth/Throat: Oropharynx is clear and moist. No oropharyngeal exudate.   Eyes: Pupils are equal, round, " and reactive to light. Conjunctivae and EOM are normal. Left eye exhibits no discharge.   Neck: Normal range of motion. Neck supple. No JVD present. No tracheal deviation present. No thyromegaly present.   Cardiovascular: Normal rate, regular rhythm and normal heart sounds.   No murmur heard.  Pulmonary/Chest: Effort normal and breath sounds normal. No stridor. No respiratory distress. She has no wheezes.   Abdominal: Soft. Bowel sounds are normal.   Musculoskeletal: Normal range of motion. She exhibits no edema.   Neurological: She is alert and oriented to person, place, and time. No cranial nerve deficit.   Skin: Skin is warm and dry. Capillary refill takes 2 to 3 seconds. No erythema.   Psychiatric: She has a normal mood and affect.   Nursing note and vitals reviewed.          Spirometry  Moderately severe obstructive defect  FEV1 1.18L (47.4% predicted) FVC 2.82 (89.9% predicted) FEV1/FVC was 42  FEV1 improved from 1.05 to 1.18  FVC improved from 2.65 to 2.82    CXR   FINDINGS:    FINDINGS:  Lungs are hyperexpanded.  There is scarring at the right lung base.  No focal consolidation.  Cardiomediastinal silhouette is not enlarged.  Postoperative changes of the lower cervical spine are noted.  There are multilevel degenerative changes of the thoracic spine.      Impression       Stable chest radiograph.  No acute findings           Assessment:     Problem List Items Addressed This Visit     Tobacco dependence (Chronic)     Smoking cessation  2-3 PPD  Low dose repeat in Sept 2019         Hypoxemia requiring supplemental oxygen     Night time 2 LPM  Previous PSG was -ve for DENISE  Snoring  Poor sleep  Repeat PSG         Relevant Orders    Polysomnogram (CPAP will be added if patient meets diagnostic criteria.)    Stage 3 severe COPD by GOLD classification - Primary     CAT score 14  mRC score 1- 2  Stiolto Respimat and  FLOVENT, SINGULAR and albuterol  Chr Cough needing antitussives  FEV1 1.15 (47.4%)  Stable  Has been  taking intermittent steriods  Recent bone density scan  Risk of steriods disucssed     Group D: High risk More symptoms         Relevant Orders    Polysomnogram (CPAP will be added if patient meets diagnostic criteria.)    Chronic cough     Refill antitussive           Other Visit Diagnoses     DENISE (obstructive sleep apnea)        Relevant Orders    Polysomnogram (CPAP will be added if patient meets diagnostic criteria.)    Cough, persistent        Relevant Medications    promethazine-codeine 6.25-10 mg/5 ml (PHENERGAN WITH CODEINE) 6.25-10 mg/5 mL syrup         Plan:         Flovent STILOTO  Group D : consider Pul rehab  FEV1  Meets transplant ref criteria, Active smoker  Repeat LDCT in Sept 2019  Risks of smoking discussed    Follow up in about 3 months (around 9/3/2019), or after sleep study.    This note was prepared using voice recognition system and is likely to have sound alike errors that may have been overlooked even after proof reading.  Please call me with any questions    Discussed diagnosis, its evaluation, treatment and usual course. All questions answered.    Thank you for the courtesy of participating in the care of this patient    David Toscano MD    Patient prescribed a controlled substance. Printed and signed copy of prescription given to patient.   Side effects reviewed in detail. Instructed not to combine with other controlled substances, alcohol or recreational drugs.   Habit forming, addictive potential of drug discussed. Advised not to operate a vehicle while taking this medication. Policy of limited refills discussed

## 2019-07-10 DIAGNOSIS — F17.200 TOBACCO DEPENDENCE: Chronic | ICD-10-CM

## 2019-07-11 RX ORDER — VARENICLINE TARTRATE 0.5 (11)-1
1 KIT ORAL 2 TIMES DAILY
Qty: 60 TABLET | Refills: 2 | Status: SHIPPED | OUTPATIENT
Start: 2019-07-11 | End: 2020-05-29

## 2019-08-01 ENCOUNTER — PATIENT MESSAGE (OUTPATIENT)
Dept: PULMONOLOGY | Facility: CLINIC | Age: 59
End: 2019-08-01

## 2019-08-01 DIAGNOSIS — J44.9 STAGE 3 SEVERE COPD BY GOLD CLASSIFICATION: ICD-10-CM

## 2019-08-15 DIAGNOSIS — J44.9 STAGE 3 SEVERE COPD BY GOLD CLASSIFICATION: ICD-10-CM

## 2019-09-03 ENCOUNTER — PATIENT MESSAGE (OUTPATIENT)
Dept: PULMONOLOGY | Facility: CLINIC | Age: 59
End: 2019-09-03

## 2019-09-06 ENCOUNTER — OFFICE VISIT (OUTPATIENT)
Dept: PULMONOLOGY | Facility: CLINIC | Age: 59
End: 2019-09-06
Payer: MEDICAID

## 2019-09-06 VITALS
SYSTOLIC BLOOD PRESSURE: 142 MMHG | HEART RATE: 105 BPM | BODY MASS INDEX: 27.85 KG/M2 | RESPIRATION RATE: 18 BRPM | WEIGHT: 163.13 LBS | OXYGEN SATURATION: 96 % | DIASTOLIC BLOOD PRESSURE: 76 MMHG | HEIGHT: 64 IN

## 2019-09-06 DIAGNOSIS — J44.1 COPD EXACERBATION: Primary | ICD-10-CM

## 2019-09-06 DIAGNOSIS — J44.9 STAGE 3 SEVERE COPD BY GOLD CLASSIFICATION: ICD-10-CM

## 2019-09-06 DIAGNOSIS — Z12.9 SCREENING FOR CANCER: ICD-10-CM

## 2019-09-06 DIAGNOSIS — R06.02 SOB (SHORTNESS OF BREATH): Primary | ICD-10-CM

## 2019-09-06 DIAGNOSIS — R05.3 CHRONIC COUGH: ICD-10-CM

## 2019-09-06 DIAGNOSIS — R05.3 COUGH, PERSISTENT: ICD-10-CM

## 2019-09-06 DIAGNOSIS — R09.02 HYPOXEMIA REQUIRING SUPPLEMENTAL OXYGEN: ICD-10-CM

## 2019-09-06 DIAGNOSIS — F17.200 TOBACCO DEPENDENCE: Chronic | ICD-10-CM

## 2019-09-06 DIAGNOSIS — Z99.81 HYPOXEMIA REQUIRING SUPPLEMENTAL OXYGEN: ICD-10-CM

## 2019-09-06 DIAGNOSIS — G47.00 INSOMNIA, UNSPECIFIED TYPE: ICD-10-CM

## 2019-09-06 DIAGNOSIS — M85.80 OSTEOPENIA, UNSPECIFIED LOCATION: ICD-10-CM

## 2019-09-06 PROCEDURE — 99999 PR PBB SHADOW E&M-EST. PATIENT-LVL V: CPT | Mod: PBBFAC,,, | Performed by: INTERNAL MEDICINE

## 2019-09-06 PROCEDURE — 99999 PR PBB SHADOW E&M-EST. PATIENT-LVL V: ICD-10-PCS | Mod: PBBFAC,,, | Performed by: INTERNAL MEDICINE

## 2019-09-06 PROCEDURE — 99214 PR OFFICE/OUTPT VISIT, EST, LEVL IV, 30-39 MIN: ICD-10-PCS | Mod: 25,S$PBB,, | Performed by: INTERNAL MEDICINE

## 2019-09-06 PROCEDURE — 99214 OFFICE O/P EST MOD 30 MIN: CPT | Mod: 25,S$PBB,, | Performed by: INTERNAL MEDICINE

## 2019-09-06 PROCEDURE — 99215 OFFICE O/P EST HI 40 MIN: CPT | Mod: PBBFAC | Performed by: INTERNAL MEDICINE

## 2019-09-06 RX ORDER — DOXYCYCLINE 100 MG/1
100 CAPSULE ORAL EVERY 12 HOURS
Qty: 28 CAPSULE | Refills: 0 | Status: SHIPPED | OUTPATIENT
Start: 2019-09-06 | End: 2019-09-20

## 2019-09-06 RX ORDER — VARENICLINE TARTRATE 0.5 (11)-1
KIT ORAL
COMMUNITY
Start: 2019-07-11 | End: 2020-05-29

## 2019-09-06 RX ORDER — OMEPRAZOLE 20 MG/1
20 CAPSULE, DELAYED RELEASE ORAL
COMMUNITY
Start: 2019-07-19 | End: 2020-01-15

## 2019-09-06 RX ORDER — TRIAMCINOLONE ACETONIDE 40 MG/ML
80 INJECTION, SUSPENSION INTRA-ARTICULAR; INTRAMUSCULAR
Status: COMPLETED | OUTPATIENT
Start: 2019-09-06 | End: 2019-09-06

## 2019-09-06 RX ORDER — PROMETHAZINE HYDROCHLORIDE AND CODEINE PHOSPHATE 6.25; 1 MG/5ML; MG/5ML
5 SOLUTION ORAL EVERY 4 HOURS PRN
Qty: 240 ML | Refills: 0 | Status: SHIPPED | OUTPATIENT
Start: 2019-09-06 | End: 2019-09-19 | Stop reason: SDUPTHER

## 2019-09-06 RX ORDER — BETAMETHASONE SODIUM PHOSPHATE AND BETAMETHASONE ACETATE 3; 3 MG/ML; MG/ML
6 INJECTION, SUSPENSION INTRA-ARTICULAR; INTRALESIONAL; INTRAMUSCULAR; SOFT TISSUE
COMMUNITY
Start: 2019-09-03 | End: 2020-05-01

## 2019-09-06 RX ADMIN — TRIAMCINOLONE ACETONIDE 80 MG: 400 INJECTION, SUSPENSION INTRA-ARTICULAR; INTRAMUSCULAR at 11:09

## 2019-09-06 NOTE — PROGRESS NOTES
Subjective:     Patient Active Problem List   Diagnosis    Hypoxemia requiring supplemental oxygen    Stage 3 severe COPD by GOLD classification    Insomnia    Depression    Osteopenia    Chest pain    Aortic atherosclerosis    Anxiety    Disc disorder of cervical region    Chronic pain    Low back pain    Disorder of intervertebral disc    Arthropathy of lumbar facet joint    Thoracic back pain    Osteoarthritis of thoracic spine    Tobacco dependence    Renal stone    Right ureteral stone    Chronic cough     Immunization History   Administered Date(s) Administered    Influenza 09/10/2018    Influenza - Quadrivalent 10/15/2014, 10/11/2016, 2017    Influenza - Quadrivalent - PF (6 months and older) 2017    Influenza - Quadrivalent - Recombinant - PF (egg allergy) 2018    Influenza - Trivalent (ADULT) 10/26/2005, 2006    Influenza - Trivalent - PF (ADULT) 10/26/2005, 2006    MMR 2000, 2010    Pneumococcal Conjugate - 13 Valent 2017    Pneumococcal Polysaccharide - 23 Valent 2005, 2010, 10/15/2014    Td (ADULT) 2000, 2016    Td - PF (ADULT) 2016    Tdap 2010    Tetanus 2016     Social History     Tobacco Use   Smoking Status Current Every Day Smoker    Packs/day: 1.00    Years: 40.00    Pack years: 40.00    Last attempt to quit: 2015    Years since quittin.2   Smokeless Tobacco Never Used   Tobacco Comment    no smoking after m.n prior to sx     COPD QUESTIONNAIRE  How often do you cough?: (P) All of the time  How often do you have phlegm (mucus) in your chest?: (P) All of the time  How often does your chest feel tight?: (P) Most of the time  When you walk up a hill or one flight of stairs, how often are you breathless?: (P) All of the time  How often are you limited doing any activities at home?: (P) All of the time  How often are you confident leaving the house despite your lung  condition?: (P) All of the time  How often do you sleep soundly?: (P) Never  How often do you have energy?: (P) Never  Total score: (P) 34      Sarah Andrae Monahan is a 59 y.o. female   LV 2019.  Seen primary care twice in the last 6 weeks for COPD exacerbation  Severe COPD home oxygen chronic cough tobacco dependency, osteopenia  Recently got steroid shot and is currently on prednisone 20 mg  Still coughing and short of breath the not use her nebulizer today  PCP Dr Yang  Main : smoking 2-3 PPD: domestic stressors: son in [employer]  Sleeps poorly, fatigue, Daytime sleepiness, snores.  Chronic use of antitussives codeine based  Here from meds refill and review for meds: STIOLTO respimat and FLOVENT.  VENTOLIN HFA.  Also has nebuliser: DUONEB  Current symptoms include: chronic dyspnea and cough productive of clear sputum in small amounts.   Symptoms began several months ago. Patient uses 2 pillows at night.   Patient currently is on oxygen at 2.0 L/min per nasal cannula. at night  Other immunizations are up-to-date  COPD test score is 15  COPD Group D : High risk More Symptoms  Did not perform CT scan due to his illness             The following portions of the patient's history were reviewed and updated as appropriate:   She  has a past medical history of Acid reflux, Anemia, Asthma, Bronchitis chronic, Diabetes mellitus, DVT, lower extremity, Emphysema of lung, Hepatitis B, Hepatitis C, Herniated disc, Hyperlipidemia, Kidney stone, Lung disease, Oxygen dependent, Supraventricular tachycardia, and Urinary tract infection.  She does not have any pertinent problems on file.  She  has a past surgical history that includes Cervical fusion; Cholecystectomy; Knee arthroscopy w/ ACL reconstruction; Hernia repair;  section, classic; Salpingectomy (Right); Cystoscopy w/ laser lithotripsy; abdominal laparotomy; orif knee (Right); and Breast surgery (Right).  Her family history includes Cancer in her  maternal grandmother and mother; Diabetes in her father and paternal grandmother; Heart attack (age of onset: 60) in her father; Heart disease in her father; Heart failure in her paternal grandmother; Hypertension in her father.  She  reports that she has been smoking.  She has a 40.00 pack-year smoking history. She has never used smokeless tobacco. She reports that she does not drink alcohol or use drugs.  She has a current medication list which includes the following prescription(s): albuterol, albuterol-ipratropium, atorvastatin, betamethasone acetate-betamethasone sodium phosphate, blood sugar diagnostic, blood-glucose meter, clonazepam, docusate sodium, fluticasone propionate, hydrocodone-acetaminophen, inhalation spacing device, montelukast, nystatin, omeprazole, prednisone, promethazine-codeine 6.25-10 mg/5 ml, promethazine-dextromethorphan, ranitidine, tiotropium-olodaterol, tramadol, varenicline, and doxycycline.  Current Outpatient Medications on File Prior to Visit   Medication Sig Dispense Refill    albuterol (VENTOLIN HFA) 90 mcg/actuation inhaler Inhale 2 puffs into the lungs every 6 (six) hours as needed. 1 Inhaler 11    albuterol-ipratropium (DUO-NEB) 2.5 mg-0.5 mg/3 mL nebulizer solution Use 3-4 times daily for 3 days following discharge. Then use every 6 hours as needed for SOB and wheeze. 1 Box 0    atorvastatin (LIPITOR) 40 MG tablet Take 40 mg by mouth.      betamethasone acetate-betamethasone sodium phosphate (CELESTONE SOLUSPAN) 6 mg/mL injection Inject 6 mg into the muscle.      blood sugar diagnostic Strp 1 each.      blood-glucose meter Misc Use to check blood sugar      clonazePAM (KLONOPIN) 1 MG tablet Take 1 mg by mouth every evening.       docusate sodium (COLACE) 100 MG capsule Take 1 capsule (100 mg total) by mouth 2 (two) times daily. 60 capsule 0    fluticasone propionate (FLOVENT HFA) 220 mcg/actuation inhaler Inhale 1 puff into the lungs 2 (two) times daily. Controller 12  g 11    HYDROcodone-acetaminophen (NORCO)  mg per tablet TAKE ONE TABLET BY MOUTH THREE TIMES DAILY AS NEEDED FOR PAIN (FILL ON OR AFTER 03/04/2019)      inhalation device (VORTEX HOLDING CHAMBER) Use as directed for inhalation. For use with albuterol inhaler. 1 Device prn    montelukast (SINGULAIR) 10 mg tablet Take 10 mg by mouth.      nystatin (MYCOSTATIN) 100,000 unit/mL suspension SWISH & SWALLOW 5 ML BY MOUTH  4 TIMES DAILY FOR 10 DAYS      omeprazole (PRILOSEC) 20 MG capsule Take 20 mg by mouth.      predniSONE (DELTASONE) 20 MG tablet 3 tabs for 3 days, 2 tabs for 3 days, 1 tab for 3 days, then a half tab for 3 days. 20 tablet 0    promethazine-dextromethorphan (PROMETHAZINE-DM) 6.25-15 mg/5 mL Syrp promethazine-DM 6.25 mg-15 mg/5 mL oral syrup      ranitidine (ZANTAC) 150 MG tablet Take 300 mg by mouth nightly.       tiotropium-olodaterol (STIOLTO RESPIMAT) 2.5-2.5 mcg/actuation Mist Inhale 2 puffs into the lungs once daily. 4 g 11    tramadol (ULTRAM) 50 mg tablet TAKE ONE TABLET BY MOUTH EVERY 6 HOURS AS NEEDED FOR PAIN      varenicline (CHANTIX STARTING MONTH BOX) 0.5 mg (11)- 1 mg (42) tablet TAKE 1 TABLET BY MOUTH TWICE DAILY. TAKE AS DIRECTED      [DISCONTINUED] baclofen (LIORESAL) 10 MG tablet Take 10 mg by mouth nightly as needed.       [DISCONTINUED] clobetasol (TEMOVATE) 0.05 % external solution Apply topically.      [DISCONTINUED] promethazine-codeine 6.25-10 mg/5 ml (PHENERGAN WITH CODEINE) 6.25-10 mg/5 mL syrup Take 5 mLs by mouth every 4 (four) hours as needed for Cough. 240 mL 0     No current facility-administered medications on file prior to visit.      She is allergic to macrodantin [nitrofurantoin macrocrystalline]; nitrofurantoin; nitrofurantoin macrocrystal; and nitrofurantoin monohyd/m-cryst..    Review of Systems   Constitutional: Positive for malaise/fatigue.   HENT: Negative.    Respiratory: Positive for cough, sputum production and shortness of breath.            "  Genitourinary: Negative.    Musculoskeletal: Negative.    Skin: Negative.    Neurological: Negative.    Psychiatric/Behavioral: Negative.    All other systems reviewed and are negative.           Objective:      BP (!) 142/76   Pulse 105   Resp 18   Ht 5' 4" (1.626 m)   Wt 74 kg (163 lb 2.3 oz)   SpO2 96%   BMI 28.00 kg/m²      Physical Exam   Constitutional: She is oriented to person, place, and time. She appears well-developed and well-nourished.   HENT:   Head: Normocephalic and atraumatic.   Nose: Nose normal.   Mouth/Throat: Oropharynx is clear and moist. No oropharyngeal exudate.   Eyes: Pupils are equal, round, and reactive to light. Conjunctivae and EOM are normal. Left eye exhibits no discharge.   Neck: Normal range of motion. Neck supple. No JVD present. No tracheal deviation present. No thyromegaly present.   Cardiovascular: Normal rate, regular rhythm and normal heart sounds.   No murmur heard.  Pulmonary/Chest: Effort normal. No stridor. No respiratory distress. She has wheezes.   Abdominal: Soft. Bowel sounds are normal.   Musculoskeletal: Normal range of motion. She exhibits no edema.   Neurological: She is alert and oriented to person, place, and time. No cranial nerve deficit.   Skin: Skin is warm and dry. Capillary refill takes 2 to 3 seconds. No erythema.   Psychiatric: She has a normal mood and affect.   Nursing note and vitals reviewed.              Assessment:     Problem List Items Addressed This Visit     Tobacco dependence (Chronic)    Hypoxemia requiring supplemental oxygen    Stage 3 severe COPD by GOLD classification    Relevant Medications    doxycycline (VIBRAMYCIN) 100 MG Cap    triamcinolone acetonide injection 80 mg (Completed)    Other Relevant Orders    Ambulatory referral to Rheumatology    Insomnia    Osteopenia    Relevant Orders    Ambulatory referral to Rheumatology    Chronic cough      Other Visit Diagnoses     COPD exacerbation    -  Primary    Relevant Medications "    doxycycline (VIBRAMYCIN) 100 MG Cap    triamcinolone acetonide injection 80 mg (Completed)    Screening for cancer        Relevant Orders    CT Chest Lung Screening Low Dose    Cough, persistent        Relevant Medications    promethazine-codeine 6.25-10 mg/5 ml (PHENERGAN WITH CODEINE) 6.25-10 mg/5 mL syrup         Plan:      Requested Prescriptions     Signed Prescriptions Disp Refills    doxycycline (VIBRAMYCIN) 100 MG Cap 28 capsule 0     Sig: Take 1 capsule (100 mg total) by mouth every 12 (twelve) hours. for 14 days    promethazine-codeine 6.25-10 mg/5 ml (PHENERGAN WITH CODEINE) 6.25-10 mg/5 mL syrup 240 mL 0     Sig: Take 5 mLs by mouth every 4 (four) hours as needed for Cough.     Orders Placed This Encounter   Procedures    CT Chest Lung Screening Low Dose     Schedule follow up office visit after test.     Standing Status:   Future     Standing Expiration Date:   3/6/2021    Ambulatory referral to Rheumatology     Referral Priority:   Routine     Referral Type:   Consultation     Referral Reason:   Specialty Services Required     Requested Specialty:   Rheumatology     Number of Visits Requested:   1          Flovent STILOTO  Group D : consider Pul rehab  FEV1  Meets transplant ref criteria, Active smoker  Repeat LDCT in Sept 2019  Risks of smoking discussed  Risk of chronic steroids including osteoporosis discussed    See rheumatology for Low bone density  Risk of c    Follow up in about 8 weeks (around 11/1/2019), or Kenalog shot,PO Doxy and refill cough meds, smoking cessation, Víctor next visit, for Spirometry, Kenalog given, Referal to Rheumatology.    This note was prepared using voice recognition system and is likely to have sound alike errors that may have been overlooked even after proof reading.  Please call me with any questions    Discussed diagnosis, its evaluation, treatment and usual course. All questions answered.    Thank you for the courtesy of participating in the care of this  patient    David Toscano MD    Patient prescribed a controlled substance. Printed and signed copy of prescription given to patient.   Side effects reviewed in detail. Instructed not to combine with other controlled substances, alcohol or recreational drugs.   Habit forming, addictive potential of drug discussed. Advised not to operate a vehicle while taking this medication. Policy of limited refills discussed

## 2019-09-09 DIAGNOSIS — J44.9 COPD, MODERATE: ICD-10-CM

## 2019-09-09 RX ORDER — ALBUTEROL SULFATE 90 UG/1
AEROSOL, METERED RESPIRATORY (INHALATION)
Qty: 18 EACH | Refills: 11 | Status: SHIPPED | OUTPATIENT
Start: 2019-09-09 | End: 2020-07-21 | Stop reason: SDUPTHER

## 2019-09-18 ENCOUNTER — CLINICAL SUPPORT (OUTPATIENT)
Dept: PULMONOLOGY | Facility: CLINIC | Age: 59
End: 2019-09-18
Payer: MEDICAID

## 2019-09-18 ENCOUNTER — HOSPITAL ENCOUNTER (OUTPATIENT)
Dept: RADIOLOGY | Facility: HOSPITAL | Age: 59
Discharge: HOME | End: 2019-09-18
Attending: INTERNAL MEDICINE
Payer: MEDICAID

## 2019-09-18 DIAGNOSIS — R06.02 SOB (SHORTNESS OF BREATH): ICD-10-CM

## 2019-09-18 DIAGNOSIS — Z12.9 SCREENING FOR CANCER: ICD-10-CM

## 2019-09-18 PROCEDURE — 94010 BREATHING CAPACITY TEST: CPT | Mod: 26,S$PBB,, | Performed by: INTERNAL MEDICINE

## 2019-09-18 PROCEDURE — G0297 LDCT FOR LUNG CA SCREEN: HCPCS | Mod: 26,,, | Performed by: RADIOLOGY

## 2019-09-18 PROCEDURE — 94010 BREATHING CAPACITY TEST: CPT | Mod: PBBFAC

## 2019-09-18 PROCEDURE — G0297 LDCT FOR LUNG CA SCREEN: HCPCS | Mod: TC

## 2019-09-18 PROCEDURE — G0297 CT CHEST LUNG SCREENING LOW DOSE: ICD-10-PCS | Mod: 26,,, | Performed by: RADIOLOGY

## 2019-09-18 PROCEDURE — 94010 BREATHING CAPACITY TEST: ICD-10-PCS | Mod: 26,S$PBB,, | Performed by: INTERNAL MEDICINE

## 2019-09-19 ENCOUNTER — OFFICE VISIT (OUTPATIENT)
Dept: SLEEP MEDICINE | Facility: CLINIC | Age: 59
End: 2019-09-19
Payer: MEDICAID

## 2019-09-19 VITALS
HEART RATE: 95 BPM | BODY MASS INDEX: 29.3 KG/M2 | WEIGHT: 165.38 LBS | HEIGHT: 63 IN | RESPIRATION RATE: 18 BRPM | SYSTOLIC BLOOD PRESSURE: 138 MMHG | OXYGEN SATURATION: 97 % | DIASTOLIC BLOOD PRESSURE: 50 MMHG

## 2019-09-19 DIAGNOSIS — R05.3 CHRONIC COUGH: ICD-10-CM

## 2019-09-19 DIAGNOSIS — R09.02 HYPOXEMIA REQUIRING SUPPLEMENTAL OXYGEN: ICD-10-CM

## 2019-09-19 DIAGNOSIS — J44.9 STAGE 3 SEVERE COPD BY GOLD CLASSIFICATION: Primary | ICD-10-CM

## 2019-09-19 DIAGNOSIS — F17.200 TOBACCO DEPENDENCE: Primary | Chronic | ICD-10-CM

## 2019-09-19 DIAGNOSIS — J44.9 STAGE 3 SEVERE COPD BY GOLD CLASSIFICATION: ICD-10-CM

## 2019-09-19 DIAGNOSIS — Z99.81 HYPOXEMIA REQUIRING SUPPLEMENTAL OXYGEN: ICD-10-CM

## 2019-09-19 DIAGNOSIS — R05.3 COUGH, PERSISTENT: ICD-10-CM

## 2019-09-19 DIAGNOSIS — Z12.9 SCREENING FOR CANCER: ICD-10-CM

## 2019-09-19 LAB
BRPFT: ABNORMAL
FEF 25 75 LLN: 1.22
FEF 25 75 PRE REF: 10.8 %
FEF 25 75 REF: 2.33
FEV1 FVC LLN: 68
FEV1 FVC PRE REF: 47.9 %
FEV1 FVC REF: 80
FEV1 LLN: 1.89
FEV1 PRE REF: 30.1 %
FEV1 REF: 2.48
FVC LLN: 2.39
FVC PRE REF: 62.6 %
FVC REF: 3.13
PEF LLN: 4.64
PEF PRE REF: 40.9 %
PEF REF: 6.29
PRE FEF 25 75: 0.25 L/S (ref 1.22–3.44)
PRE FET 100: 14.81 SEC
PRE FEV1 FVC: 38.18 % (ref 68–91.53)
PRE FEV1: 0.75 L (ref 1.89–3.07)
PRE FVC: 1.96 L (ref 2.39–3.87)
PRE PEF: 2.57 L/S (ref 4.64–7.95)

## 2019-09-19 PROCEDURE — 99999 PR PBB SHADOW E&M-EST. PATIENT-LVL IV: ICD-10-PCS | Mod: PBBFAC,,, | Performed by: INTERNAL MEDICINE

## 2019-09-19 PROCEDURE — 99214 PR OFFICE/OUTPT VISIT, EST, LEVL IV, 30-39 MIN: ICD-10-PCS | Mod: S$PBB,,, | Performed by: INTERNAL MEDICINE

## 2019-09-19 PROCEDURE — 99999 PR PBB SHADOW E&M-EST. PATIENT-LVL IV: CPT | Mod: PBBFAC,,, | Performed by: INTERNAL MEDICINE

## 2019-09-19 PROCEDURE — 99214 OFFICE O/P EST MOD 30 MIN: CPT | Mod: S$PBB,,, | Performed by: INTERNAL MEDICINE

## 2019-09-19 PROCEDURE — 99214 OFFICE O/P EST MOD 30 MIN: CPT | Mod: PBBFAC | Performed by: INTERNAL MEDICINE

## 2019-09-19 RX ORDER — POTASSIUM CHLORIDE 750 MG/1
10 TABLET, EXTENDED RELEASE ORAL 2 TIMES DAILY
Refills: 5 | COMMUNITY
Start: 2019-09-13 | End: 2021-10-15

## 2019-09-19 RX ORDER — METFORMIN HYDROCHLORIDE 750 MG/1
TABLET, EXTENDED RELEASE ORAL
Refills: 5 | COMMUNITY
Start: 2019-09-13

## 2019-09-19 RX ORDER — PROMETHAZINE HYDROCHLORIDE AND CODEINE PHOSPHATE 6.25; 1 MG/5ML; MG/5ML
5 SOLUTION ORAL EVERY 12 HOURS PRN
Qty: 240 ML | Refills: 0 | Status: SHIPPED | OUTPATIENT
Start: 2019-09-21 | End: 2020-05-07

## 2019-09-19 NOTE — PROGRESS NOTES
Subjective:     Patient Active Problem List   Diagnosis    Hypoxemia requiring supplemental oxygen    Stage 3 severe COPD by GOLD classification    Insomnia    Depression    Osteopenia    Chest pain    Aortic atherosclerosis    Anxiety    Disc disorder of cervical region    Chronic pain    Low back pain    Disorder of intervertebral disc    Arthropathy of lumbar facet joint    Thoracic back pain    Osteoarthritis of thoracic spine    Tobacco dependence    Renal stone    Right ureteral stone    Chronic cough     Immunization History   Administered Date(s) Administered    Influenza 09/10/2018    Influenza - Quadrivalent 10/15/2014, 10/11/2016, 2017    Influenza - Quadrivalent - PF (6 months and older) 2017    Influenza - Quadrivalent - Recombinant - PF (egg allergy) 2018    Influenza - Trivalent (ADULT) 10/26/2005, 2006    Influenza - Trivalent - PF (ADULT) 10/26/2005, 2006    MMR 2000, 2010    Pneumococcal Conjugate - 13 Valent 2017    Pneumococcal Polysaccharide - 23 Valent 2005, 2010, 10/15/2014    Td (ADULT) 2000, 2016    Td - PF (ADULT) 2016    Tdap 2010    Tetanus 2016     Social History     Tobacco Use   Smoking Status Current Every Day Smoker    Packs/day: 1.00    Years: 40.00    Pack years: 40.00    Last attempt to quit: 2015    Years since quittin.3   Smokeless Tobacco Never Used   Tobacco Comment    no smoking after m.n prior to sx     COPD Questionnaire  How often do you cough?: All of the time  How often do you have phlegm (mucus) in your chest?: All of the time  How often does your chest feel tight?: Some of the time  When you walk up a hill or one flight of stairs, how often are you breathless?: All of the time  How often are you limited doing any activities at home?: Most of the time  How often are you confident leaving the house despite your lung condition?: All of  the time  How often do you sleep soundly?: Never  How often do you have energy?: Never  Total score: 32 COPD Questionnaire  How often do you cough?: All of the time  How often do you have phlegm (mucus) in your chest?: All of the time  How often does your chest feel tight?: Some of the time  When you walk up a hill or one flight of stairs, how often are you breathless?: All of the time  How often are you limited doing any activities at home?: Most of the time  How often are you confident leaving the house despite your lung condition?: All of the time  How often do you sleep soundly?: Never  How often do you have energy?: Never  Total score: 32        Sarah Monahan is a 59 y.o. female   LV 09/06/2019.  Here to review chest CT scan and spirometry  Still recovering from COPD exacerbation.  Still taking prednisone  MRC score 1 to 2  Severe COPD home oxygen chronic cough tobacco dependency, osteopenia  Stable on her current regimen  Smoking triggers discussed:  Son in [facility], domestic issues  Chest CT scan does not show any new nodules  Reviewed spirometry which still shows severe COPD with an FEV1 of 30.1%.  Patient has for refill for cough medication  Limit refill electronically sent to her pharmacy  Patient has had prior sleep study which was negative  Her meds refill and review for meds: STIOLTO respimat and FLOVENT.  VENTOLIN HFA.  Also has nebuliser: DUONEB  COPD Group D : High risk More Symptoms  Patient informed that tobacco abuse is a reversible risk factor for lung cancer causation             The following portions of the patient's history were reviewed and updated as appropriate:   She  has a past medical history of Acid reflux, Anemia, Asthma, Bronchitis chronic, Diabetes mellitus, DVT, lower extremity, Emphysema of lung, Hepatitis B, Hepatitis C, Herniated disc, Hyperlipidemia, Kidney stone, Lung disease, Oxygen dependent, Supraventricular tachycardia, and Urinary tract infection.  She does not  have any pertinent problems on file.  She  has a past surgical history that includes Cervical fusion; Cholecystectomy; Knee arthroscopy w/ ACL reconstruction; Hernia repair;  section, classic; Salpingectomy (Right); Cystoscopy w/ laser lithotripsy; abdominal laparotomy; orif knee (Right); and Breast surgery (Right).  Her family history includes Cancer in her maternal grandmother and mother; Diabetes in her father and paternal grandmother; Heart attack (age of onset: 60) in her father; Heart disease in her father; Heart failure in her paternal grandmother; Hypertension in her father.  She  reports that she has been smoking.  She has a 40.00 pack-year smoking history. She has never used smokeless tobacco. She reports that she does not drink alcohol or use drugs.  She has a current medication list which includes the following prescription(s): albuterol, albuterol-ipratropium, betamethasone acetate-betamethasone sodium phosphate, blood sugar diagnostic, blood-glucose meter, clonazepam, docusate sodium, doxycycline, fluticasone propionate, hydrocodone-acetaminophen, inhalation spacing device, montelukast, nystatin, omeprazole, prednisone, promethazine-codeine 6.25-10 mg/5 ml, promethazine-dextromethorphan, ranitidine, tiotropium-olodaterol, tramadol, varenicline, atorvastatin, metformin, and potassium chloride.  Current Outpatient Medications on File Prior to Visit   Medication Sig Dispense Refill    albuterol (PROVENTIL/VENTOLIN HFA) 90 mcg/actuation inhaler INHALE TWO PUFFS BY MOUTH EVERY 6 HOURS AS NEEDED 18 each 11    albuterol-ipratropium (DUO-NEB) 2.5 mg-0.5 mg/3 mL nebulizer solution Use 3-4 times daily for 3 days following discharge. Then use every 6 hours as needed for SOB and wheeze. 1 Box 0    betamethasone acetate-betamethasone sodium phosphate (CELESTONE SOLUSPAN) 6 mg/mL injection Inject 6 mg into the muscle.      blood sugar diagnostic Strp 1 each.      blood-glucose meter Misc Use to check  blood sugar      clonazePAM (KLONOPIN) 1 MG tablet Take 1 mg by mouth every evening.       docusate sodium (COLACE) 100 MG capsule Take 1 capsule (100 mg total) by mouth 2 (two) times daily. 60 capsule 0    doxycycline (VIBRAMYCIN) 100 MG Cap Take 1 capsule (100 mg total) by mouth every 12 (twelve) hours. for 14 days 28 capsule 0    fluticasone propionate (FLOVENT HFA) 220 mcg/actuation inhaler Inhale 1 puff into the lungs 2 (two) times daily. Controller 12 g 11    HYDROcodone-acetaminophen (NORCO)  mg per tablet TAKE ONE TABLET BY MOUTH THREE TIMES DAILY AS NEEDED FOR PAIN (FILL ON OR AFTER 03/04/2019)      inhalation device (varinode HOLDING CHAMBER) Use as directed for inhalation. For use with albuterol inhaler. 1 Device prn    montelukast (SINGULAIR) 10 mg tablet Take 10 mg by mouth.      nystatin (MYCOSTATIN) 100,000 unit/mL suspension SWISH & SWALLOW 5 ML BY MOUTH  4 TIMES DAILY FOR 10 DAYS      omeprazole (PRILOSEC) 20 MG capsule Take 20 mg by mouth.      predniSONE (DELTASONE) 20 MG tablet 3 tabs for 3 days, 2 tabs for 3 days, 1 tab for 3 days, then a half tab for 3 days. 20 tablet 0    promethazine-dextromethorphan (PROMETHAZINE-DM) 6.25-15 mg/5 mL Syrp promethazine-DM 6.25 mg-15 mg/5 mL oral syrup      ranitidine (ZANTAC) 150 MG tablet Take 300 mg by mouth nightly.       tiotropium-olodaterol (STIOLTO RESPIMAT) 2.5-2.5 mcg/actuation Mist Inhale 2 puffs into the lungs once daily. 4 g 11    tramadol (ULTRAM) 50 mg tablet TAKE ONE TABLET BY MOUTH EVERY 6 HOURS AS NEEDED FOR PAIN      varenicline (CHANTIX STARTING MONTH BOX) 0.5 mg (11)- 1 mg (42) tablet TAKE 1 TABLET BY MOUTH TWICE DAILY. TAKE AS DIRECTED      [DISCONTINUED] promethazine-codeine 6.25-10 mg/5 ml (PHENERGAN WITH CODEINE) 6.25-10 mg/5 mL syrup Take 5 mLs by mouth every 4 (four) hours as needed for Cough. 240 mL 0    atorvastatin (LIPITOR) 40 MG tablet Take 40 mg by mouth.      metFORMIN (GLUCOPHAGE-XR) 750 MG 24 hr tablet  "TAKE ONE TABLET BY MOUTH DAILY WITH DINNER  5    potassium chloride (KLOR-CON) 10 MEQ TbSR Take 10 mEq by mouth 2 (two) times daily.  5     No current facility-administered medications on file prior to visit.      She is allergic to macrodantin [nitrofurantoin macrocrystalline]; nitrofurantoin; nitrofurantoin macrocrystal; and nitrofurantoin monohyd/m-cryst..    Review of Systems   Constitutional: Positive for malaise/fatigue.   HENT: Negative.    Respiratory: Positive for shortness of breath. Negative for cough and sputum production.             Genitourinary: Negative.    Musculoskeletal: Negative.    Skin: Negative.    Neurological: Negative.    Psychiatric/Behavioral: Negative.    All other systems reviewed and are negative.           Objective:      BP (!) 138/50   Pulse 95   Resp 18   Ht 5' 3" (1.6 m)   Wt 75 kg (165 lb 5.5 oz)   SpO2 97%   BMI 29.29 kg/m²      Physical Exam   Constitutional: She is oriented to person, place, and time. She appears well-developed and well-nourished.   HENT:   Head: Normocephalic and atraumatic.   Nose: Nose normal.   Mouth/Throat: Oropharynx is clear and moist. No oropharyngeal exudate.   Eyes: Pupils are equal, round, and reactive to light. Conjunctivae and EOM are normal. Left eye exhibits no discharge.   Neck: Normal range of motion. Neck supple. No JVD present. No tracheal deviation present. No thyromegaly present.   Cardiovascular: Normal rate, regular rhythm and normal heart sounds.   No murmur heard.  Pulmonary/Chest: Effort normal. No stridor. No respiratory distress. She has no wheezes.   Scattered wheezes   Abdominal: Soft. Bowel sounds are normal.   Musculoskeletal: Normal range of motion. She exhibits no edema.   Neurological: She is alert and oriented to person, place, and time. No cranial nerve deficit.   Skin: Skin is warm and dry. Capillary refill takes 2 to 3 seconds. No erythema.   Psychiatric: She has a normal mood and affect.   Nursing note and vitals " reviewed.        Spirometry reviewed  FEV1 0.75 L (30.1% predicted) FVC 1.96 L (62.6% predicted) FEV1/FVC 38.  Low-dose screening CT scan reviewed with patient  FINDINGS:  Lungs: There are no abnormal opacities that require further evaluation.  3 mm right upper lobe pulmonary nodule present, sequence 4 image 134. Punctate calcific ground granuloma present within the left lower lobe.  The lungs show findings consistent with emphysema.    Pleura:   No effusion..    Heart and pericardium: Normal size without effusion.    Aorta and vasculature: Atherosclerosis including coronary arteries.    Chest wall and skeletal structures: Unremarkable except age-appropriate degenerative changes.    Upper abdomen: Unremarkable.      Impression       Lung-RADS Category:  2 - Benign Appearance or Behavior - continue annual screening with LDCT in 12 months.    Clinically or potentially clinically significant non lung cancer finding:  None.    Prior Lung Cancer Modifier:  No history of prior lung cancer.           Assessment:     Problem List Items Addressed This Visit     Tobacco dependence - Primary (Chronic)    Relevant Orders    CT Chest Lung Screening Low Dose    Hypoxemia requiring supplemental oxygen    Stage 3 severe COPD by GOLD classification    Relevant Orders    X-Ray Chest PA And Lateral    Spirometry without Bronchodilator    Chronic cough      Other Visit Diagnoses     Screening for cancer        Relevant Orders    CT Chest Lung Screening Low Dose    Cough, persistent        Relevant Medications    promethazine-codeine 6.25-10 mg/5 ml (PHENERGAN WITH CODEINE) 6.25-10 mg/5 mL syrup (Start on 9/21/2019)         Plan:      Requested Prescriptions     Signed Prescriptions Disp Refills    promethazine-codeine 6.25-10 mg/5 ml (PHENERGAN WITH CODEINE) 6.25-10 mg/5 mL syrup 240 mL 0     Sig: Take 5 mLs by mouth every 12 (twelve) hours as needed for Cough.     Orders Placed This Encounter   Procedures    CT Chest Lung Screening  Low Dose     Schedule follow up office visit after test.     Standing Status:   Future     Standing Expiration Date:   3/19/2021    X-Ray Chest PA And Lateral     Standing Status:   Future     Standing Expiration Date:   9/18/2020    Spirometry without Bronchodilator     Standing Status:   Future     Standing Expiration Date:   9/18/2020          Flovent ANNMARIEO  Group D : consider Pul rehab  FEV1  Meets transplant ref criteria, Active smoker current Barrier  Repeat LDCT in Sept 2020  Risks of smoking discussed  Risk of chronic steroids including osteoporosis discussed  See rheumatology for Low bone density      Follow up in about 3 months (around 12/19/2019), or with [place], CXR and [place], Low Dose CT in 12  months.    This note was prepared using voice recognition system and is likely to have sound alike errors that may have been overlooked even after proof reading.  Please call me with any questions    Discussed diagnosis, its evaluation, treatment and usual course. All questions answered.    Thank you for the courtesy of participating in the care of this patient    David Toscano MD    Patient prescribed a controlled substance. Printed and signed copy of prescription given to patient.   Side effects reviewed in detail. Instructed not to combine with other controlled substances, alcohol or recreational drugs.   Habit forming, addictive potential of drug discussed. Advised not to operate a vehicle while taking this medication. Policy of limited refills discussed

## 2019-10-01 ENCOUNTER — TELEPHONE (OUTPATIENT)
Dept: RHEUMATOLOGY | Facility: CLINIC | Age: 59
End: 2019-10-01

## 2019-10-02 DIAGNOSIS — R05.3 COUGH, PERSISTENT: ICD-10-CM

## 2019-10-02 RX ORDER — PROMETHAZINE HYDROCHLORIDE AND CODEINE PHOSPHATE 6.25; 1 MG/5ML; MG/5ML
SOLUTION ORAL
Qty: 240 ML | OUTPATIENT
Start: 2019-10-02

## 2019-11-14 ENCOUNTER — PATIENT MESSAGE (OUTPATIENT)
Dept: SLEEP MEDICINE | Facility: CLINIC | Age: 59
End: 2019-11-14

## 2019-11-14 DIAGNOSIS — R05.3 CHRONIC COUGHING: Primary | ICD-10-CM

## 2019-11-14 RX ORDER — PROMETHAZINE HYDROCHLORIDE AND DEXTROMETHORPHAN HYDROBROMIDE 6.25; 15 MG/5ML; MG/5ML
SYRUP ORAL
Qty: 240 ML | Refills: 0 | Status: SHIPPED | OUTPATIENT
Start: 2019-11-14 | End: 2019-12-12 | Stop reason: SDUPTHER

## 2019-12-04 ENCOUNTER — PATIENT MESSAGE (OUTPATIENT)
Dept: PULMONOLOGY | Facility: CLINIC | Age: 59
End: 2019-12-04

## 2019-12-04 ENCOUNTER — TELEPHONE (OUTPATIENT)
Dept: PULMONOLOGY | Facility: CLINIC | Age: 59
End: 2019-12-04

## 2019-12-04 DIAGNOSIS — R05.3 CHRONIC COUGHING: ICD-10-CM

## 2019-12-04 RX ORDER — PROMETHAZINE HYDROCHLORIDE AND DEXTROMETHORPHAN HYDROBROMIDE 6.25; 15 MG/5ML; MG/5ML
SYRUP ORAL
Qty: 240 ML | Refills: 0 | OUTPATIENT
Start: 2019-12-04

## 2019-12-04 NOTE — TELEPHONE ENCOUNTER
----- Message from Carol Ann Crouch sent at 12/4/2019 10:29 AM CST -----  Contact: Pt  Type:  Patient Returning Call    Who Called:PT  Who Left Message for Patient:CATARINA  Does the patient know what this is regarding?:YES  Would the patient rather a call back or a response via [facility]? CALL BACK  Best Call Back Number:[phone]  Additional Information:

## 2019-12-04 NOTE — TELEPHONE ENCOUNTER
----- Message from Tanya Pleitez sent at 12/4/2019  9:29 AM CST -----  Contact: pt   Pt states that she need to speak to the nurse regarding her upcoming appt. Pt states that it very important.   .[phone] (home)

## 2019-12-12 ENCOUNTER — PATIENT MESSAGE (OUTPATIENT)
Dept: SLEEP MEDICINE | Facility: CLINIC | Age: 59
End: 2019-12-12

## 2019-12-12 DIAGNOSIS — R05.3 CHRONIC COUGHING: ICD-10-CM

## 2019-12-12 RX ORDER — PROMETHAZINE HYDROCHLORIDE AND DEXTROMETHORPHAN HYDROBROMIDE 6.25; 15 MG/5ML; MG/5ML
SYRUP ORAL
Qty: 240 ML | Refills: 0 | Status: SHIPPED | OUTPATIENT
Start: 2019-12-12 | End: 2020-05-07

## 2019-12-13 ENCOUNTER — TELEPHONE (OUTPATIENT)
Dept: PULMONOLOGY | Facility: CLINIC | Age: 59
End: 2019-12-13

## 2019-12-13 NOTE — TELEPHONE ENCOUNTER
----- Message from Long Gomez sent at 12/13/2019 11:26 AM CST -----  Contact: [facility] [phone]  Type:  Pharmacy Calling to Clarify an RX    Name of Caller: Ria  Pharmacy Name: [facility]  Prescription Name: promethazine-dextromethorphan (PROMETHAZINE-DM) 6.25-15 mg/5 mL Syrp  What do they need to clarify?: The rx was sent without insturctions  Best Call Back Number: [phone]  Additional Information:   [facility] [place], [place] - [address]  [address] [facility] [address]  Phone: [phone] Fax: [phone]

## 2019-12-18 ENCOUNTER — PATIENT MESSAGE (OUTPATIENT)
Dept: PULMONOLOGY | Facility: CLINIC | Age: 59
End: 2019-12-18

## 2020-01-15 ENCOUNTER — PATIENT MESSAGE (OUTPATIENT)
Dept: SLEEP MEDICINE | Facility: CLINIC | Age: 60
End: 2020-01-15

## 2020-01-15 ENCOUNTER — OFFICE VISIT (OUTPATIENT)
Dept: SLEEP MEDICINE | Facility: CLINIC | Age: 60
End: 2020-01-15
Payer: MEDICAID

## 2020-01-15 ENCOUNTER — HOSPITAL ENCOUNTER (OUTPATIENT)
Dept: RADIOLOGY | Facility: HOSPITAL | Age: 60
Discharge: HOME | End: 2020-01-15
Attending: INTERNAL MEDICINE
Payer: MEDICAID

## 2020-01-15 ENCOUNTER — CLINICAL SUPPORT (OUTPATIENT)
Dept: PULMONOLOGY | Facility: CLINIC | Age: 60
End: 2020-01-15
Payer: MEDICAID

## 2020-01-15 VITALS
WEIGHT: 163.56 LBS | HEIGHT: 64 IN | SYSTOLIC BLOOD PRESSURE: 126 MMHG | HEART RATE: 107 BPM | BODY MASS INDEX: 27.92 KG/M2 | OXYGEN SATURATION: 93 % | RESPIRATION RATE: 16 BRPM | DIASTOLIC BLOOD PRESSURE: 78 MMHG

## 2020-01-15 DIAGNOSIS — Z99.81 HYPOXEMIA REQUIRING SUPPLEMENTAL OXYGEN: ICD-10-CM

## 2020-01-15 DIAGNOSIS — F17.200 TOBACCO DEPENDENCE: Chronic | ICD-10-CM

## 2020-01-15 DIAGNOSIS — F51.01 PRIMARY INSOMNIA: ICD-10-CM

## 2020-01-15 DIAGNOSIS — J44.9 STAGE 3 SEVERE COPD BY GOLD CLASSIFICATION: ICD-10-CM

## 2020-01-15 DIAGNOSIS — R05.3 CHRONIC COUGHING: ICD-10-CM

## 2020-01-15 DIAGNOSIS — J44.9 STAGE 3 SEVERE COPD BY GOLD CLASSIFICATION: Primary | ICD-10-CM

## 2020-01-15 DIAGNOSIS — R05.3 CHRONIC COUGH: ICD-10-CM

## 2020-01-15 DIAGNOSIS — R09.02 HYPOXEMIA REQUIRING SUPPLEMENTAL OXYGEN: ICD-10-CM

## 2020-01-15 LAB
BRPFT: ABNORMAL
FEF 25 75 LLN: 1.21
FEF 25 75 PRE REF: 13.6 %
FEF 25 75 REF: 2.32
FEV1 FVC LLN: 68
FEV1 FVC PRE REF: 56.4 %
FEV1 FVC REF: 80
FEV1 LLN: 1.88
FEV1 PRE REF: 27.7 %
FEV1 REF: 2.47
FVC LLN: 2.38
FVC PRE REF: 48.7 %
FVC REF: 3.12
PEF LLN: 4.64
PEF PRE REF: 32.9 %
PEF REF: 6.29
PRE FEF 25 75: 0.31 L/S (ref 1.21–3.42)
PRE FET 100: 7.06 SEC
PRE FEV1 FVC: 44.97 % (ref 67.93–91.53)
PRE FEV1: 0.68 L (ref 1.88–3.06)
PRE FVC: 1.52 L (ref 2.38–3.86)
PRE PEF: 2.07 L/S (ref 4.64–7.95)

## 2020-01-15 PROCEDURE — 94010 BREATHING CAPACITY TEST: CPT | Mod: PBBFAC

## 2020-01-15 PROCEDURE — 71046 X-RAY EXAM CHEST 2 VIEWS: CPT | Mod: TC

## 2020-01-15 PROCEDURE — 99214 OFFICE O/P EST MOD 30 MIN: CPT | Mod: 25,S$PBB,, | Performed by: NURSE PRACTITIONER

## 2020-01-15 PROCEDURE — 94010 BREATHING CAPACITY TEST: ICD-10-PCS | Mod: 26,S$PBB,, | Performed by: INTERNAL MEDICINE

## 2020-01-15 PROCEDURE — 71046 X-RAY EXAM CHEST 2 VIEWS: CPT | Mod: 26,,, | Performed by: RADIOLOGY

## 2020-01-15 PROCEDURE — 99999 PR PBB SHADOW E&M-EST. PATIENT-LVL V: CPT | Mod: PBBFAC,,, | Performed by: NURSE PRACTITIONER

## 2020-01-15 PROCEDURE — 99999 PR PBB SHADOW E&M-EST. PATIENT-LVL V: ICD-10-PCS | Mod: PBBFAC,,, | Performed by: NURSE PRACTITIONER

## 2020-01-15 PROCEDURE — 94010 BREATHING CAPACITY TEST: CPT | Mod: 26,S$PBB,, | Performed by: INTERNAL MEDICINE

## 2020-01-15 PROCEDURE — 99214 PR OFFICE/OUTPT VISIT, EST, LEVL IV, 30-39 MIN: ICD-10-PCS | Mod: 25,S$PBB,, | Performed by: NURSE PRACTITIONER

## 2020-01-15 PROCEDURE — 99215 OFFICE O/P EST HI 40 MIN: CPT | Mod: PBBFAC,25 | Performed by: NURSE PRACTITIONER

## 2020-01-15 PROCEDURE — 71046 XR CHEST PA AND LATERAL: ICD-10-PCS | Mod: 26,,, | Performed by: RADIOLOGY

## 2020-01-15 RX ORDER — VARENICLINE TARTRATE 0.5 (11)-1
KIT ORAL
Qty: 1 PACKAGE | Refills: 0 | Status: SHIPPED | OUTPATIENT
Start: 2020-01-15 | End: 2020-05-29

## 2020-01-15 RX ORDER — PREDNISONE 20 MG/1
TABLET ORAL
Qty: 21 TABLET | Refills: 0 | Status: SHIPPED | OUTPATIENT
Start: 2020-01-15 | End: 2020-02-26 | Stop reason: SDUPTHER

## 2020-01-15 NOTE — PROGRESS NOTES
"Subjective:      Patient ID: Sarah Monahan is a 59 y.o. female.    Chief Complaint: Cough (x 2 weeks non productive) and COPD (rev chest x ray sprio)    HPI  Patient with COPD presents to the office today for follow-up.  Patient continues to smoke.  We will send in a prescription for Chantix starter pack.  We will place a referral to smoking cessation program today.  She has chronic wheezing and chronic cough secondary to her COPD and continual tobacco abuse.  Her inhalers include Stiolto and Flovent.    No fever, chills, hemoptysis.      Patient Active Problem List   Diagnosis    Hypoxemia requiring supplemental oxygen    Stage 3 severe COPD by GOLD classification    Insomnia    Depression    Osteopenia    Chest pain    Aortic atherosclerosis    Anxiety    Disc disorder of cervical region    Chronic pain    Low back pain    Disorder of intervertebral disc    Arthropathy of lumbar facet joint    Thoracic back pain    Osteoarthritis of thoracic spine    Tobacco dependence    Renal stone    Right ureteral stone    Chronic cough       /78   Pulse 107   Resp 16   Ht 5' 4" (1.626 m)   Wt 74.2 kg (163 lb 9.3 oz)   SpO2 (!) 93%   BMI 28.08 kg/m²   Body mass index is 28.08 kg/m².    Review of Systems   Respiratory: Positive for cough and wheezing.    Musculoskeletal: Positive for arthralgias.   All other systems reviewed and are negative.    Objective:      Physical Exam   Constitutional: She is oriented to person, place, and time. She appears well-developed and well-nourished.   HENT:   Head: Normocephalic and atraumatic.   Neck: Normal range of motion. Neck supple.   Cardiovascular: Normal rate and regular rhythm.   Pulmonary/Chest: Effort normal. She has wheezes.   Abdominal: Soft.   Musculoskeletal: Normal range of motion. She exhibits no edema.   Neurological: She is alert and oriented to person, place, and time.   Skin: Skin is warm and dry.   Psychiatric: She has a normal mood " and affect.     Personal Diagnostic Review  Spirometry reviewed. FEV1 27 % of predicted.      X-Ray Chest PA And Lateral  Narrative: EXAMINATION:  XR CHEST PA AND LATERAL    CLINICAL HISTORY:  Chronic obstructive pulmonary disease, unspecified    TECHNIQUE:  PA and lateral views of the chest were performed.    COMPARISON:  06/14/2019    FINDINGS:  The cardiac and mediastinal silhouettes appear within normal limits.   The lungs are clear bilaterally.  No acute osseous findings demonstrated.  Impression: No acute findings.    Electronically signed by: Jose Lux MD  Date:    01/15/2020  Time:    12:52        Assessment:       1. Stage 3 severe COPD by GOLD classification    2. Hypoxemia requiring supplemental oxygen    3. Tobacco dependence    4. Chronic coughing        Outpatient Encounter Medications as of 1/15/2020   Medication Sig Dispense Refill    albuterol (PROVENTIL/VENTOLIN HFA) 90 mcg/actuation inhaler INHALE TWO PUFFS BY MOUTH EVERY 6 HOURS AS NEEDED 18 each 11    albuterol-ipratropium (DUO-NEB) 2.5 mg-0.5 mg/3 mL nebulizer solution Use 3-4 times daily for 3 days following discharge. Then use every 6 hours as needed for SOB and wheeze. 1 Box 0    atorvastatin (LIPITOR) 40 MG tablet Take 40 mg by mouth.      betamethasone acetate-betamethasone sodium phosphate (CELESTONE SOLUSPAN) 6 mg/mL injection Inject 6 mg into the muscle.      blood sugar diagnostic Strp 1 each.      blood-glucose meter Misc Use to check blood sugar      clonazePAM (KLONOPIN) 1 MG tablet Take 1 mg by mouth every evening.       docusate sodium (COLACE) 100 MG capsule Take 1 capsule (100 mg total) by mouth 2 (two) times daily. 60 capsule 0    fluticasone propionate (FLOVENT HFA) 220 mcg/actuation inhaler Inhale 1 puff into the lungs 2 (two) times daily. Controller 12 g 11    HYDROcodone-acetaminophen (NORCO)  mg per tablet TAKE ONE TABLET BY MOUTH THREE TIMES DAILY AS NEEDED FOR PAIN (FILL ON OR AFTER 03/04/2019)       inhalation device (VORTEX HOLDING CHAMBER) Use as directed for inhalation. For use with albuterol inhaler. 1 Device prn    metFORMIN (GLUCOPHAGE-XR) 750 MG 24 hr tablet TAKE ONE TABLET BY MOUTH DAILY WITH DINNER  5    montelukast (SINGULAIR) 10 mg tablet Take 10 mg by mouth.      nystatin (MYCOSTATIN) 100,000 unit/mL suspension SWISH & SWALLOW 5 ML BY MOUTH  4 TIMES DAILY FOR 10 DAYS      omeprazole (PRILOSEC) 20 MG capsule Take 20 mg by mouth.      potassium chloride (KLOR-CON) 10 MEQ TbSR Take 10 mEq by mouth 2 (two) times daily.  5    predniSONE (DELTASONE) 20 MG tablet 3 tablets for 3 days. 2 tablets for 3 days. 1 tablet for 3 days. One half tablet for 3 days. 21 tablet 0    promethazine-codeine 6.25-10 mg/5 ml (PHENERGAN WITH CODEINE) 6.25-10 mg/5 mL syrup Take 5 mLs by mouth every 12 (twelve) hours as needed for Cough. 240 mL 0    promethazine-dextromethorphan (PROMETHAZINE-DM) 6.25-15 mg/5 mL Syrp promethazine-DM 6.25 mg-15 mg/5 mL oral syrup 240 mL 0    ranitidine (ZANTAC) 150 MG tablet Take 300 mg by mouth nightly.       tiotropium-olodaterol (STIOLTO RESPIMAT) 2.5-2.5 mcg/actuation Mist Inhale 2 puffs into the lungs once daily. 4 g 11    tramadol (ULTRAM) 50 mg tablet TAKE ONE TABLET BY MOUTH EVERY 6 HOURS AS NEEDED FOR PAIN      varenicline (CHANTIX STARTING MONTH BOX) 0.5 mg (11)- 1 mg (42) tablet TAKE 1 TABLET BY MOUTH TWICE DAILY. TAKE AS DIRECTED      varenicline (CHANTIX STARTING MONTH BOX) 0.5 mg (11)- 1 mg (42) tablet Take one 0.5mg tab once daily for 3 days, then increase to one 0.5mg tab twice daily for 4 days, then 1mg tablet twice daily. 1 Package 0    [DISCONTINUED] predniSONE (DELTASONE) 20 MG tablet 3 tabs for 3 days, 2 tabs for 3 days, 1 tab for 3 days, then a half tab for 3 days. 20 tablet 0     No facility-administered encounter medications on file as of 1/15/2020.      Orders Placed This Encounter   Procedures    Ambulatory referral to Smoking Cessation Program      Referral Priority:   Routine     Referral Type:   Consultation     Referral Reason:   Specialty Services Required     Requested Specialty:   CTTS     Number of Visits Requested:   1    Spirometry without Bronchodilator     Standing Status:   Future     Standing Expiration Date:   1/15/2021    Stress test, pulmonary     Standing Status:   Future     Standing Expiration Date:   1/14/2021     Plan:   Patient requesting cough syrup with Codeine. Codeine inhibits activation of cough reflex arc which may set her up for developing pneumonia.   She also has been referred to pain management.  Taking this medication will not resolve her coughing. Best treatment is to stop smoking with bronchodilator use.   Prednisone for wheezing.   Referral to smoking cessation program.          Problem List Items Addressed This Visit        Pulmonary    Hypoxemia requiring supplemental oxygen    Overview     Benefits from use.         Relevant Medications    varenicline (CHANTIX STARTING MONTH BOX) 0.5 mg (11)- 1 mg (42) tablet    predniSONE (DELTASONE) 20 MG tablet    Other Relevant Orders    Spirometry without Bronchodilator    Ambulatory referral to Smoking Cessation Program    Stress test, pulmonary    Stage 3 severe COPD by GOLD classification    Overview     Stiolto, Flovent, Duoneb, Albuterol, oxygen         Relevant Medications    varenicline (CHANTIX STARTING MONTH BOX) 0.5 mg (11)- 1 mg (42) tablet    predniSONE (DELTASONE) 20 MG tablet    Other Relevant Orders    Spirometry without Bronchodilator    Ambulatory referral to Smoking Cessation Program    Stress test, pulmonary       Other    Tobacco dependence (Chronic)    Current Assessment & Plan     Chantix. Screening CT scheduled annually         Relevant Medications    varenicline (CHANTIX STARTING MONTH BOX) 0.5 mg (11)- 1 mg (42) tablet    predniSONE (DELTASONE) 20 MG tablet    Other Relevant Orders    Spirometry without Bronchodilator    Ambulatory referral to Smoking  Cessation Program      Other Visit Diagnoses     Chronic coughing        Relevant Medications    predniSONE (DELTASONE) 20 MG tablet

## 2020-01-15 NOTE — PROGRESS NOTES
Spoke with patient  Understands Lungs significantly declined  Refractory smoking  Agrees Codeine meds with respiratory suppression not good idea,  Will retry sleep study  Agrees to palliative consiltation      Orders Placed This Encounter   Procedures    Ambulatory Referral to Palliative Care     Referral Priority:   Routine     Referral Type:   Consultation     Requested Specialty:   Hospice and Palliative Medicine     Number of Visits Requested:   1

## 2020-01-24 ENCOUNTER — PATIENT MESSAGE (OUTPATIENT)
Dept: PULMONOLOGY | Facility: CLINIC | Age: 60
End: 2020-01-24

## 2020-01-27 ENCOUNTER — PATIENT MESSAGE (OUTPATIENT)
Dept: PULMONOLOGY | Facility: CLINIC | Age: 60
End: 2020-01-27

## 2020-01-27 ENCOUNTER — TELEPHONE (OUTPATIENT)
Dept: PALLIATIVE MEDICINE | Facility: CLINIC | Age: 60
End: 2020-01-27

## 2020-01-27 NOTE — TELEPHONE ENCOUNTER
"[facility] Palliative Care  Medical Specialty       Patient Name: Sarah Monahan  MRN: [id number]  Primary Care Physician: Lynette Yang MD  Consult Order Date: 1/15/2020  Reason for Referral: palliative care    Spoke to patient about palliative care referral and answered her questions about PC. Patient states she understands her lung function is declining and "there is not much left to do" about it. Emotional support provided. She is agreeable to PC consult and was scheduled for this Thursday at 12:40.     Plan: Patient scheduled for 1/30 at 12:40.      Diana Pendleton, PATRIC, [facility]  [phone]  "

## 2020-01-29 ENCOUNTER — PATIENT MESSAGE (OUTPATIENT)
Dept: PALLIATIVE MEDICINE | Facility: CLINIC | Age: 60
End: 2020-01-29

## 2020-01-30 ENCOUNTER — INITIAL CONSULT (OUTPATIENT)
Dept: PALLIATIVE MEDICINE | Facility: CLINIC | Age: 60
End: 2020-01-30
Payer: MEDICAID

## 2020-01-30 VITALS
HEIGHT: 64 IN | BODY MASS INDEX: 27.7 KG/M2 | OXYGEN SATURATION: 94 % | HEART RATE: 100 BPM | TEMPERATURE: 98 F | RESPIRATION RATE: 18 BRPM | SYSTOLIC BLOOD PRESSURE: 149 MMHG | DIASTOLIC BLOOD PRESSURE: 81 MMHG | WEIGHT: 162.25 LBS

## 2020-01-30 DIAGNOSIS — Z99.81 HYPOXEMIA REQUIRING SUPPLEMENTAL OXYGEN: ICD-10-CM

## 2020-01-30 DIAGNOSIS — J44.9 STAGE 3 SEVERE COPD BY GOLD CLASSIFICATION: ICD-10-CM

## 2020-01-30 DIAGNOSIS — R06.00 DYSPNEA, UNSPECIFIED TYPE: ICD-10-CM

## 2020-01-30 DIAGNOSIS — G89.4 CHRONIC PAIN SYNDROME: Primary | ICD-10-CM

## 2020-01-30 DIAGNOSIS — Z66 DNR (DO NOT RESUSCITATE): ICD-10-CM

## 2020-01-30 DIAGNOSIS — R09.02 HYPOXEMIA REQUIRING SUPPLEMENTAL OXYGEN: ICD-10-CM

## 2020-01-30 PROBLEM — D22.9 MULTIPLE BENIGN NEVI: Status: ACTIVE | Noted: 2019-06-03

## 2020-01-30 PROBLEM — L82.1 SEBORRHEIC KERATOSES: Status: ACTIVE | Noted: 2019-06-03

## 2020-01-30 PROBLEM — L82.0 INFLAMED SEBORRHEIC KERATOSIS: Status: ACTIVE | Noted: 2019-06-03

## 2020-01-30 PROBLEM — R11.10 VOMITING: Status: ACTIVE | Noted: 2019-07-19

## 2020-01-30 PROBLEM — E11.9 TYPE 2 DIABETES MELLITUS WITHOUT COMPLICATION, WITHOUT LONG-TERM CURRENT USE OF INSULIN: Status: ACTIVE | Noted: 2018-04-21

## 2020-01-30 PROBLEM — K21.9 ACID REFLUX: Status: ACTIVE | Noted: 2019-07-19

## 2020-01-30 PROBLEM — J96.21 ACUTE ON CHRONIC RESPIRATORY FAILURE WITH HYPOXIA: Status: ACTIVE | Noted: 2018-04-21

## 2020-01-30 PROBLEM — R13.10 DYSPHAGIA: Status: ACTIVE | Noted: 2019-07-19

## 2020-01-30 PROBLEM — K59.09 CHRONIC CONSTIPATION: Status: ACTIVE | Noted: 2019-07-19

## 2020-01-30 PROBLEM — R13.10 SWALLOWING PAINFUL: Status: ACTIVE | Noted: 2019-07-19

## 2020-01-30 PROBLEM — I82.409 DEEP VENOUS THROMBOSIS: Status: ACTIVE | Noted: 2019-07-19

## 2020-01-30 PROBLEM — H52.203 UNSPECIFIED ASTIGMATISM, BILATERAL: Status: ACTIVE | Noted: 2018-02-22

## 2020-01-30 PROCEDURE — 99497 ADVNCD CARE PLAN 30 MIN: CPT | Mod: S$PBB,,, | Performed by: FAMILY MEDICINE

## 2020-01-30 PROCEDURE — 99204 OFFICE O/P NEW MOD 45 MIN: CPT | Mod: S$PBB,,, | Performed by: FAMILY MEDICINE

## 2020-01-30 PROCEDURE — 99204 PR OFFICE/OUTPT VISIT, NEW, LEVL IV, 45-59 MIN: ICD-10-PCS | Mod: S$PBB,,, | Performed by: FAMILY MEDICINE

## 2020-01-30 PROCEDURE — 99213 OFFICE O/P EST LOW 20 MIN: CPT | Mod: PBBFAC | Performed by: FAMILY MEDICINE

## 2020-01-30 PROCEDURE — 99497 ADVNCD CARE PLAN 30 MIN: CPT | Mod: PBBFAC | Performed by: FAMILY MEDICINE

## 2020-01-30 PROCEDURE — 99999 PR PBB SHADOW E&M-EST. PATIENT-LVL III: ICD-10-PCS | Mod: PBBFAC,,, | Performed by: FAMILY MEDICINE

## 2020-01-30 PROCEDURE — 99497 PR ADVNCD CARE PLAN 30 MIN: ICD-10-PCS | Mod: S$PBB,,, | Performed by: FAMILY MEDICINE

## 2020-01-30 PROCEDURE — 99999 PR PBB SHADOW E&M-EST. PATIENT-LVL III: CPT | Mod: PBBFAC,,, | Performed by: FAMILY MEDICINE

## 2020-01-30 RX ORDER — HYDROCODONE BITARTRATE AND ACETAMINOPHEN 10; 325 MG/1; MG/1
TABLET ORAL
Qty: 90 TABLET | Refills: 0 | Status: SHIPPED | OUTPATIENT
Start: 2020-01-30 | End: 2020-02-28 | Stop reason: SDUPTHER

## 2020-01-30 RX ORDER — OMEPRAZOLE 20 MG/1
CAPSULE, DELAYED RELEASE ORAL
COMMUNITY
Start: 2020-01-28 | End: 2020-12-30 | Stop reason: SDUPTHER

## 2020-01-30 RX ORDER — PROMETHAZINE HYDROCHLORIDE 6.25 MG/5ML
SYRUP ORAL
COMMUNITY
Start: 2020-01-21 | End: 2020-05-07 | Stop reason: SDUPTHER

## 2020-01-30 RX ORDER — HYDROCODONE BITARTRATE AND ACETAMINOPHEN 10; 325 MG/1; MG/1
TABLET ORAL
Qty: 90 TABLET | Refills: 0 | Status: SHIPPED | OUTPATIENT
Start: 2020-01-30 | End: 2020-01-30 | Stop reason: SDUPTHER

## 2020-01-30 NOTE — PROGRESS NOTES
Subjective:       Patient ID: Sarah Monahan is a 59 y.o. female.    Chief Complaint: Consult    60 yo female with advanced COPD here for palliative medicine consultation. Last hospital admission was 1/2019 for COPD exacerbation. She reports that she visits the ER about once ever 2-3 months but avoids it as much as possible. She has h/o of chronic back pain and was getting 90 hydrocodone per month and 60 tramadol per month for chronic back pain from her primary care provider in [facility] who she reports moved away. She has been out of pain medication since November. Reviewed  which are consistent with her reports. She has had recent decline in lung function. Finds it very difficult to move around due to severe worsening of dyspnea. Requests portable O2. Had PO prednisone prescribed 1/15 but had to stop due to elevated blood sugars. Needs PCP to manage her diabetes and HTN.     Advance Care Planning     Code Status  In light of the patients advanced and life limiting illness,I engaged the the patient and family in a conversation about the patient's preferences for care  at the very end of life. The patient wishes to have a natural, peaceful death.  Along those lines, the patient does not wish to have CPR or other invasive treatments performed when her heart and/or breathing stops. I communicated to the patient and family that a LaPOST form was completed to reflect other EOL preferences of the patient such as NO MECHANICAL VENTILATION.  I spent a total of 25 minutes engaging the patient in this advance care planning discussion.          does not have any pertinent problems on file.  Past Medical History:   Diagnosis Date    Acid reflux     Anemia     Asthma     Bronchitis chronic     Diabetes mellitus     pt states she's not a diabetic    DVT, lower extremity     RLE    Emphysema of lung     Hepatitis B     Hepatitis C     Herniated disc     Hyperlipidemia     Kidney stone     Lung disease      Oxygen dependent     nightly    Supraventricular tachycardia     Urinary tract infection      Past Surgical History:   Procedure Laterality Date    abdominal laparotomy      BREAST SURGERY Right     lumpectomy x 2 benign    CERVICAL FUSION      C4 & C5     SECTION, CLASSIC      CHOLECYSTECTOMY      CYSTOSCOPY W/ LASER LITHOTRIPSY      HERNIA REPAIR      KNEE ARTHROSCOPY W/ ACL RECONSTRUCTION      L knee    orif knee Right     SALPINGECTOMY Right      Family History   Problem Relation Age of Onset    Cancer Mother     Diabetes Father     Hypertension Father     Heart disease Father     Heart attack Father 60        AMI- CABG    Cancer Maternal Grandmother     Diabetes Paternal Grandmother     Heart failure Paternal Grandmother      Social History     Socioeconomic History    Marital status:      Spouse name: Not on file    Number of children: Not on file    Years of education: Not on file    Highest education level: Not on file   Occupational History    Not on file   Social Needs    Financial resource strain: Not on file    Food insecurity:     Worry: Not on file     Inability: Not on file    Transportation needs:     Medical: Not on file     Non-medical: Not on file   Tobacco Use    Smoking status: Current Every Day Smoker     Packs/day: 1.00     Years: 40.00     Pack years: 40.00     Last attempt to quit: 2015     Years since quittin.6    Smokeless tobacco: Never Used    Tobacco comment: no smoking after m.n prior to sx   Substance and Sexual Activity    Alcohol use: No     Alcohol/week: 0.0 standard drinks    Drug use: No    Sexual activity: Yes     Partners: Male   Lifestyle    Physical activity:     Days per week: Not on file     Minutes per session: Not on file    Stress: Not on file   Relationships    Social connections:     Talks on phone: Not on file     Gets together: Not on file     Attends Zoroastrian service: Not on file     Active member of  club or organization: Not on file     Attends meetings of clubs or organizations: Not on file     Relationship status: Not on file   Other Topics Concern    Not on file   Social History Narrative    Not on file     Review of Systems   Constitutional: Positive for fatigue. Negative for unexpected weight change.   HENT: Negative for hearing loss and sore throat.    Eyes: Negative for pain and visual disturbance.   Respiratory: Positive for cough, chest tightness, shortness of breath and wheezing.    Cardiovascular: Negative for chest pain and palpitations.   Gastrointestinal: Negative for abdominal pain, constipation and diarrhea.   Genitourinary: Negative for difficulty urinating, dysuria and vaginal discharge.   Musculoskeletal: Positive for arthralgias and back pain. Negative for myalgias.   Skin: Negative for rash and wound.   Neurological: Negative for light-headedness and headaches.   Hematological: Negative.    Psychiatric/Behavioral: Positive for dysphoric mood and sleep disturbance. The patient is nervous/anxious.        Objective:     Vitals:    01/30/20 1245   BP: (!) 149/81   Pulse: 100   Resp: 18   Temp: 97.5 °F (36.4 °C)        Physical Exam   Constitutional: She is oriented to person, place, and time. She appears well-developed and well-nourished.   HENT:   Head: Normocephalic and atraumatic.   Eyes: Pupils are equal, round, and reactive to light. EOM are normal.   Neck: Normal range of motion. Neck supple.   Cardiovascular: Regular rhythm.   No murmur heard.  tachycardic   Pulmonary/Chest: She is in respiratory distress. She has wheezes.   Musculoskeletal: Normal range of motion. She exhibits no deformity.   Neurological: She is alert and oriented to person, place, and time.   Skin: Skin is warm and dry.   Psychiatric: She has a normal mood and affect. Her behavior is normal.   Nursing note and vitals reviewed.      Assessment:       1. Chronic pain syndrome    2. Hypoxemia requiring supplemental  oxygen    3. Stage 3 severe COPD by GOLD classification    4. Dyspnea, unspecified type    5. DNR (do not resuscitate)        Plan:           Problem List Items Addressed This Visit        Neuro    Chronic pain - Primary    Relevant Medications    HYDROcodone-acetaminophen (NORCO)  mg per tablet       Pulmonary    Hypoxemia requiring supplemental oxygen    Overview     Benefits from use.         Current Assessment & Plan      Will work with pulmonology team to get an option for portable oxygen.         Relevant Medications    HYDROcodone-acetaminophen (NORCO)  mg per tablet    Stage 3 severe COPD by GOLD classification    Overview     Stiolto, Flovent, Duoneb, Albuterol, oxygen         Relevant Medications    HYDROcodone-acetaminophen (NORCO)  mg per tablet       Palliative Care    DNR (do not resuscitate)    Overview      [place] post completed with patient and her  and scanned to chart.            Other    Dyspnea    Overview     Will start back on hydrocodone for chronic back pain which should also help with her dyspnea sensation.  Goal is to transition to long-acting morphine             Discussed risks and benefits of narcotic pain medication particularly in light of lung disease.  Patient believes benefit outweighs risk.  Will see back in 4 weeks for follow-up.  She is not hospice appropriate at this time, but I would recommend referral to hospice care when her lung function has declined to the point of 6 month life expectancy or less.  I believe she would have the most support with her services and care would be most in line with her goals.

## 2020-01-30 NOTE — LETTER
January 30, 2020      David Toscano MD  [address]  [place] [address]           [facility]  [address]  [place] [address]  Phone: [phone]  Fax: [phone]          Patient: Sarah Monahan   MR Number: [id number]   YOB: 1960   Date of Visit: 1/30/2020       Dear Dr. David Toscano:    Thank you for referring Saarh Monahan to me for evaluation. Attached you will find relevant portions of my assessment and plan of care.    If you have questions, please do not hesitate to call me. I look forward to following Sarah Monahan along with you.    Sincerely,    Alycia Salguero MD    Enclosure  CC:  No Recipients    If you would like to receive this communication electronically, please contact [email or web address] or [phone] to request more information on [facility] access.    For providers and/or their staff who would like to refer a patient to [facility], please contact us through our one-stop-shop provider referral line, [facility], at [phone].    If you feel you have received this communication in error or would no longer like to receive these types of communications, please e-mail [email or web address]

## 2020-02-04 ENCOUNTER — PATIENT MESSAGE (OUTPATIENT)
Dept: PALLIATIVE MEDICINE | Facility: CLINIC | Age: 60
End: 2020-02-04

## 2020-02-06 ENCOUNTER — PATIENT MESSAGE (OUTPATIENT)
Dept: PALLIATIVE MEDICINE | Facility: CLINIC | Age: 60
End: 2020-02-06

## 2020-02-06 RX ORDER — CLONAZEPAM 1 MG/1
1 TABLET ORAL 2 TIMES DAILY
Qty: 60 TABLET | Refills: 0 | Status: SHIPPED | OUTPATIENT
Start: 2020-02-06 | End: 2020-03-05 | Stop reason: SDUPTHER

## 2020-02-21 ENCOUNTER — PATIENT MESSAGE (OUTPATIENT)
Dept: PALLIATIVE MEDICINE | Facility: CLINIC | Age: 60
End: 2020-02-21

## 2020-02-25 DIAGNOSIS — J44.9 STAGE 3 SEVERE COPD BY GOLD CLASSIFICATION: ICD-10-CM

## 2020-02-25 DIAGNOSIS — R09.02 HYPOXEMIA REQUIRING SUPPLEMENTAL OXYGEN: ICD-10-CM

## 2020-02-25 DIAGNOSIS — G89.4 CHRONIC PAIN SYNDROME: ICD-10-CM

## 2020-02-25 DIAGNOSIS — Z99.81 HYPOXEMIA REQUIRING SUPPLEMENTAL OXYGEN: ICD-10-CM

## 2020-02-25 NOTE — TELEPHONE ENCOUNTER
Dr. Salguero is out of office until next week. Can this rx be approved through one of Hem/Onc doctors?

## 2020-02-26 ENCOUNTER — PATIENT MESSAGE (OUTPATIENT)
Dept: PALLIATIVE MEDICINE | Facility: CLINIC | Age: 60
End: 2020-02-26

## 2020-02-26 ENCOUNTER — TELEPHONE (OUTPATIENT)
Dept: HEMATOLOGY/ONCOLOGY | Facility: CLINIC | Age: 60
End: 2020-02-26

## 2020-02-26 DIAGNOSIS — R05.3 CHRONIC COUGHING: ICD-10-CM

## 2020-02-26 DIAGNOSIS — R09.02 HYPOXEMIA REQUIRING SUPPLEMENTAL OXYGEN: ICD-10-CM

## 2020-02-26 DIAGNOSIS — Z99.81 HYPOXEMIA REQUIRING SUPPLEMENTAL OXYGEN: ICD-10-CM

## 2020-02-26 DIAGNOSIS — J44.9 STAGE 3 SEVERE COPD BY GOLD CLASSIFICATION: ICD-10-CM

## 2020-02-26 DIAGNOSIS — F17.200 TOBACCO DEPENDENCE: Chronic | ICD-10-CM

## 2020-02-26 RX ORDER — PREDNISONE 20 MG/1
TABLET ORAL
Qty: 21 TABLET | Refills: 0 | Status: SHIPPED | OUTPATIENT
Start: 2020-02-26 | End: 2020-05-01

## 2020-02-26 RX ORDER — HYDROCODONE BITARTRATE AND ACETAMINOPHEN 10; 325 MG/1; MG/1
TABLET ORAL
Qty: 90 TABLET | Refills: 0 | OUTPATIENT
Start: 2020-02-26

## 2020-02-28 ENCOUNTER — PATIENT MESSAGE (OUTPATIENT)
Dept: PALLIATIVE MEDICINE | Facility: CLINIC | Age: 60
End: 2020-02-28

## 2020-02-28 DIAGNOSIS — Z99.81 HYPOXEMIA REQUIRING SUPPLEMENTAL OXYGEN: ICD-10-CM

## 2020-02-28 DIAGNOSIS — G89.4 CHRONIC PAIN SYNDROME: ICD-10-CM

## 2020-02-28 DIAGNOSIS — J44.9 STAGE 3 SEVERE COPD BY GOLD CLASSIFICATION: ICD-10-CM

## 2020-02-28 DIAGNOSIS — R09.02 HYPOXEMIA REQUIRING SUPPLEMENTAL OXYGEN: ICD-10-CM

## 2020-03-01 RX ORDER — HYDROCODONE BITARTRATE AND ACETAMINOPHEN 10; 325 MG/1; MG/1
TABLET ORAL
Qty: 90 TABLET | Refills: 0 | Status: SHIPPED | OUTPATIENT
Start: 2020-03-01 | End: 2020-03-05 | Stop reason: SDUPTHER

## 2020-03-05 ENCOUNTER — OFFICE VISIT (OUTPATIENT)
Dept: PALLIATIVE MEDICINE | Facility: CLINIC | Age: 60
End: 2020-03-05
Payer: MEDICAID

## 2020-03-05 VITALS
TEMPERATURE: 97 F | HEART RATE: 88 BPM | HEIGHT: 64 IN | DIASTOLIC BLOOD PRESSURE: 70 MMHG | WEIGHT: 163.56 LBS | BODY MASS INDEX: 27.92 KG/M2 | SYSTOLIC BLOOD PRESSURE: 106 MMHG | OXYGEN SATURATION: 96 %

## 2020-03-05 DIAGNOSIS — G89.4 CHRONIC PAIN SYNDROME: ICD-10-CM

## 2020-03-05 DIAGNOSIS — J44.9 STAGE 3 SEVERE COPD BY GOLD CLASSIFICATION: ICD-10-CM

## 2020-03-05 DIAGNOSIS — Z99.81 HYPOXEMIA REQUIRING SUPPLEMENTAL OXYGEN: ICD-10-CM

## 2020-03-05 DIAGNOSIS — G47.00 INSOMNIA, UNSPECIFIED TYPE: Primary | ICD-10-CM

## 2020-03-05 DIAGNOSIS — B35.3 TINEA PEDIS OF RIGHT FOOT: ICD-10-CM

## 2020-03-05 DIAGNOSIS — R09.02 HYPOXEMIA REQUIRING SUPPLEMENTAL OXYGEN: ICD-10-CM

## 2020-03-05 PROCEDURE — 99213 OFFICE O/P EST LOW 20 MIN: CPT | Mod: PBBFAC | Performed by: FAMILY MEDICINE

## 2020-03-05 PROCEDURE — 99214 PR OFFICE/OUTPT VISIT, EST, LEVL IV, 30-39 MIN: ICD-10-PCS | Mod: S$PBB,,, | Performed by: FAMILY MEDICINE

## 2020-03-05 PROCEDURE — 99999 PR PBB SHADOW E&M-EST. PATIENT-LVL III: CPT | Mod: PBBFAC,,, | Performed by: FAMILY MEDICINE

## 2020-03-05 PROCEDURE — 99214 OFFICE O/P EST MOD 30 MIN: CPT | Mod: S$PBB,,, | Performed by: FAMILY MEDICINE

## 2020-03-05 PROCEDURE — 99999 PR PBB SHADOW E&M-EST. PATIENT-LVL III: ICD-10-PCS | Mod: PBBFAC,,, | Performed by: FAMILY MEDICINE

## 2020-03-05 RX ORDER — HYDROCODONE BITARTRATE AND ACETAMINOPHEN 10; 325 MG/1; MG/1
TABLET ORAL
Qty: 90 TABLET | Refills: 0 | Status: SHIPPED | OUTPATIENT
Start: 2020-03-05 | End: 2020-03-27 | Stop reason: SDUPTHER

## 2020-03-05 RX ORDER — KETOCONAZOLE 20 MG/G
CREAM TOPICAL DAILY
Qty: 30 G | Refills: 1 | Status: SHIPPED | OUTPATIENT
Start: 2020-03-05 | End: 2020-05-04

## 2020-03-05 RX ORDER — CLONAZEPAM 1 MG/1
1 TABLET ORAL 2 TIMES DAILY
Qty: 60 TABLET | Refills: 0 | Status: SHIPPED | OUTPATIENT
Start: 2020-03-05 | End: 2020-04-03

## 2020-03-05 RX ORDER — MORPHINE SULFATE 15 MG/1
15 TABLET, FILM COATED, EXTENDED RELEASE ORAL EVERY 12 HOURS
Qty: 60 TABLET | Refills: 0 | Status: SHIPPED | OUTPATIENT
Start: 2020-03-05 | End: 2020-03-27

## 2020-03-05 RX ORDER — MIRTAZAPINE 15 MG/1
15 TABLET, FILM COATED ORAL NIGHTLY
Qty: 30 TABLET | Refills: 11 | Status: SHIPPED | OUTPATIENT
Start: 2020-03-05 | End: 2020-05-04

## 2020-03-05 NOTE — PATIENT INSTRUCTIONS
Remember to hold clonazepam for 1 week after starting the new pain medication (long-acting morphine) due to the risk of respiratory and depression when combining these medications. If you find that you have sedation as a side effect of the pain medication, please call me and do not restart the clonazepam.

## 2020-03-05 NOTE — PROGRESS NOTES
Subjective:       Patient ID: Sarah Monahan is a 59 y.o. female.    Chief Complaint: Follow-up    58 yo female here for palliative medicine follow-up. She has been taking hydrocodone 10 mg 3 times a day. Has had a recent COPD exacerbation with ER visit and is on steroids currently. C/o trouble sleeping as well as depressive feelings due to limitations in her ability to do things she enjoys. Back pain has been improved with pain medications as long as she isn't standing for long periods.     does not have any pertinent problems on file.  Past Medical History:   Diagnosis Date    Acid reflux     Anemia     Asthma     Bronchitis chronic     Diabetes mellitus     pt states she's not a diabetic    DVT, lower extremity     RLE    Emphysema of lung     Hepatitis B     Hepatitis C     Herniated disc     Hyperlipidemia     Kidney stone     Lung disease     Oxygen dependent     nightly    Supraventricular tachycardia     Urinary tract infection      Past Surgical History:   Procedure Laterality Date    abdominal laparotomy      BREAST SURGERY Right     lumpectomy x 2 benign    CERVICAL FUSION      C4 & C5     SECTION, CLASSIC      CHOLECYSTECTOMY      CYSTOSCOPY W/ LASER LITHOTRIPSY      HERNIA REPAIR      KNEE ARTHROSCOPY W/ ACL RECONSTRUCTION      L knee    orif knee Right     SALPINGECTOMY Right      Family History   Problem Relation Age of Onset    Cancer Mother     Diabetes Father     Hypertension Father     Heart disease Father     Heart attack Father 60        [facility]I- CABG    Cancer Maternal Grandmother     Diabetes Paternal Grandmother     Heart failure Paternal Grandmother      Social History     Socioeconomic History    Marital status:      Spouse name: Not on file    Number of children: Not on file    Years of education: Not on file    Highest education level: Not on file   Occupational History    Not on file   Social Needs    Financial resource strain:  Not on file    Food insecurity:     Worry: Not on file     Inability: Not on file    Transportation needs:     Medical: Not on file     Non-medical: Not on file   Tobacco Use    Smoking status: Current Every Day Smoker     Packs/day: 1.00     Years: 40.00     Pack years: 40.00     Last attempt to quit: 2015     Years since quittin.7    Smokeless tobacco: Never Used    Tobacco comment: no smoking after m.n prior to sx   Substance and Sexual Activity    Alcohol use: No     Alcohol/week: 0.0 standard drinks    Drug use: No    Sexual activity: Yes     Partners: Male   Lifestyle    Physical activity:     Days per week: Not on file     Minutes per session: Not on file    Stress: Not on file   Relationships    Social connections:     Talks on phone: Not on file     Gets together: Not on file     Attends Tenriism service: Not on file     Active member of club or organization: Not on file     Attends meetings of clubs or organizations: Not on file     Relationship status: Not on file   Other Topics Concern    Not on file   Social History Narrative    Not on file     Review of Systems   Constitutional: Negative for fatigue and unexpected weight change.   HENT: Negative for hearing loss and sore throat.    Eyes: Negative for pain and visual disturbance.   Respiratory: Positive for cough, shortness of breath and wheezing.    Cardiovascular: Negative for chest pain and palpitations.   Gastrointestinal: Negative for abdominal pain, constipation and diarrhea.   Genitourinary: Negative for difficulty urinating, dysuria and vaginal discharge.   Musculoskeletal: Negative for arthralgias and myalgias.   Skin: Negative for rash and wound.   Neurological: Negative for light-headedness and headaches.   Hematological: Negative.    Psychiatric/Behavioral: Positive for dysphoric mood and sleep disturbance (on steroids). The patient is nervous/anxious.        Objective:     Vitals:    20 1144   BP: 106/70    Pulse: 88   Temp: 97 °F (36.1 °C)        Physical Exam   Constitutional: She is oriented to person, place, and time. She appears well-developed. She appears distressed.   HENT:   Head: Normocephalic and atraumatic.   Eyes: Pupils are equal, round, and reactive to light. EOM are normal.   Neck: Normal range of motion. Neck supple.   Cardiovascular: Normal rate and regular rhythm.   Pulmonary/Chest: She is in respiratory distress. She has wheezes.   Wet sounding cough; short of breath with speaking and walking.    Musculoskeletal: Normal range of motion. She exhibits no deformity.   Neurological: She is alert and oriented to person, place, and time.   Skin: Skin is warm and dry. There is pallor.   Psychiatric: She has a normal mood and affect. Her behavior is normal.   Nursing note and vitals reviewed.      Assessment:       1. Insomnia, unspecified type    2. Chronic pain syndrome    3. Hypoxemia requiring supplemental oxygen    4. Stage 3 severe COPD by GOLD classification    5. Tinea pedis of right foot        Plan:           Problem List Items Addressed This Visit        Neuro    Chronic pain    Relevant Medications    morphine (MS CONTIN) 15 MG 12 hr tablet    HYDROcodone-acetaminophen (NORCO)  mg per tablet       Pulmonary    Hypoxemia requiring supplemental oxygen    Overview     Benefits from use.         Relevant Medications    morphine (MS CONTIN) 15 MG 12 hr tablet    HYDROcodone-acetaminophen (NORCO)  mg per tablet    Stage 3 severe COPD by GOLD classification    Overview     Stiolto, Flovent, Duoneb, Albuterol, oxygen         Relevant Medications    morphine (MS CONTIN) 15 MG 12 hr tablet    HYDROcodone-acetaminophen (NORCO)  mg per tablet       Other    Insomnia - Primary    Relevant Medications    mirtazapine (REMERON) 15 MG tablet      Other Visit Diagnoses     Tinea pedis of right foot        Relevant Medications    ketoconazole (NIZORAL) 2 % cream      Counseled to hold clonazepam  for the first week she is starting MS Contin due to risk of increased sedation and respiratory depression. Patient expressed understanding and is in agreement with holding clonazepam for now. Mirtazapine added for difficulty with sleep; suspect it is related to prednisone.    Has upcoming appointment with pulmonology; is hoping to get portable option for her oxygen.

## 2020-03-18 ENCOUNTER — TELEPHONE (OUTPATIENT)
Dept: PALLIATIVE MEDICINE | Facility: CLINIC | Age: 60
End: 2020-03-18

## 2020-03-18 NOTE — TELEPHONE ENCOUNTER
Spoke with [facility] and they would like you to take over the klonopin and they will handle all primary care needs

## 2020-03-18 NOTE — TELEPHONE ENCOUNTER
She's not on fentanyl; I was aware and have reviewed her . I can take over the clonazepam as well if they prefer. Please let them know I am seeing her for palliative care and am not managing any primary care needs for her.

## 2020-03-18 NOTE — TELEPHONE ENCOUNTER
"----- [facility] mock/[facility]  Caller request call back using control substances together, requesting a letter stating its "ok"   Fax: [phone] ph: [phone]         Spoke with [facility] and she wanted to make sure that you was aware that they have been filling her klonopin . They will stop filling it if you will be handling it . Also the pt told them that she was on fentanyl patches I or [facility] could not find in her chart Advise .   "

## 2020-03-24 ENCOUNTER — PATIENT MESSAGE (OUTPATIENT)
Dept: PALLIATIVE MEDICINE | Facility: CLINIC | Age: 60
End: 2020-03-24

## 2020-03-25 ENCOUNTER — PATIENT MESSAGE (OUTPATIENT)
Dept: PALLIATIVE MEDICINE | Facility: CLINIC | Age: 60
End: 2020-03-25

## 2020-03-25 DIAGNOSIS — Z99.81 HYPOXEMIA REQUIRING SUPPLEMENTAL OXYGEN: ICD-10-CM

## 2020-03-25 DIAGNOSIS — J44.9 STAGE 3 SEVERE COPD BY GOLD CLASSIFICATION: ICD-10-CM

## 2020-03-25 DIAGNOSIS — R09.02 HYPOXEMIA REQUIRING SUPPLEMENTAL OXYGEN: ICD-10-CM

## 2020-03-25 DIAGNOSIS — G89.4 CHRONIC PAIN SYNDROME: ICD-10-CM

## 2020-03-27 ENCOUNTER — PATIENT MESSAGE (OUTPATIENT)
Dept: PALLIATIVE MEDICINE | Facility: CLINIC | Age: 60
End: 2020-03-27

## 2020-03-27 RX ORDER — HYDROCODONE BITARTRATE AND ACETAMINOPHEN 10; 325 MG/1; MG/1
1 TABLET ORAL EVERY 6 HOURS PRN
Qty: 120 TABLET | Refills: 0 | Status: SHIPPED | OUTPATIENT
Start: 2020-03-27 | End: 2020-04-24 | Stop reason: SDUPTHER

## 2020-04-02 ENCOUNTER — PATIENT MESSAGE (OUTPATIENT)
Dept: PALLIATIVE MEDICINE | Facility: CLINIC | Age: 60
End: 2020-04-02

## 2020-04-03 ENCOUNTER — PATIENT MESSAGE (OUTPATIENT)
Dept: PALLIATIVE MEDICINE | Facility: CLINIC | Age: 60
End: 2020-04-03

## 2020-04-03 RX ORDER — CLONAZEPAM 1 MG/1
1 TABLET ORAL 2 TIMES DAILY
Qty: 60 TABLET | Refills: 0 | Status: SHIPPED | OUTPATIENT
Start: 2020-04-03 | End: 2020-05-01 | Stop reason: SDUPTHER

## 2020-04-03 NOTE — TELEPHONE ENCOUNTER
Patient says that she was unable to get good reception for the video visit on yesterday.  She would like a phone call if you are available.  She states that her oxygen has dropped to 84 and she will be going to the hospital.

## 2020-04-06 ENCOUNTER — PATIENT MESSAGE (OUTPATIENT)
Dept: SLEEP MEDICINE | Facility: CLINIC | Age: 60
End: 2020-04-06

## 2020-04-07 ENCOUNTER — PATIENT MESSAGE (OUTPATIENT)
Dept: PULMONOLOGY | Facility: CLINIC | Age: 60
End: 2020-04-07

## 2020-04-13 ENCOUNTER — TELEPHONE (OUTPATIENT)
Dept: PULMONOLOGY | Facility: CLINIC | Age: 60
End: 2020-04-13

## 2020-04-13 NOTE — TELEPHONE ENCOUNTER
Informed pt that we are no longer seeing pts in the clinic. Pt states she is going to contact her PCP to do the walk.

## 2020-04-14 ENCOUNTER — PATIENT MESSAGE (OUTPATIENT)
Dept: PALLIATIVE MEDICINE | Facility: CLINIC | Age: 60
End: 2020-04-14

## 2020-04-16 ENCOUNTER — PATIENT MESSAGE (OUTPATIENT)
Dept: PALLIATIVE MEDICINE | Facility: CLINIC | Age: 60
End: 2020-04-16

## 2020-04-16 ENCOUNTER — OFFICE VISIT (OUTPATIENT)
Dept: PALLIATIVE MEDICINE | Facility: CLINIC | Age: 60
End: 2020-04-16
Payer: MEDICAID

## 2020-04-16 DIAGNOSIS — R06.00 DYSPNEA, UNSPECIFIED TYPE: Primary | ICD-10-CM

## 2020-04-16 DIAGNOSIS — J44.9 STAGE 3 SEVERE COPD BY GOLD CLASSIFICATION: ICD-10-CM

## 2020-04-16 PROCEDURE — 99442 PR PHYSICIAN TELEPHONE EVALUATION 11-20 MIN: CPT | Mod: 95,,, | Performed by: FAMILY MEDICINE

## 2020-04-16 PROCEDURE — 99442 PR PHYSICIAN TELEPHONE EVALUATION 11-20 MIN: ICD-10-PCS | Mod: 95,,, | Performed by: FAMILY MEDICINE

## 2020-04-16 RX ORDER — MORPHINE SULFATE 15 MG/1
15 TABLET, FILM COATED, EXTENDED RELEASE ORAL 2 TIMES DAILY
Qty: 30 TABLET | Refills: 0 | Status: SHIPPED | OUTPATIENT
Start: 2020-04-16 | End: 2020-04-30 | Stop reason: SDUPTHER

## 2020-04-16 NOTE — PROGRESS NOTES
Subjective:       Patient ID: Sarah Monahan is a 60 y.o. female.    Chief Complaint: shortness of breath    The patient location is: [place]  The chief complaint leading to consultation is: shortness of breath, palliative medicine follow up  Visit type: audio only  Total time spent with patient: 20 minutes  Each patient to whom he or she provides medical services by telemedicine is:  (1) informed of the relationship between the physician and patient and the respective role of any other health care provider with respect to management of the patient; and (2) notified that he or she may decline to receive medical services by telemedicine and may withdraw from such care at any time.    Notes: 61 yo female here to discuss medication. She reports getting limited relief from hydrocodone; usually doesn't last more than 3-4 hours. She was prescribed MS Contin but did not have it filled per . She would like to try it now due to worsening dyspnea. Her pulmonology follow up was cancelled due to lack of provider availability during COVID pandemic. She states she's doing ok on home oxygen for the time being but is hoping to eventually get a portable compressor.       does not have any pertinent problems on file.  Past Medical History:   Diagnosis Date    Acid reflux     Anemia     Asthma     Bronchitis chronic     Diabetes mellitus     pt states she's not a diabetic    DVT, lower extremity     RLE    Emphysema of lung     Hepatitis B     Hepatitis C     Herniated disc     Hyperlipidemia     Kidney stone     Lung disease     Oxygen dependent     nightly    Supraventricular tachycardia     Urinary tract infection      Past Surgical History:   Procedure Laterality Date    abdominal laparotomy      BREAST SURGERY Right     lumpectomy x 2 benign    CERVICAL FUSION      C4 & C5     SECTION, CLASSIC      CHOLECYSTECTOMY      CYSTOSCOPY W/ LASER LITHOTRIPSY      HERNIA REPAIR      KNEE ARTHROSCOPY  W/ ACL RECONSTRUCTION      L knee    orif knee Right     SALPINGECTOMY Right      Family History   Problem Relation Age of Onset    Cancer Mother     Diabetes Father     Hypertension Father     Heart disease Father     Heart attack Father 60        AMI- CABG    Cancer Maternal Grandmother     Diabetes Paternal Grandmother     Heart failure Paternal Grandmother      Social History     Socioeconomic History    Marital status:      Spouse name: Not on file    Number of children: Not on file    Years of education: Not on file    Highest education level: Not on file   Occupational History    Not on file   Social Needs    Financial resource strain: Not on file    Food insecurity:     Worry: Not on file     Inability: Not on file    Transportation needs:     Medical: Not on file     Non-medical: Not on file   Tobacco Use    Smoking status: Current Every Day Smoker     Packs/day: 1.00     Years: 40.00     Pack years: 40.00     Last attempt to quit: 2015     Years since quittin.9    Smokeless tobacco: Never Used    Tobacco comment: no smoking after m.n prior to sx   Substance and Sexual Activity    Alcohol use: No     Alcohol/week: 0.0 standard drinks    Drug use: No    Sexual activity: Yes     Partners: Male   Lifestyle    Physical activity:     Days per week: Not on file     Minutes per session: Not on file    Stress: Not on file   Relationships    Social connections:     Talks on phone: Not on file     Gets together: Not on file     Attends Hoahaoism service: Not on file     Active member of club or organization: Not on file     Attends meetings of clubs or organizations: Not on file     Relationship status: Not on file   Other Topics Concern    Not on file   Social History Narrative    Not on file     Review of Systems   A comprehensive 14-point review of systems was reviewed with patient and was negative other than as specified above.     Objective:   There were no vitals  filed for this visit.     Physical Exam   Constitutional: She is oriented to person, place, and time. She appears well-developed and well-nourished. No distress.   HENT:   Head: Normocephalic and atraumatic.   Eyes: EOM are normal. No scleral icterus.   Neck: Normal range of motion. Neck supple.   Pulmonary/Chest: Effort normal. No respiratory distress.   Musculoskeletal: Normal range of motion.   Neurological: She is alert and oriented to person, place, and time.   Skin: No rash noted.   Psychiatric: She has a normal mood and affect. Her behavior is normal. Thought content normal.       Assessment:       1. Dyspnea, unspecified type    2. Stage 3 severe COPD by GOLD classification        Plan:           Problem List Items Addressed This Visit        Pulmonary    Stage 3 severe COPD by GOLD classification    Overview     Stiolto, Flovent, Duoneb, Albuterol, oxygen         Relevant Medications    morphine (MS CONTIN) 15 MG 12 hr tablet       Other    Dyspnea - Primary    Overview     Will start back on hydrocodone for chronic back pain which should also help with her dyspnea sensation.  Goal is to transition to long-acting morphine         Relevant Medications    morphine (MS CONTIN) 15 MG 12 hr tablet      30 day supply of hydrocodone filled 3/27/20; next rx due in 2 weeks.

## 2020-04-16 NOTE — PATIENT INSTRUCTIONS
Morphine sustained-release tablets  What is this medicine?  MORPHINE (MOR feen) is a pain reliever. It is used to treat moderate to severe pain that lasts for more than a few days.  How should I use this medicine?  Take this medicine by mouth with a glass of water. Do not break, crush, or chew the medicine. Do not take a tablet that is not whole. A broken or crushed tablet can be very dangerous. You may get too much medicine. If the medicine upsets your stomach, take it with food or milk. Follow the directions on the prescription label. Take the medicine at the same time each day. Do not take more medicine than you are told to take.  A special MedGuide will be given to you by the pharmacist with each prescription and refill. Be sure to read this information carefully each time.  Talk to your pediatrician regarding the use of this medicine in children. Special care may be needed.  What side effects may I notice from receiving this medicine?  Side effects that you should report to your doctor or health care professional as soon as possible:  · allergic reactions like skin rash, itching or hives, swelling of the face, lips, or tongue  · breathing problems  · confusion  · seizures  · signs and symptoms of low blood pressure like dizziness; feeling faint or lightheaded, falls; unusually weak or tired  · trouble passing urine or change in the amount of urine  Side effects that usually do not require medical attention (report to your doctor or health care professional if they continue or are bothersome):  · constipation  · dry mouth  · nausea, vomiting  · tiredness  What may interact with this medicine?  This medicine may interact with the following medications:  · alcohol  · antihistamines for allergy, cough and cold  · atropine  · certain medicines for anxiety or sleep  · certain medicines for bladder problems like oxybutynin, tolterodine  · certain medicines for depression like amitriptyline, fluoxetine,  sertraline  · certain medicines for Parkinson's disease like benztropine, trihexyphenidyl  · certain medicines for seizures like phenobarbital, primidone  · certain medicines for stomach problems like dicyclomine, hyoscyamine  · certain medicines for travel sickness like scopolamine  · cimetidine  · general anesthetics like halothane, isoflurane, methoxyflurane, propofol  · ipratropium  · local anesthetics like lidocaine, pramoxine, tetracaine  · MAOIs like Carbex, Eldepryl, Marplan, Nardil, and Parnate  · medicines that relax muscles for surgery  · other narcotic medicines for pain or cough  · phenothiazines like chlorpromazine, mesoridazine, prochlorperazine, thioridazine  What if I miss a dose?  If you miss a dose, take it as soon as you can. If it is almost time for your next dose, take only that dose. Do not take double or extra doses.  Where should I keep my medicine?  Keep out of the reach of children. This medicine can be abused. Keep your medicine in a safe place to protect it from theft. Do not share this medicine with anyone. Selling or giving away this medicine is dangerous and is against the law.  Store at room temperature between 15 and 30 degrees C (59 and 86 degrees F). Protect from light.  This medicine may cause accidental overdose and death if it is taken by other adults, children, or pets. Flush any unused medicine down the toilet to reduce the chance of harm. Do not use the medicine after the expiration date.  What should I tell my health care provider before I take this medicine?  They need to know if you have any of these conditions:  · brain tumor  · drug abuse or addiction  · head injury  · heart disease  · if you often drink alcohol  · liver disease  · lung or breathing disease, like asthma  · problems urinating  · seizures  · stomach or intestine problems  · taken an MAOI like Carbex, Eldepryl, Marplan, Nardil, or Parnate in the last 14 days  · an unusual or allergic reaction to morphine,  other medications, foods, dyes, or preservatives  · pregnant or trying to get pregnant  · breast-feeding  What should I watch for while using this medicine?  Tell your doctor or health care professional if your pain does not go away, if it gets worse, or if you have new or a different type of pain. You may develop tolerance to the medicine. Tolerance means that you will need a higher dose of the medicine for pain relief. Tolerance is normal and is expected if you take this medicine for a long time.  Do not suddenly stop taking your medicine because you may develop a severe reaction. Your body becomes used to the medicine. This does NOT mean you are addicted. Addiction is a behavior related to getting and using a drug for a non-medical reason. If you have pain, you have a medical reason to take pain medicine. Your doctor will tell you how much medicine to take. If your doctor wants you to stop the medicine, the dose will be slowly lowered over time to avoid any side effects.  There are different types of narcotic medicines (opiates). If you take more than one type at the same time or if you are taking another medicine that also causes drowsiness, you may have more side effects. Give your health care provider a list of all medicines you use. Your doctor will tell you how much medicine to take. Do not take more medicine than directed. Call emergency for help if you have problems breathing or unusual sleepiness.  You may get drowsy or dizzy. Do not drive, use machinery, or do anything that needs mental alertness until you know how this medicine affects you. Do not stand or sit up quickly, especially if you are an older patient. This reduces the risk of dizzy or fainting spells. Alcohol may interfere with the effect of this medicine. Avoid alcoholic drinks.  This medicine will cause constipation. Try to have a bowel movement at least every 2 to 3 days. If you do not have a bowel movement for 3 days, call your doctor or  health care professional.  Your mouth may get dry. Chewing sugarless gum or sucking hard candy, and drinking plenty of water may help. Contact your doctor if the problem does not go away or is severe.  NOTE:This sheet is a summary. It may not cover all possible information. If you have questions about this medicine, talk to your doctor, pharmacist, or health care provider. Copyright© 2017 [employer]

## 2020-04-20 ENCOUNTER — TELEPHONE (OUTPATIENT)
Dept: PULMONOLOGY | Facility: CLINIC | Age: 60
End: 2020-04-20

## 2020-04-20 NOTE — TELEPHONE ENCOUNTER
----- Message from Susie Whatley sent at 4/20/2020  2:50 PM CDT -----  Contact: [facility]it [facility]  Please call [facility] @ [phone] regarding pt, states pt need oxygen refill for home, walk test.

## 2020-04-23 ENCOUNTER — PATIENT MESSAGE (OUTPATIENT)
Dept: PALLIATIVE MEDICINE | Facility: CLINIC | Age: 60
End: 2020-04-23

## 2020-04-23 DIAGNOSIS — J44.9 STAGE 3 SEVERE COPD BY GOLD CLASSIFICATION: ICD-10-CM

## 2020-04-23 DIAGNOSIS — Z99.81 HYPOXEMIA REQUIRING SUPPLEMENTAL OXYGEN: ICD-10-CM

## 2020-04-23 DIAGNOSIS — G89.4 CHRONIC PAIN SYNDROME: ICD-10-CM

## 2020-04-23 DIAGNOSIS — R09.02 HYPOXEMIA REQUIRING SUPPLEMENTAL OXYGEN: ICD-10-CM

## 2020-04-23 RX ORDER — HYDROCODONE BITARTRATE AND ACETAMINOPHEN 10; 325 MG/1; MG/1
1 TABLET ORAL EVERY 6 HOURS PRN
Qty: 120 TABLET | Refills: 0 | OUTPATIENT
Start: 2020-04-23

## 2020-04-24 ENCOUNTER — PATIENT MESSAGE (OUTPATIENT)
Dept: PALLIATIVE MEDICINE | Facility: CLINIC | Age: 60
End: 2020-04-24

## 2020-04-24 DIAGNOSIS — J44.9 STAGE 3 SEVERE COPD BY GOLD CLASSIFICATION: ICD-10-CM

## 2020-04-24 DIAGNOSIS — Z99.81 HYPOXEMIA REQUIRING SUPPLEMENTAL OXYGEN: ICD-10-CM

## 2020-04-24 DIAGNOSIS — G89.4 CHRONIC PAIN SYNDROME: ICD-10-CM

## 2020-04-24 DIAGNOSIS — R09.02 HYPOXEMIA REQUIRING SUPPLEMENTAL OXYGEN: ICD-10-CM

## 2020-04-27 RX ORDER — HYDROCODONE BITARTRATE AND ACETAMINOPHEN 10; 325 MG/1; MG/1
1 TABLET ORAL EVERY 6 HOURS PRN
Qty: 120 TABLET | Refills: 0 | Status: SHIPPED | OUTPATIENT
Start: 2020-04-27 | End: 2020-05-20 | Stop reason: SDUPTHER

## 2020-04-30 ENCOUNTER — OFFICE VISIT (OUTPATIENT)
Dept: PALLIATIVE MEDICINE | Facility: CLINIC | Age: 60
End: 2020-04-30
Payer: MEDICAID

## 2020-04-30 ENCOUNTER — PATIENT MESSAGE (OUTPATIENT)
Dept: PALLIATIVE MEDICINE | Facility: CLINIC | Age: 60
End: 2020-04-30

## 2020-04-30 DIAGNOSIS — R06.00 DYSPNEA, UNSPECIFIED TYPE: ICD-10-CM

## 2020-04-30 DIAGNOSIS — J44.9 STAGE 3 SEVERE COPD BY GOLD CLASSIFICATION: ICD-10-CM

## 2020-04-30 PROCEDURE — 99214 OFFICE O/P EST MOD 30 MIN: CPT | Mod: 95,,, | Performed by: FAMILY MEDICINE

## 2020-04-30 PROCEDURE — 99214 PR OFFICE/OUTPT VISIT, EST, LEVL IV, 30-39 MIN: ICD-10-PCS | Mod: 95,,, | Performed by: FAMILY MEDICINE

## 2020-04-30 RX ORDER — MORPHINE SULFATE 15 MG/1
15 TABLET, FILM COATED, EXTENDED RELEASE ORAL 2 TIMES DAILY
Qty: 30 TABLET | Refills: 0 | Status: CANCELLED | OUTPATIENT
Start: 2020-04-30 | End: 2020-05-15

## 2020-04-30 RX ORDER — MORPHINE SULFATE 15 MG/1
15 TABLET, FILM COATED, EXTENDED RELEASE ORAL EVERY 12 HOURS
Qty: 60 TABLET | Refills: 0 | Status: SHIPPED | OUTPATIENT
Start: 2020-04-30 | End: 2020-05-20 | Stop reason: SDUPTHER

## 2020-05-02 ENCOUNTER — PATIENT MESSAGE (OUTPATIENT)
Dept: SLEEP MEDICINE | Facility: CLINIC | Age: 60
End: 2020-05-02

## 2020-05-02 ENCOUNTER — PATIENT MESSAGE (OUTPATIENT)
Dept: PALLIATIVE MEDICINE | Facility: CLINIC | Age: 60
End: 2020-05-02

## 2020-05-02 DIAGNOSIS — B35.3 TINEA PEDIS OF RIGHT FOOT: ICD-10-CM

## 2020-05-04 ENCOUNTER — PATIENT MESSAGE (OUTPATIENT)
Dept: PALLIATIVE MEDICINE | Facility: CLINIC | Age: 60
End: 2020-05-04

## 2020-05-04 RX ORDER — MIRTAZAPINE 30 MG/1
30 TABLET, FILM COATED ORAL NIGHTLY
Qty: 30 TABLET | Refills: 11 | Status: SHIPPED | OUTPATIENT
Start: 2020-05-04 | End: 2021-06-04

## 2020-05-04 RX ORDER — KETOCONAZOLE 20 MG/G
CREAM TOPICAL DAILY
Qty: 30 G | Refills: 1 | Status: SHIPPED | OUTPATIENT
Start: 2020-05-04 | End: 2021-02-21

## 2020-05-06 ENCOUNTER — PATIENT MESSAGE (OUTPATIENT)
Dept: PALLIATIVE MEDICINE | Facility: CLINIC | Age: 60
End: 2020-05-06

## 2020-05-06 DIAGNOSIS — R05.3 COUGH, PERSISTENT: ICD-10-CM

## 2020-05-07 RX ORDER — PROMETHAZINE HYDROCHLORIDE 6.25 MG/5ML
12.5 SYRUP ORAL NIGHTLY PRN
Qty: 240 ML | Refills: 6 | Status: SHIPPED | OUTPATIENT
Start: 2020-05-07 | End: 2020-05-07

## 2020-05-07 RX ORDER — PROMETHAZINE HYDROCHLORIDE 6.25 MG/5ML
12.5 SYRUP ORAL NIGHTLY PRN
Qty: 240 ML | Refills: 6 | Status: SHIPPED | OUTPATIENT
Start: 2020-05-07 | End: 2020-05-20 | Stop reason: SDUPTHER

## 2020-05-18 ENCOUNTER — PATIENT MESSAGE (OUTPATIENT)
Dept: PALLIATIVE MEDICINE | Facility: CLINIC | Age: 60
End: 2020-05-18

## 2020-05-19 ENCOUNTER — PATIENT MESSAGE (OUTPATIENT)
Dept: PALLIATIVE MEDICINE | Facility: CLINIC | Age: 60
End: 2020-05-19

## 2020-05-20 ENCOUNTER — TELEPHONE (OUTPATIENT)
Dept: INTERNAL MEDICINE | Facility: CLINIC | Age: 60
End: 2020-05-20

## 2020-05-20 ENCOUNTER — OFFICE VISIT (OUTPATIENT)
Dept: PALLIATIVE MEDICINE | Facility: CLINIC | Age: 60
End: 2020-05-20
Payer: MEDICAID

## 2020-05-20 ENCOUNTER — PATIENT MESSAGE (OUTPATIENT)
Dept: SLEEP MEDICINE | Facility: CLINIC | Age: 60
End: 2020-05-20

## 2020-05-20 DIAGNOSIS — Z99.81 HYPOXEMIA REQUIRING SUPPLEMENTAL OXYGEN: ICD-10-CM

## 2020-05-20 DIAGNOSIS — R06.00 DYSPNEA, UNSPECIFIED TYPE: ICD-10-CM

## 2020-05-20 DIAGNOSIS — G89.4 CHRONIC PAIN SYNDROME: ICD-10-CM

## 2020-05-20 DIAGNOSIS — R09.02 HYPOXEMIA REQUIRING SUPPLEMENTAL OXYGEN: ICD-10-CM

## 2020-05-20 DIAGNOSIS — J44.9 STAGE 3 SEVERE COPD BY GOLD CLASSIFICATION: ICD-10-CM

## 2020-05-20 PROCEDURE — 99214 OFFICE O/P EST MOD 30 MIN: CPT | Mod: 95,,, | Performed by: FAMILY MEDICINE

## 2020-05-20 PROCEDURE — 99214 PR OFFICE/OUTPT VISIT, EST, LEVL IV, 30-39 MIN: ICD-10-PCS | Mod: 95,,, | Performed by: FAMILY MEDICINE

## 2020-05-20 RX ORDER — PROMETHAZINE HYDROCHLORIDE 6.25 MG/5ML
6.25 SYRUP ORAL EVERY 6 HOURS PRN
Qty: 240 ML | Refills: 6 | Status: SHIPPED | OUTPATIENT
Start: 2020-05-20 | End: 2020-05-20 | Stop reason: SDUPTHER

## 2020-05-20 RX ORDER — MORPHINE SULFATE 15 MG/1
15 TABLET, FILM COATED, EXTENDED RELEASE ORAL EVERY 12 HOURS
Qty: 60 TABLET | Refills: 0 | Status: SHIPPED | OUTPATIENT
Start: 2020-05-20 | End: 2020-05-20 | Stop reason: SDUPTHER

## 2020-05-20 RX ORDER — MORPHINE SULFATE 15 MG/1
15 TABLET, FILM COATED, EXTENDED RELEASE ORAL EVERY 12 HOURS
Qty: 60 TABLET | Refills: 0 | Status: SHIPPED | OUTPATIENT
Start: 2020-05-20 | End: 2020-06-22 | Stop reason: SDUPTHER

## 2020-05-20 RX ORDER — HYDROCODONE BITARTRATE AND ACETAMINOPHEN 10; 325 MG/1; MG/1
1 TABLET ORAL EVERY 6 HOURS PRN
Qty: 120 TABLET | Refills: 0 | Status: SHIPPED | OUTPATIENT
Start: 2020-05-20 | End: 2020-05-20 | Stop reason: SDUPTHER

## 2020-05-20 RX ORDER — PROMETHAZINE HYDROCHLORIDE 6.25 MG/5ML
6.25 SYRUP ORAL EVERY 6 HOURS PRN
Qty: 240 ML | Refills: 6 | Status: SHIPPED | OUTPATIENT
Start: 2020-05-20 | End: 2020-08-03

## 2020-05-20 RX ORDER — HYDROCODONE BITARTRATE AND ACETAMINOPHEN 10; 325 MG/1; MG/1
1 TABLET ORAL EVERY 6 HOURS PRN
Qty: 120 TABLET | Refills: 0 | Status: SHIPPED | OUTPATIENT
Start: 2020-05-20 | End: 2020-06-22 | Stop reason: SDUPTHER

## 2020-05-20 NOTE — PROGRESS NOTES
Subjective:       Patient ID: Sarah Monahan is a 60 y.o. female.    Chief Complaint: palliative f/u    The patient location is: [place]  The chief complaint leading to consultation is: palliative    Visit type: audiovisual    Face to Face time with patient: 20 minutes  30 minutes of total time spent on the encounter, which includes face to face time and non-face to face time preparing to see the patient (eg, review of tests), Obtaining and/or reviewing separately obtained history, Documenting clinical information in the electronic or other health record, Independently interpreting results (not separately reported) and communicating results to the patient/family/caregiver, or Care coordination (not separately reported).         Each patient to whom he or she provides medical services by telemedicine is:  (1) informed of the relationship between the physician and patient and the respective role of any other health care provider with respect to management of the patient; and (2) notified that he or she may decline to receive medical services by telemedicine and may withdraw from such care at any time.    Notes: 61 yo female seen for palliative medicine follow-up. She reports worsening COPD symptoms of dypsnea, cough and wheeze. Has not had recent f/u with pulmonology and states she'll call to schedule. She has had relief with MS Contin but continues to take PRN hydrocodone. Has been taking phenergan as well for nausea she states often accompanies her coughing fits. She maintains ability to perform ADLs independently. Tolerates short periods off of O2 but would like portable O2 option; she will discuss with pulmonology.  reviewed and consistent with patient reports.     does not have any pertinent problems on file.  Past Medical History:   Diagnosis Date    Acid reflux     Anemia     Asthma     Bronchitis chronic     Diabetes mellitus     pt states she's not a diabetic    DVT, lower extremity     RLE     Emphysema of lung     Hepatitis B     Hepatitis C     Herniated disc     Hyperlipidemia     Kidney stone     Lung disease     Oxygen dependent     nightly    Supraventricular tachycardia     Urinary tract infection      Past Surgical History:   Procedure Laterality Date    abdominal laparotomy      BREAST SURGERY Right     lumpectomy x 2 benign    CERVICAL FUSION      C4 & C5     SECTION, CLASSIC      CHOLECYSTECTOMY      CYSTOSCOPY W/ LASER LITHOTRIPSY      HERNIA REPAIR      KNEE ARTHROSCOPY W/ ACL RECONSTRUCTION      L knee    orif knee Right     SALPINGECTOMY Right      Family History   Problem Relation Age of Onset    Cancer Mother     Diabetes Father     Hypertension Father     Heart disease Father     Heart attack Father 60        AMI- CABG    Cancer Maternal Grandmother     Diabetes Paternal Grandmother     Heart failure Paternal Grandmother      Social History     Socioeconomic History    Marital status:      Spouse name: Not on file    Number of children: Not on file    Years of education: Not on file    Highest education level: Not on file   Occupational History    Not on file   Social Needs    Financial resource strain: Not on file    Food insecurity:     Worry: Not on file     Inability: Not on file    Transportation needs:     Medical: Not on file     Non-medical: Not on file   Tobacco Use    Smoking status: Current Every Day Smoker     Packs/day: 1.00     Years: 40.00     Pack years: 40.00     Last attempt to quit: 2015     Years since quittin.9    Smokeless tobacco: Never Used    Tobacco comment: no smoking after m.n prior to sx   Substance and Sexual Activity    Alcohol use: No     Alcohol/week: 0.0 standard drinks    Drug use: No    Sexual activity: Yes     Partners: Male   Lifestyle    Physical activity:     Days per week: Not on file     Minutes per session: Not on file    Stress: Not on file   Relationships    Social  connections:     Talks on phone: Not on file     Gets together: Not on file     Attends Rastafari service: Not on file     Active member of club or organization: Not on file     Attends meetings of clubs or organizations: Not on file     Relationship status: Not on file   Other Topics Concern    Not on file   Social History Narrative    Not on file     Review of Systems   A comprehensive 14-point review of systems was reviewed with patient and was negative other than as specified above.     Objective:   There were no vitals filed for this visit.     Physical Exam   Constitutional: She is oriented to person, place, and time. She appears well-developed. No distress.   Ill appearing   HENT:   Head: Normocephalic and atraumatic.   Eyes: EOM are normal. No scleral icterus.   Neck: Normal range of motion. Neck supple.   Pulmonary/Chest: No respiratory distress.   Dyspnea noted with prolonged speaking   Musculoskeletal: Normal range of motion.   Neurological: She is alert and oriented to person, place, and time.   Skin: No rash noted.   Psychiatric: She has a normal mood and affect. Her behavior is normal. Thought content normal.       Assessment:       1. Chronic pain syndrome    2. Hypoxemia requiring supplemental oxygen    3. Stage 3 severe COPD by GOLD classification    4. Dyspnea, unspecified type    5. Stage 3 severe COPD by GOLD classification        Plan:           Problem List Items Addressed This Visit        Neuro    Chronic pain    Relevant Medications    HYDROcodone-acetaminophen (NORCO)  mg per tablet       Pulmonary    Hypoxemia requiring supplemental oxygen    Overview     Benefits from use.         Relevant Medications    HYDROcodone-acetaminophen (NORCO)  mg per tablet    Stage 3 severe COPD by GOLD classification    Overview     Stiolto, Flovent, Duoneb, Albuterol, oxygen         Relevant Medications    HYDROcodone-acetaminophen (NORCO)  mg per tablet    morphine (MS CONTIN) 15 MG 12  hr tablet       Other    Dyspnea    Relevant Medications    morphine (MS CONTIN) 15 MG 12 hr tablet

## 2020-05-23 ENCOUNTER — PATIENT MESSAGE (OUTPATIENT)
Dept: PALLIATIVE MEDICINE | Facility: CLINIC | Age: 60
End: 2020-05-23

## 2020-05-23 ENCOUNTER — PATIENT MESSAGE (OUTPATIENT)
Dept: SLEEP MEDICINE | Facility: CLINIC | Age: 60
End: 2020-05-23

## 2020-05-24 ENCOUNTER — PATIENT MESSAGE (OUTPATIENT)
Dept: PALLIATIVE MEDICINE | Facility: CLINIC | Age: 60
End: 2020-05-24

## 2020-05-24 ENCOUNTER — PATIENT MESSAGE (OUTPATIENT)
Dept: SLEEP MEDICINE | Facility: CLINIC | Age: 60
End: 2020-05-24

## 2020-05-25 DIAGNOSIS — J44.9 STAGE 3 SEVERE COPD BY GOLD CLASSIFICATION: Primary | ICD-10-CM

## 2020-05-26 RX ORDER — AZITHROMYCIN 500 MG/1
500 TABLET, FILM COATED ORAL DAILY
Qty: 3 TABLET | Refills: 2 | Status: SHIPPED | OUTPATIENT
Start: 2020-05-26 | End: 2020-09-08 | Stop reason: SDUPTHER

## 2020-05-26 RX ORDER — MONTELUKAST SODIUM 10 MG/1
10 TABLET ORAL NIGHTLY
Qty: 30 TABLET | Refills: 11 | Status: SHIPPED | OUTPATIENT
Start: 2020-05-26 | End: 2020-09-24 | Stop reason: SDUPTHER

## 2020-05-29 ENCOUNTER — PATIENT MESSAGE (OUTPATIENT)
Dept: SLEEP MEDICINE | Facility: CLINIC | Age: 60
End: 2020-05-29

## 2020-05-29 DIAGNOSIS — F17.200 TOBACCO DEPENDENCE: Chronic | ICD-10-CM

## 2020-05-29 RX ORDER — VARENICLINE TARTRATE 0.5 (11)-1
KIT ORAL
Qty: 1 PACKAGE | Refills: 0 | Status: SHIPPED | OUTPATIENT
Start: 2020-05-29 | End: 2020-09-08

## 2020-05-29 RX ORDER — VARENICLINE TARTRATE 0.5 (11)-1
KIT ORAL
Qty: 53 TABLET | Refills: 0 | Status: SHIPPED | OUTPATIENT
Start: 2020-05-29 | End: 2020-09-08

## 2020-06-15 ENCOUNTER — OFFICE VISIT (OUTPATIENT)
Dept: PALLIATIVE MEDICINE | Facility: CLINIC | Age: 60
End: 2020-06-15
Payer: MEDICAID

## 2020-06-15 ENCOUNTER — TELEPHONE (OUTPATIENT)
Dept: INTERNAL MEDICINE | Facility: CLINIC | Age: 60
End: 2020-06-15

## 2020-06-15 DIAGNOSIS — J44.9 STAGE 3 SEVERE COPD BY GOLD CLASSIFICATION: ICD-10-CM

## 2020-06-15 DIAGNOSIS — G89.4 CHRONIC PAIN SYNDROME: ICD-10-CM

## 2020-06-15 DIAGNOSIS — R06.00 DYSPNEA, UNSPECIFIED TYPE: ICD-10-CM

## 2020-06-15 DIAGNOSIS — Z99.81 HYPOXEMIA REQUIRING SUPPLEMENTAL OXYGEN: ICD-10-CM

## 2020-06-15 DIAGNOSIS — R09.02 HYPOXEMIA REQUIRING SUPPLEMENTAL OXYGEN: ICD-10-CM

## 2020-06-15 PROCEDURE — 99213 OFFICE O/P EST LOW 20 MIN: CPT | Mod: 95,,, | Performed by: FAMILY MEDICINE

## 2020-06-15 PROCEDURE — 99213 PR OFFICE/OUTPT VISIT, EST, LEVL III, 20-29 MIN: ICD-10-PCS | Mod: 95,,, | Performed by: FAMILY MEDICINE

## 2020-06-15 NOTE — PROGRESS NOTES
Established Patient - Audio Only Telehealth Visit     The patient location is: [place]  The chief complaint leading to consultation is: palliative f/u  Visit type: Virtual visit with audio only (telephone)  Total time spent with patient: 25       The reason for the audio only service rather than synchronous audio and video virtual visit was related to technical difficulties or patient preference/necessity.     Each patient to whom I provide medical services by telemedicine is:  (1) informed of the relationship between the physician and patient and the respective role of any other health care provider with respect to management of the patient; and (2) notified that they may decline to receive medical services by telemedicine and may withdraw from such care at any time. Patient verbally consented to receive this service via voice-only telephone call.       HPI: 59 yo female with advanced COPD seen for palliative follow up. She reports worsening shortness of breath making it difficult to get around. Wearing her oxygen majority of time now. Has had worsening upper back pain which she thinks may be due to sitting down more. She has not had any success with smoking cessation.; reports nausea and vomiting with Chantix. Does desire to quit and knows this is making her more symptomatic.      Assessment and plan:    Continue current medication. She exhibits some drug seeking behavior; requested norco be switched to oxycodone. Will get signed pain contract and UDS at next visit. Continue current medication for now.                          This service was not originating from a related E/M service provided within the previous 7 days nor will  to an E/M service or procedure within the next 24 hours or my soonest available appointment.  Prevailing standard of care was able to be met in this audio-only visit.

## 2020-06-22 ENCOUNTER — TELEPHONE (OUTPATIENT)
Dept: PULMONOLOGY | Facility: CLINIC | Age: 60
End: 2020-06-22

## 2020-06-22 RX ORDER — MORPHINE SULFATE 15 MG/1
15 TABLET, FILM COATED, EXTENDED RELEASE ORAL EVERY 12 HOURS
Qty: 60 TABLET | Refills: 0 | Status: SHIPPED | OUTPATIENT
Start: 2020-06-22 | End: 2020-07-13 | Stop reason: SDUPTHER

## 2020-06-22 RX ORDER — HYDROCODONE BITARTRATE AND ACETAMINOPHEN 10; 325 MG/1; MG/1
1 TABLET ORAL EVERY 6 HOURS PRN
Qty: 120 TABLET | Refills: 0 | Status: SHIPPED | OUTPATIENT
Start: 2020-06-22 | End: 2020-07-13 | Stop reason: SDUPTHER

## 2020-06-22 NOTE — TELEPHONE ENCOUNTER
Initiated prior authorization request with patient's insurance for Chantix Starting Month Sabino 0.5 MG X 11 &1 MG X 42 tablets prescription. Submitted online via [email or web address] (request key [id number]). Awaiting decision -- PA case ID [id number].

## 2020-06-23 NOTE — TELEPHONE ENCOUNTER
"Denied - 6/23/20 - as per letter from payor. See "Authorization or Referral" under "Media" tab.    Notified both Dr. Toscano's staff via [employer] message and patient's pharmacy via fax.  "

## 2020-07-13 ENCOUNTER — OFFICE VISIT (OUTPATIENT)
Dept: PALLIATIVE MEDICINE | Facility: CLINIC | Age: 60
End: 2020-07-13
Payer: MEDICAID

## 2020-07-13 DIAGNOSIS — Z99.81 HYPOXEMIA REQUIRING SUPPLEMENTAL OXYGEN: ICD-10-CM

## 2020-07-13 DIAGNOSIS — J44.9 STAGE 3 SEVERE COPD BY GOLD CLASSIFICATION: ICD-10-CM

## 2020-07-13 DIAGNOSIS — F41.9 ANXIETY: Primary | ICD-10-CM

## 2020-07-13 DIAGNOSIS — R09.02 HYPOXEMIA REQUIRING SUPPLEMENTAL OXYGEN: ICD-10-CM

## 2020-07-13 DIAGNOSIS — G89.4 CHRONIC PAIN SYNDROME: ICD-10-CM

## 2020-07-13 DIAGNOSIS — R06.00 DYSPNEA, UNSPECIFIED TYPE: ICD-10-CM

## 2020-07-13 DIAGNOSIS — J44.1 COPD EXACERBATION: Primary | ICD-10-CM

## 2020-07-13 PROCEDURE — 99214 OFFICE O/P EST MOD 30 MIN: CPT | Mod: 95,,, | Performed by: FAMILY MEDICINE

## 2020-07-13 PROCEDURE — 99214 PR OFFICE/OUTPT VISIT, EST, LEVL IV, 30-39 MIN: ICD-10-PCS | Mod: 95,,, | Performed by: FAMILY MEDICINE

## 2020-07-13 RX ORDER — HYDROCODONE BITARTRATE AND ACETAMINOPHEN 10; 325 MG/1; MG/1
1 TABLET ORAL EVERY 6 HOURS PRN
Qty: 120 TABLET | Refills: 0 | Status: SHIPPED | OUTPATIENT
Start: 2020-07-13 | End: 2020-08-10 | Stop reason: SDUPTHER

## 2020-07-13 RX ORDER — PREDNISONE 20 MG/1
TABLET ORAL
Qty: 11 TABLET | Refills: 0 | Status: SHIPPED | OUTPATIENT
Start: 2020-07-13 | End: 2020-07-14 | Stop reason: SDUPTHER

## 2020-07-13 RX ORDER — MORPHINE SULFATE 15 MG/1
15 TABLET, FILM COATED, EXTENDED RELEASE ORAL EVERY 12 HOURS
Qty: 60 TABLET | Refills: 0 | Status: SHIPPED | OUTPATIENT
Start: 2020-07-13 | End: 2020-08-10 | Stop reason: SDUPTHER

## 2020-07-13 NOTE — PROGRESS NOTES
Subjective:       Patient ID: Sarah Monahan is a 60 y.o. female.    Chief Complaint: palliative f/u; pain and dyspnea    The patient location is: [place]  The chief complaint leading to consultation is: palliative f/u    Visit type: audiovisual    Face to Face time with patient: 30  40 minutes of total time spent on the encounter, which includes face to face time and non-face to face time preparing to see the patient (eg, review of tests), Obtaining and/or reviewing separately obtained history, Documenting clinical information in the electronic or other health record, Independently interpreting results (not separately reported) and communicating results to the patient/family/caregiver, or Care coordination (not separately reported).         Each patient to whom he or she provides medical services by telemedicine is:  (1) informed of the relationship between the physician and patient and the respective role of any other health care provider with respect to management of the patient; and (2) notified that he or she may decline to receive medical services by telemedicine and may withdraw from such care at any time.    Notes: Patient seen for f/u of dyspnea and chronic pain. She has advanced COPD; has been unable to quit smoking despite trying with Chantix. Restarted Singulair due to worsening shortness of breath. She has next PFT and pulmonary visit on 7/29/20. Feels she needs a course of steroids.       does not have any pertinent problems on file.  Past Medical History:   Diagnosis Date    Acid reflux     Anemia     Asthma     Bronchitis chronic     Diabetes mellitus     pt states she's not a diabetic    DVT, lower extremity     RLE    Emphysema of lung     Hepatitis B     Hepatitis C     Herniated disc     Hyperlipidemia     Kidney stone     Lung disease     Oxygen dependent     nightly    Supraventricular tachycardia     Urinary tract infection      Past Surgical History:   Procedure Laterality  Date    abdominal laparotomy      BREAST SURGERY Right     lumpectomy x 2 benign    CERVICAL FUSION      C4 & C5     SECTION, CLASSIC      CHOLECYSTECTOMY      CYSTOSCOPY W/ LASER LITHOTRIPSY      HERNIA REPAIR      KNEE ARTHROSCOPY W/ ACL RECONSTRUCTION      L knee    orif knee Right     SALPINGECTOMY Right      Family History   Problem Relation Age of Onset    Cancer Mother     Diabetes Father     Hypertension Father     Heart disease Father     Heart attack Father 60        AMI- CABG    Cancer Maternal Grandmother     Diabetes Paternal Grandmother     Heart failure Paternal Grandmother      Social History     Socioeconomic History    Marital status:      Spouse name: Not on file    Number of children: Not on file    Years of education: Not on file    Highest education level: Not on file   Occupational History    Not on file   Social Needs    Financial resource strain: Not on file    Food insecurity     Worry: Not on file     Inability: Not on file    Transportation needs     Medical: Not on file     Non-medical: Not on file   Tobacco Use    Smoking status: Current Every Day Smoker     Packs/day: 1.00     Years: 40.00     Pack years: 40.00     Last attempt to quit: 2015     Years since quittin.1    Smokeless tobacco: Never Used    Tobacco comment: no smoking after m.n prior to sx   Substance and Sexual Activity    Alcohol use: No     Alcohol/week: 0.0 standard drinks    Drug use: No    Sexual activity: Yes     Partners: Male   Lifestyle    Physical activity     Days per week: Not on file     Minutes per session: Not on file    Stress: Not on file   Relationships    Social connections     Talks on phone: Not on file     Gets together: Not on file     Attends Worship service: Not on file     Active member of club or organization: Not on file     Attends meetings of clubs or organizations: Not on file     Relationship status: Not on file   Other Topics  Concern    Not on file   Social History Narrative    Not on file     Review of Systems   A comprehensive 14-point review of systems was reviewed with patient and was negative other than as specified above.     Objective:   There were no vitals filed for this visit.     Physical Exam  Constitutional:       General: She is not in acute distress.     Appearance: She is well-developed.   HENT:      Head: Normocephalic and atraumatic.   Eyes:      General: No scleral icterus.  Neck:      Musculoskeletal: Normal range of motion and neck supple.   Pulmonary:      Effort: Pulmonary effort is normal. No respiratory distress.   Musculoskeletal: Normal range of motion.   Skin:     Findings: No rash.   Neurological:      Mental Status: She is alert and oriented to person, place, and time.   Psychiatric:         Behavior: Behavior normal.         Thought Content: Thought content normal.         Review of Symptoms    Symptom Assessment (ESAS 0-10 Scale)  Pain:  5  Dyspnea:  7  Anxiety:  7  Nausea:  1  Depression:  4  Anorexia:  1  Fatigue:  6  Insomnia:  4  Restlessness:  0  Agitation:  0     CAM / Delirium:  Negative  Constipation:  Negative  Diarrhea:  Negative    Bowel Management Plan (BMP):  Yes            ECOG Performance Status Grade:  1 - Ambulates, capable of light work    Advanced Directives:  [place]:  Yes        Assessment:       1. Anxiety    2. Dyspnea, unspecified type    3. Stage 3 severe COPD by GOLD classification    4. Chronic pain syndrome    5. Hypoxemia requiring supplemental oxygen    6. Stage 3 severe COPD by GOLD classification        Plan:           Problem List Items Addressed This Visit        Neuro    Chronic pain    Relevant Medications    HYDROcodone-acetaminophen (NORCO)  mg per tablet       Psychiatric    Anxiety - Primary       Pulmonary    Hypoxemia requiring supplemental oxygen    Overview     Benefits from use.         Relevant Medications    HYDROcodone-acetaminophen (NORCO)  mg  per tablet    Stage 3 severe COPD by GOLD classification    Overview     Stiolto, Flovent, Duoneb, Albuterol, oxygen         Relevant Medications    morphine (MS CONTIN) 15 MG 12 hr tablet    HYDROcodone-acetaminophen (NORCO)  mg per tablet       Other    Dyspnea    Relevant Medications    morphine (MS CONTIN) 15 MG 12 hr tablet

## 2020-07-14 DIAGNOSIS — J44.1 COPD EXACERBATION: ICD-10-CM

## 2020-07-14 RX ORDER — PREDNISONE 20 MG/1
TABLET ORAL
Qty: 11 TABLET | Refills: 0 | Status: SHIPPED | OUTPATIENT
Start: 2020-07-14 | End: 2020-07-29 | Stop reason: SDUPTHER

## 2020-07-29 ENCOUNTER — OFFICE VISIT (OUTPATIENT)
Dept: SLEEP MEDICINE | Facility: CLINIC | Age: 60
End: 2020-07-29
Payer: MEDICAID

## 2020-07-29 ENCOUNTER — CLINICAL SUPPORT (OUTPATIENT)
Dept: PULMONOLOGY | Facility: CLINIC | Age: 60
End: 2020-07-29
Payer: MEDICAID

## 2020-07-29 VITALS
WEIGHT: 165.38 LBS | BODY MASS INDEX: 29.3 KG/M2 | SYSTOLIC BLOOD PRESSURE: 129 MMHG | HEIGHT: 63 IN | RESPIRATION RATE: 17 BRPM | OXYGEN SATURATION: 95 % | HEIGHT: 63 IN | WEIGHT: 165.38 LBS | HEART RATE: 109 BPM | DIASTOLIC BLOOD PRESSURE: 75 MMHG | BODY MASS INDEX: 29.3 KG/M2

## 2020-07-29 DIAGNOSIS — J44.9 STAGE 3 SEVERE COPD BY GOLD CLASSIFICATION: ICD-10-CM

## 2020-07-29 DIAGNOSIS — R09.02 HYPOXEMIA REQUIRING SUPPLEMENTAL OXYGEN: ICD-10-CM

## 2020-07-29 DIAGNOSIS — Z99.81 HYPOXEMIA REQUIRING SUPPLEMENTAL OXYGEN: ICD-10-CM

## 2020-07-29 DIAGNOSIS — F17.200 TOBACCO DEPENDENCE: Chronic | ICD-10-CM

## 2020-07-29 DIAGNOSIS — J96.21 ACUTE ON CHRONIC RESPIRATORY FAILURE WITH HYPOXIA: Primary | ICD-10-CM

## 2020-07-29 DIAGNOSIS — J44.1 COPD EXACERBATION: ICD-10-CM

## 2020-07-29 PROCEDURE — 99999 PR PBB SHADOW E&M-EST. PATIENT-LVL IV: CPT | Mod: PBBFAC,,, | Performed by: NURSE PRACTITIONER

## 2020-07-29 PROCEDURE — 99214 OFFICE O/P EST MOD 30 MIN: CPT | Mod: 25,S$PBB,, | Performed by: NURSE PRACTITIONER

## 2020-07-29 PROCEDURE — 94618 PULMONARY STRESS TESTING: CPT | Mod: PBBFAC

## 2020-07-29 PROCEDURE — 94618 PULMONARY STRESS TESTING: CPT | Mod: 26,S$PBB,, | Performed by: INTERNAL MEDICINE

## 2020-07-29 PROCEDURE — 99214 PR OFFICE/OUTPT VISIT, EST, LEVL IV, 30-39 MIN: ICD-10-PCS | Mod: 25,S$PBB,, | Performed by: NURSE PRACTITIONER

## 2020-07-29 PROCEDURE — 99214 OFFICE O/P EST MOD 30 MIN: CPT | Mod: PBBFAC,27 | Performed by: NURSE PRACTITIONER

## 2020-07-29 PROCEDURE — 99211 OFF/OP EST MAY X REQ PHY/QHP: CPT | Mod: PBBFAC

## 2020-07-29 PROCEDURE — 94618 PULMONARY STRESS TESTING: ICD-10-PCS | Mod: 26,S$PBB,, | Performed by: INTERNAL MEDICINE

## 2020-07-29 PROCEDURE — 99999 PR PBB SHADOW E&M-EST. PATIENT-LVL IV: ICD-10-PCS | Mod: PBBFAC,,, | Performed by: NURSE PRACTITIONER

## 2020-07-29 PROCEDURE — 99999 PR PBB SHADOW E&M-EST. PATIENT-LVL I: CPT | Mod: PBBFAC,,,

## 2020-07-29 PROCEDURE — 99999 PR PBB SHADOW E&M-EST. PATIENT-LVL I: ICD-10-PCS | Mod: PBBFAC,,,

## 2020-07-29 RX ORDER — FLUTICASONE PROPIONATE 220 UG/1
1 AEROSOL, METERED RESPIRATORY (INHALATION) 2 TIMES DAILY
Qty: 12 G | Refills: 12 | Status: SHIPPED | OUTPATIENT
Start: 2020-07-29 | End: 2020-09-24 | Stop reason: SDUPTHER

## 2020-07-29 RX ORDER — PREDNISONE 20 MG/1
TABLET ORAL
Qty: 11 TABLET | Refills: 0 | Status: SHIPPED | OUTPATIENT
Start: 2020-07-29 | End: 2020-09-08

## 2020-07-29 RX ORDER — FLUTICASONE PROPIONATE AND SALMETEROL 500; 50 UG/1; UG/1
1 POWDER RESPIRATORY (INHALATION) 2 TIMES DAILY
Qty: 60 EACH | Refills: 11 | Status: SHIPPED | OUTPATIENT
Start: 2020-07-29 | End: 2020-07-29

## 2020-07-29 NOTE — PROCEDURES
"[facility] Pul[facility]  Six Minute Walk     SUMMARY     Ordering Provider: Elizabeth LeJeune, NP   Interpreting Provider: Dr. Knight  Performing nurse/tech/RT: JOSE Parham  Diagnosis: COPD(Hypoxia)  Height: 5' 3" (160 cm)  Weight: 75 kg (165 lb 5.5 oz)  BMI (Calculated): 29.3   Patient Race:             Phase Oxygen Assessment Supplemental O2 Heart   Rate Blood Pressure Vipul Dyspnea Scale Rating   Resting 94 % Room Air 114 bpm 129/75 4   Exercise        Minute        1 92 % Room Air 117 bpm     2 91 % Room Air 140 bpm     3 92 % Room Air 140 bpm     4 93 % Room Air 140 bpm     5 91 % Room Air 150 bpm     6  90 % Room Air 145 bpm 189/80 7-8   Recovery        Minute        1 91 % Room Air 141 bpm     2 95 % Room Air 125 bpm     3 96 %   118 bpm     4 96 % Room Air 120 bpm 152/75 1     Six Minute Walk Summary  6MWT Status: completed with stops  Patient Reported: Leg pain, Dyspnea     Interpretation:  Did the patient stop or pause?: Yes  How many times did the patient stop or pause?: 1  Stop Time 1: 190  Restart Time 1: 240  Pause Time 1: 50 seconds                             Total Time Walked (Calculated): 310 seconds  Final Partial Lap Distance (feet): 150 feet  Total Distance Meters (Calculated): 350.52 meters  Predicted Distance Meters (Calculated): 497.61 meters  Percentage of Predicted (Calculated): 70.44  Peak VO2 (Calculated): 14.5  Mets: 4.14  Has The Patient Had a Previous Six Minute Walk Test?: Yes       Previous 6MWT Results  Has The Patient Had a Previous Six Minute Walk Test?: Yes  Date of Previous Test: 12/04/15  Total Time Walked: 360 seconds  Total Distance (meters): 365.76  Predicted Distance (meters): 537.55 meters  Percentage of Predicted: 68.04  Percent Change (Calculated): 0.04    "

## 2020-07-29 NOTE — PROGRESS NOTES
"Subjective:      Patient ID: Sarah Monahan is a 60 y.o. female.    Chief Complaint: COPD    HPI  Follow up for end stage COPD. FEV1 27% of predicted.   Following with pallative care.   She wears oxygen with sleep. She had evaluation with exertion today.   Medication includes Stiolto, Flovent   Still smoking. Chantix is not covered and too expensive. She is on patch. And smoking heavily.     Patient Active Problem List   Diagnosis    Hypoxemia requiring supplemental oxygen    Stage 3 severe COPD by GOLD classification    Insomnia    Depression    Osteopenia    Chest pain    Aortic atherosclerosis    Anxiety    Disc disorder of cervical region    Chronic pain    Low back pain    Disorder of intervertebral disc    Arthropathy of lumbar facet joint    Thoracic back pain    Osteoarthritis of thoracic spine    Tobacco dependence    Renal stone    Right ureteral stone    Chronic cough    Dyspnea    DNR (do not resuscitate)    Acid reflux    Acute on chronic respiratory failure with hypoxia    Chronic constipation    Deep venous thrombosis    Dysphagia    Inflamed seborrheic keratosis    Multiple benign nevi    Seborrheic keratoses    Swallowing painful    Type 2 diabetes mellitus without complication, without long-term current use of insulin    Unspecified astigmatism, bilateral    Vomiting         /75   Pulse 109   Resp 17   Ht 5' 3" (1.6 m)   Wt 75 kg (165 lb 5.5 oz)   SpO2 95%   BMI 29.29 kg/m²   Body mass index is 29.29 kg/m².    Review of Systems   Respiratory: Positive for cough, wheezing and dyspnea on extertion.    Psychiatric/Behavioral: Positive for sleep disturbance.   All other systems reviewed and are negative.    Objective:      Physical Exam  Constitutional:       Appearance: She is well-developed.   HENT:      Head: Normocephalic and atraumatic.      Mouth/Throat:      Comments: Wearing mask due to covid concerns  Neck:      Musculoskeletal: Normal range " of motion and neck supple.   Cardiovascular:      Rate and Rhythm: Normal rate and regular rhythm.      Heart sounds: No murmur. No gallop.    Pulmonary:      Breath sounds: Wheezing present.      Comments: Decreased BS.   Abdominal:      Palpations: Abdomen is soft. There is no mass.      Tenderness: There is no abdominal tenderness.   Musculoskeletal: Normal range of motion.   Skin:     General: Skin is warm and dry.   Neurological:      Mental Status: She is alert and oriented to person, place, and time.   Psychiatric:         Mood and Affect: Mood normal.         Behavior: Behavior normal.       Personal Diagnostic Review  Today:  Phase Oxygen Assessment Supplemental O2   Rate Blood Pressure Vipul Dyspnea Scale Rating   Resting 94 % Room Air 114 bpm 129/75 4   Exercise         Minute         1 92 % Room Air 117 bpm     2 91 % Room Air 140 bpm     3 92 % Room Air 140 bpm     4 93 % Room Air 140 bpm     5 91 % Room Air 150 bpm     6  90 % Room Air 145 bpm 189/80 7-8   Recovery         Minute         1 91 % Room Air 141 bpm     2 95 % Room Air 125 bpm     3 96 %   118 bpm     4 96 % Room Air 120 bpm 152/75 1       Results for orders placed or performed in visit on 09/18/19   Spirometry with/without bronchodilator   Result Value Ref Range    Interpretation         Flow volume loop demonstrate an obstructive defect.  Overall there is severe ventilatory impairment. ( FEV1 < 35% predicted)  Very severe obstruction. (FEV1/VC< LLN and FEV1< or equal to 35% predicted).  Significant decline in FVC compared to study performed 06/14/2019.      Pre FVC 1.96 (L) 2.39 - 3.87 L    Pre FEV1 0.75 (L) 1.89 - 3.07 L    Pre FEV1 FVC 38.18 (L) 68.00 - 91.53 %    Pre FEF 25 75 0.25 (L) 1.22 - 3.44 L/s    Pre PEF 2.57 (L) 4.64 - 7.95 L/s    Pre  14.81 sec    FVC Ref 3.13     FVC LLN 2.39     FVC Pre Ref 62.6 %    FEV1 Ref 2.48     FEV1 LLN 1.89     FEV1 Pre Ref 30.1 %    FEV1 FVC Ref 80     FEV1 FVC LLN 68     FEV1 FVC Pre Ref  47.9 %    FEF 25 75 Ref 2.33     FEF 25 75 LLN 1.22     FEF 25 75 Pre Ref 10.8 %    PEF Ref 6.29     PEF LLN 4.64     PEF Pre Ref 40.9 %       Results for orders placed during the hospital encounter of 01/15/20   X-Ray Chest PA And Lateral    Narrative EXAMINATION:  XR CHEST PA AND LATERAL    CLINICAL HISTORY:  Chronic obstructive pulmonary disease, unspecified    TECHNIQUE:  PA and lateral views of the chest were performed.    COMPARISON:  06/14/2019    FINDINGS:  The cardiac and mediastinal silhouettes appear within normal limits.   The lungs are clear bilaterally.  No acute osseous findings demonstrated.      Impression No acute findings.      Electronically signed by: Jose Lux MD  Date:    01/15/2020  Time:    12:52         Assessment:       1. Acute on chronic respiratory failure with hypoxia    2. Stage 3 severe COPD by GOLD classification    3. Hypoxemia requiring supplemental oxygen    4. Tobacco dependence    5. COPD exacerbation        Outpatient Encounter Medications as of 7/29/2020   Medication Sig Dispense Refill    albuterol (PROVENTIL/VENTOLIN HFA) 90 mcg/actuation inhaler Inhale 2 puffs into the lungs every 4 (four) hours as needed. Rescue 18 g 0    albuterol-ipratropium (DUO-NEB) 2.5 mg-0.5 mg/3 mL nebulizer solution Use 3-4 times daily for 3 days following discharge. Then use every 6 hours as needed for SOB and wheeze. 1 Box 0    atorvastatin (LIPITOR) 40 MG tablet Take 40 mg by mouth.      azithromycin (ZITHROMAX) 500 MG tablet Take 1 tablet (500 mg total) by mouth once daily. 3 tablet 2    blood sugar diagnostic Strp 1 each.      blood-glucose meter Misc Use to check blood sugar      CHANTIX STARTING MONTH BOX 0.5 mg (11)- 1 mg (42) tablet TAKE 1 TABLET BY MOUTH TWICE DAILY. TAKE AS DIRECTED 53 tablet 0    clonazePAM (KLONOPIN) 1 MG tablet Take 1 tablet (1 mg total) by mouth 2 (two) times daily. 60 tablet 3    docusate sodium (COLACE) 100 MG capsule Take 1 capsule (100 mg total)  by mouth 2 (two) times daily. 60 capsule 0    fluticasone propionate (FLOVENT HFA) 220 mcg/actuation inhaler Inhale 1 puff into the lungs 2 (two) times daily. Controller 12 g 12    HYDROcodone-acetaminophen (NORCO)  mg per tablet Take 1 tablet by mouth every 6 (six) hours as needed for Pain. Or worsening shortness of breath 120 tablet 0    inhalation device (VORTEX HOLDING CHAMBER) Use as directed for inhalation. For use with albuterol inhaler. 1 Device prn    ketoconazole (NIZORAL) 2 % cream APPLY TOPICALLY ONCE DAILY TO FEET 30 g 1    metFORMIN (GLUCOPHAGE-XR) 750 MG 24 hr tablet TAKE ONE TABLET BY MOUTH DAILY WITH DINNER  5    mirtazapine (REMERON) 30 MG tablet Take 1 tablet (30 mg total) by mouth every evening. 30 tablet 11    montelukast (SINGULAIR) 10 mg tablet Take 1 tablet (10 mg total) by mouth every evening. 30 tablet 11    morphine (MS CONTIN) 15 MG 12 hr tablet Take 1 tablet (15 mg total) by mouth every 12 (twelve) hours. 60 tablet 0    omeprazole (PRILOSEC) 20 MG capsule       potassium chloride (KLOR-CON) 10 MEQ TbSR Take 10 mEq by mouth 2 (two) times daily.  5    predniSONE (DELTASONE) 20 MG tablet 2 PO daily x 3 days then 1 po daily x 3 days then 1/2 po daily x 3 days then stop 11 tablet 0    promethazine (PHENERGAN) 6.25 mg/5 mL syrup Take 5 mLs (6.25 mg total) by mouth every 6 (six) hours as needed for Nausea. 240 mL 6    tiotropium-olodaterol (STIOLTO RESPIMAT) 2.5-2.5 mcg/actuation Mist Inhale 2 puffs into the lungs once daily. 4 g 11    varenicline (CHANTIX STARTING MONTH BOX) 0.5 mg (11)- 1 mg (42) tablet Take one 0.5mg tab by mouth once daily X3 days,then increase to one 0.5mg tab twice daily X4 days,then increase to one 1mg tab twice daily 1 Package 0    [DISCONTINUED] fluticasone propionate (FLOVENT HFA) 220 mcg/actuation inhaler Inhale 1 puff into the lungs 2 (two) times daily. Controller 12 g 0    [DISCONTINUED] fluticasone-salmeterol diskus inhaler 500-50 mcg Inhale  1 puff into the lungs 2 (two) times daily. Medically necessary. J44.9 60 each 11    [DISCONTINUED] predniSONE (DELTASONE) 20 MG tablet 2 PO daily x 3 days then 1 po daily x 3 days then 1/2 po daily x 3 days then stop 11 tablet 0     No facility-administered encounter medications on file as of 7/29/2020.      Orders Placed This Encounter   Procedures    Spirometry without Bronchodilator     Standing Status:   Future     Standing Expiration Date:   7/29/2021     Plan:        Problem List Items Addressed This Visit        Pulmonary    Hypoxemia requiring supplemental oxygen    Overview     Oxygen with sleep. Benefits from use.         Current Assessment & Plan     Did not qualify for portable oxygen.          Relevant Medications    fluticasone propionate (FLOVENT HFA) 220 mcg/actuation inhaler    Other Relevant Orders    Spirometry without Bronchodilator    Stage 3 severe COPD by GOLD classification    Overview     Stiolto, flovent Duoneb, Albuterol, oxygen         Current Assessment & Plan     Continue current pulmonary drug regimen.           Relevant Medications    predniSONE (DELTASONE) 20 MG tablet    fluticasone propionate (FLOVENT HFA) 220 mcg/actuation inhaler    Other Relevant Orders    Spirometry without Bronchodilator    Acute on chronic respiratory failure with hypoxia - Primary    Overview     Last Assessment & Plan:   History & Physical Patient required increased oxygen requirements in the ED to maintain O2 saturations.  Patient appear to be in somewhat in respiratory distress in the ED.  Patient still persistently wheezing after multiple nebulized treatments.  Believe primary  patient's respiratory failure is due to COPD.  Will discuss treatment plan later.  Also suspect patient may be undergoing viral bronchitis.  We will cover patient with antibiotics for now given her symptoms.    Discharge Summary     Follow-up continue treatment for COPD exacerbation. Respiratory panel negative. She is  currently on 2LNC with good O2 sats. She has home oxygen but does not have portable O2 and is asking about obtaining. Will have nurse check room and ambulatory O2 and also get PT evaluation.   She is followed by pulmonary at [facility].          Relevant Medications    fluticasone propionate (FLOVENT HFA) 220 mcg/actuation inhaler    Other Relevant Orders    Spirometry without Bronchodilator       Other    Tobacco dependence (Chronic)    Overview     Stop smoking         Current Assessment & Plan     NRT- patches. Chantix too expensive. Screening CT annually.         Relevant Medications    fluticasone propionate (FLOVENT HFA) 220 mcg/actuation inhaler    Other Relevant Orders    Spirometry without Bronchodilator      Other Visit Diagnoses     COPD exacerbation        Relevant Medications    predniSONE (DELTASONE) 20 MG tablet    fluticasone propionate (FLOVENT HFA) 220 mcg/actuation inhaler    Other Relevant Orders    Spirometry without Bronchodilator

## 2020-08-08 ENCOUNTER — NURSE TRIAGE (OUTPATIENT)
Dept: ADMINISTRATIVE | Facility: CLINIC | Age: 60
End: 2020-08-08

## 2020-08-09 NOTE — TELEPHONE ENCOUNTER
Pt reports blood glucose was 595 prior to phone call. Also reports rapid breathing. Advised, per protocol to be seen in the ED for further evaluation. She was given 911 precautions. She verbalizes understanding. Will have her  drive her.       Reason for Disposition   [1] Blood glucose > 240 mg/dl (13 mmol/l) AND [2] rapid breathing    Additional Information   Negative: Unconscious or difficult to awaken   Negative: Acting confused (e.g., disoriented, slurred speech)   Negative: Very weak (e.g., can't stand)   Negative: Sounds like a life-threatening emergency to the triager   Negative: [1] Vomiting AND [2] signs of dehydration (e.g., very dry mouth, lightheaded, etc.)    Protocols used: ST DIABETES - HIGH BLOOD SUGAR-A-

## 2020-08-10 ENCOUNTER — OFFICE VISIT (OUTPATIENT)
Dept: PALLIATIVE MEDICINE | Facility: CLINIC | Age: 60
End: 2020-08-10
Payer: MEDICAID

## 2020-08-10 DIAGNOSIS — Z99.81 HYPOXEMIA REQUIRING SUPPLEMENTAL OXYGEN: ICD-10-CM

## 2020-08-10 DIAGNOSIS — J44.9 STAGE 3 SEVERE COPD BY GOLD CLASSIFICATION: ICD-10-CM

## 2020-08-10 DIAGNOSIS — R09.02 HYPOXEMIA REQUIRING SUPPLEMENTAL OXYGEN: ICD-10-CM

## 2020-08-10 DIAGNOSIS — G89.4 CHRONIC PAIN SYNDROME: ICD-10-CM

## 2020-08-10 DIAGNOSIS — R06.00 DYSPNEA, UNSPECIFIED TYPE: ICD-10-CM

## 2020-08-10 DIAGNOSIS — Z72.0 TOBACCO ABUSE: Primary | ICD-10-CM

## 2020-08-10 PROCEDURE — 99214 OFFICE O/P EST MOD 30 MIN: CPT | Mod: 95,,, | Performed by: FAMILY MEDICINE

## 2020-08-10 PROCEDURE — 99214 PR OFFICE/OUTPT VISIT, EST, LEVL IV, 30-39 MIN: ICD-10-PCS | Mod: 95,,, | Performed by: FAMILY MEDICINE

## 2020-08-10 RX ORDER — MORPHINE SULFATE 15 MG/1
15 TABLET, FILM COATED, EXTENDED RELEASE ORAL EVERY 12 HOURS
Qty: 60 TABLET | Refills: 0 | Status: SHIPPED | OUTPATIENT
Start: 2020-08-10 | End: 2020-09-08 | Stop reason: SDUPTHER

## 2020-08-10 RX ORDER — HYDROCODONE BITARTRATE AND ACETAMINOPHEN 10; 325 MG/1; MG/1
1 TABLET ORAL EVERY 6 HOURS PRN
Qty: 120 TABLET | Refills: 0 | Status: SHIPPED | OUTPATIENT
Start: 2020-08-10 | End: 2020-09-08 | Stop reason: SDUPTHER

## 2020-08-10 NOTE — Clinical Note
Please schedule f/u in 1 month and let her know I put a referral into our smoking cessation program because I think she may be eligible for free Chantix.

## 2020-08-10 NOTE — PATIENT INSTRUCTIONS
Pain medications are useful tools in treating your pain, but there are side effects and risks to be aware of. Constipation is a frequent side effect. Be sure to have a normal bowel movement at least every 2-3 days. You can take Miralax up to 3 times daily, Senna tablets, or use glycerin suppositories to help. If none of this is effective, please let me know.     Pain medication can increase sedation or sleepiness and should not be mixed with alcohol or other medication that can cause drowsiness. Because of this, pain medication can also increase the risk of falls. Please exercise caution while on these medications.

## 2020-08-10 NOTE — PROGRESS NOTES
Subjective:       Patient ID: Sarah Monahan is a 60 y.o. female.    Chief Complaint: No chief complaint on file.    The patient location is: [place]  The chief complaint leading to consultation is: palliative f/u    Visit type: audiovisual    Face to Face time with patient: 20  40 minutes of total time spent on the encounter, which includes face to face time and non-face to face time preparing to see the patient (eg, review of tests), Obtaining and/or reviewing separately obtained history, Documenting clinical information in the electronic or other health record, Independently interpreting results (not separately reported) and communicating results to the patient/family/caregiver, or Care coordination (not separately reported).         Each patient to whom he or she provides medical services by telemedicine is:  (1) informed of the relationship between the physician and patient and the respective role of any other health care provider with respect to management of the patient; and (2) notified that he or she may decline to receive medical services by telemedicine and may withdraw from such care at any time.    Notes: 61 yo female with advanced COPD seen for palliative medicine follow up. She has seen pulmonology since our last meeting (7/29/20). She continues to have poor performance on pulmonary function testing and has been continued on her current regimen of medications and oxygen. She continues to smoke and has been unsuccessful in her attempts to quit. Discussed this with pulmonology at her last visit and will try nicotine patches.       does not have any pertinent problems on file.  Past Medical History:   Diagnosis Date    Acid reflux     Anemia     Asthma     Bronchitis chronic     Diabetes mellitus     pt states she's not a diabetic    DVT, lower extremity     RLE    Emphysema of lung     Hepatitis B     Hepatitis C     Herniated disc     Hyperlipidemia     Kidney stone     Lung disease      Oxygen dependent     nightly    Supraventricular tachycardia     Urinary tract infection      Past Surgical History:   Procedure Laterality Date    abdominal laparotomy      BREAST SURGERY Right     lumpectomy x 2 benign    CERVICAL FUSION      C4 & C5     SECTION, CLASSIC      CHOLECYSTECTOMY      CYSTOSCOPY W/ LASER LITHOTRIPSY      HERNIA REPAIR      KNEE ARTHROSCOPY W/ ACL RECONSTRUCTION      L knee    orif knee Right     SALPINGECTOMY Right      Family History   Problem Relation Age of Onset    Cancer Mother     Diabetes Father     Hypertension Father     Heart disease Father     Heart attack Father 60        [facility]- CABG    Cancer Maternal Grandmother     Diabetes Paternal Grandmother     Heart failure Paternal Grandmother      Social History     Socioeconomic History    Marital status:      Spouse name: Not on file    Number of children: Not on file    Years of education: Not on file    Highest education level: Not on file   Occupational History    Not on file   Social Needs    Financial resource strain: Not on file    Food insecurity     Worry: Not on file     Inability: Not on file    Transportation needs     Medical: Not on file     Non-medical: Not on file   Tobacco Use    Smoking status: Current Every Day Smoker     Packs/day: 1.00     Years: 40.00     Pack years: 40.00     Last attempt to quit: 2015     Years since quittin.2    Smokeless tobacco: Never Used    Tobacco comment: no smoking after m.n prior to sx   Substance and Sexual Activity    Alcohol use: No     Alcohol/week: 0.0 standard drinks    Drug use: No    Sexual activity: Yes     Partners: Male   Lifestyle    Physical activity     Days per week: Not on file     Minutes per session: Not on file    Stress: Not on file   Relationships    Social connections     Talks on phone: Not on file     Gets together: Not on file     Attends Hoahaoism service: Not on file     Active member of club  or organization: Not on file     Attends meetings of clubs or organizations: Not on file     Relationship status: Not on file   Other Topics Concern    Not on file   Social History Narrative    Not on file     Review of Systems   A comprehensive 14-point review of systems was reviewed with patient and was negative other than as specified above.     Objective:   There were no vitals filed for this visit.     Physical Exam  Constitutional:       General: She is not in acute distress.     Appearance: She is well-developed.   HENT:      Head: Normocephalic and atraumatic.   Eyes:      General: No scleral icterus.  Neck:      Musculoskeletal: Normal range of motion and neck supple.   Pulmonary:      Effort: Pulmonary effort is normal. No respiratory distress.   Musculoskeletal: Normal range of motion.   Skin:     Findings: No rash.   Neurological:      Mental Status: She is alert and oriented to person, place, and time.   Psychiatric:         Behavior: Behavior normal.         Thought Content: Thought content normal.         Review of Symptoms    Symptom Assessment (ESAS 0-10 Scale)  Pain:  3  Dyspnea:  7  Anxiety:  3  Nausea:  0  Depression:  2  Anorexia:  0  Fatigue:  6  Insomnia:  0  Restlessness:  0  Agitation:  0     CAM / Delirium:  Negative  Constipation:  Negative  Diarrhea:  Negative    Bowel Management Plan (BMP):  Yes            ECOG Performance Status Grade:  2 - Ambulates, capable of self care only    Advanced Directives:  [place]:  Yes      Decision Making:  Patient answered questions    Living Arrangements:  Lives with spouse      Assessment:       1. Dyspnea, unspecified type    2. Stage 3 severe COPD by GOLD classification    3. Chronic pain syndrome    4. Hypoxemia requiring supplemental oxygen        Plan:           Problem List Items Addressed This Visit        Neuro    Chronic pain    Relevant Medications    HYDROcodone-acetaminophen (NORCO)  mg per tablet       Pulmonary    Hypoxemia  requiring supplemental oxygen    Overview     Oxygen with sleep. Benefits from use.         Relevant Medications    HYDROcodone-acetaminophen (NORCO)  mg per tablet    Stage 3 severe COPD by GOLD classification    Overview     Stiolto, flovent Duoneb, Albuterol, oxygen         Relevant Medications    morphine (MS CONTIN) 15 MG 12 hr tablet    HYDROcodone-acetaminophen (NORCO)  mg per tablet       Other    Dyspnea    Relevant Medications    morphine (MS CONTIN) 15 MG 12 hr tablet      Continue current regimen; will place referral to smoking cessation program in case she may be eligible for free Chantix.

## 2020-08-14 ENCOUNTER — TELEPHONE (OUTPATIENT)
Dept: SMOKING CESSATION | Facility: CLINIC | Age: 60
End: 2020-08-14

## 2020-08-14 NOTE — TELEPHONE ENCOUNTER
Spoke with patient by telephone in regards to her scheduled appointment. She stated that she is too short of breath to conduct the call this morning. She requested to change her appointment to 8- at 3:30pm.

## 2020-08-17 ENCOUNTER — TELEPHONE (OUTPATIENT)
Dept: SMOKING CESSATION | Facility: CLINIC | Age: 60
End: 2020-08-17

## 2020-08-17 NOTE — TELEPHONE ENCOUNTER
Attempt to contact patient in regards to her scheduled appointment. No option to leave a recorded message.

## 2020-08-26 ENCOUNTER — TELEPHONE (OUTPATIENT)
Dept: PALLIATIVE MEDICINE | Facility: CLINIC | Age: 60
End: 2020-08-26

## 2020-08-26 NOTE — TELEPHONE ENCOUNTER
Palliative Care:    Patient called stating that her pharmacy states that Dr Salguero gave direction to not refill her Morphine until 8/28/20.  I reviewed the order and do not see any indication that this is accurate.  Patient states she has been having a lot of trouble with her pharmacy and is considering changing to another one.  I asked her to call back if she needs any further assistance.    Patricia Mak RN

## 2020-09-01 ENCOUNTER — TELEPHONE (OUTPATIENT)
Dept: SMOKING CESSATION | Facility: CLINIC | Age: 60
End: 2020-09-01

## 2020-09-03 ENCOUNTER — OFFICE VISIT (OUTPATIENT)
Dept: PALLIATIVE MEDICINE | Facility: CLINIC | Age: 60
End: 2020-09-03
Payer: MEDICAID

## 2020-09-03 DIAGNOSIS — R06.00 DYSPNEA, UNSPECIFIED TYPE: ICD-10-CM

## 2020-09-03 DIAGNOSIS — R09.02 HYPOXEMIA REQUIRING SUPPLEMENTAL OXYGEN: ICD-10-CM

## 2020-09-03 DIAGNOSIS — F41.9 ANXIETY: Primary | ICD-10-CM

## 2020-09-03 DIAGNOSIS — G89.4 CHRONIC PAIN SYNDROME: ICD-10-CM

## 2020-09-03 DIAGNOSIS — J44.9 STAGE 3 SEVERE COPD BY GOLD CLASSIFICATION: ICD-10-CM

## 2020-09-03 DIAGNOSIS — Z99.81 HYPOXEMIA REQUIRING SUPPLEMENTAL OXYGEN: ICD-10-CM

## 2020-09-03 PROCEDURE — 99214 OFFICE O/P EST MOD 30 MIN: CPT | Mod: 95,,, | Performed by: FAMILY MEDICINE

## 2020-09-03 PROCEDURE — 99214 PR OFFICE/OUTPT VISIT, EST, LEVL IV, 30-39 MIN: ICD-10-PCS | Mod: 95,,, | Performed by: FAMILY MEDICINE

## 2020-09-03 NOTE — PROGRESS NOTES
Subjective:       Patient ID: Sarah Monahan is a 60 y.o. female.    Chief Complaint: palliative f/u    The patient location is: [place]  The chief complaint leading to consultation is: palliative f/u    Visit type: audiovisual    Face to Face time with patient: 30  45 minutes of total time spent on the encounter, which includes face to face time and non-face to face time preparing to see the patient (eg, review of tests), Obtaining and/or reviewing separately obtained history, Documenting clinical information in the electronic or other health record, Independently interpreting results (not separately reported) and communicating results to the patient/family/caregiver, or Care coordination (not separately reported).         Each patient to whom he or she provides medical services by telemedicine is:  (1) informed of the relationship between the physician and patient and the respective role of any other health care provider with respect to management of the patient; and (2) notified that he or she may decline to receive medical services by telemedicine and may withdraw from such care at any time.    Notes: 61 yo female with advanced COPD seen for f/u. She reports that her breathing has been much worse the last few days; she's been reading on line and is worried that she may be dying. Reassurance given; we discussed indications to go to the hospital. She denies feeling a need to be seen in the ER but does admit she's felt anxious and is disappointed that she did not qualify for portable oxygen at the pulmonologist. She reports lots of heart racing with minimal exertion. She continues to smoke and has not yet spoken to the smoking cessation counselor due to feeling too short of breath for a long conversation on the day she was contacted by Chel Shirley.    does not have any pertinent problems on file.  Past Medical History:   Diagnosis Date    Acid reflux     Anemia     Asthma     Bronchitis chronic      Diabetes mellitus     pt states she's not a diabetic    DVT, lower extremity     RLE    Emphysema of lung     Hepatitis B     Hepatitis C     Herniated disc     Hyperlipidemia     Kidney stone     Lung disease     Oxygen dependent     nightly    Supraventricular tachycardia     Urinary tract infection      Past Surgical History:   Procedure Laterality Date    abdominal laparotomy      BREAST SURGERY Right     lumpectomy x 2 benign    CERVICAL FUSION      C4 & C5     SECTION, CLASSIC      CHOLECYSTECTOMY      CYSTOSCOPY W/ LASER LITHOTRIPSY      HERNIA REPAIR      KNEE ARTHROSCOPY W/ ACL RECONSTRUCTION      L knee    orif knee Right     SALPINGECTOMY Right      Family History   Problem Relation Age of Onset    Cancer Mother     Diabetes Father     Hypertension Father     Heart disease Father     Heart attack Father 60        [facility]- CABG    Cancer Maternal Grandmother     Diabetes Paternal Grandmother     Heart failure Paternal Grandmother      Social History     Socioeconomic History    Marital status:      Spouse name: Not on file    Number of children: Not on file    Years of education: Not on file    Highest education level: Not on file   Occupational History    Not on file   Social Needs    Financial resource strain: Not on file    Food insecurity     Worry: Not on file     Inability: Not on file    Transportation needs     Medical: Not on file     Non-medical: Not on file   Tobacco Use    Smoking status: Current Every Day Smoker     Packs/day: 1.00     Years: 40.00     Pack years: 40.00     Last attempt to quit: 2015     Years since quittin.2    Smokeless tobacco: Never Used    Tobacco comment: no smoking after m.n prior to sx   Substance and Sexual Activity    Alcohol use: No     Alcohol/week: 0.0 standard drinks    Drug use: No    Sexual activity: Yes     Partners: Male   Lifestyle    Physical activity     Days per week: Not on file     Minutes  per session: Not on file    Stress: Not on file   Relationships    Social connections     Talks on phone: Not on file     Gets together: Not on file     Attends Faith service: Not on file     Active member of club or organization: Not on file     Attends meetings of clubs or organizations: Not on file     Relationship status: Not on file   Other Topics Concern    Not on file   Social History Narrative    Not on file     Review of Systems   A comprehensive 14-point review of systems was reviewed with patient and was negative other than as specified above.     Objective:   There were no vitals filed for this visit.     Physical Exam  Constitutional:       General: She is not in acute distress.     Appearance: She is well-developed.   HENT:      Head: Normocephalic and atraumatic.   Eyes:      General: No scleral icterus.  Neck:      Musculoskeletal: Normal range of motion and neck supple.   Pulmonary:      Effort: No respiratory distress.      Comments: Mild increase in respiratory effort but able to speak easily in complete sentences.   Musculoskeletal: Normal range of motion.   Skin:     Findings: No rash.   Neurological:      Mental Status: She is alert and oriented to person, place, and time. Mental status is at baseline.   Psychiatric:         Behavior: Behavior normal.         Thought Content: Thought content normal.      Comments: Anxious mood         Review of Symptoms    Symptom Assessment (ESAS 0-10 Scale)  Pain:  0  Dyspnea:  7  Anxiety:  8  Nausea:  0  Depression:  3  Anorexia:  0  Fatigue:  8  Insomnia:  0  Restlessness:  0  Agitation:  0     CAM / Delirium:  Negative  Constipation:  Negative          ECOG Performance Status Grade:  1 - Ambulates, capable of light work    Living Arrangements:  Lives with spouse    Advance Care Planning   Advance Care Planning       Assessment:       1. Anxiety    2. Chronic pain syndrome    3. Hypoxemia requiring supplemental oxygen    4. Stage 3 severe COPD by  GOLD classification    5. Dyspnea, unspecified type        Plan:           Problem List Items Addressed This Visit        Neuro    Chronic pain    Relevant Medications    HYDROcodone-acetaminophen (NORCO)  mg per tablet       Psychiatric    Anxiety - Primary    Relevant Medications    clonazePAM (KLONOPIN) 1 MG tablet       Pulmonary    Hypoxemia requiring supplemental oxygen    Overview     Oxygen with sleep. Benefits from use.         Relevant Medications    HYDROcodone-acetaminophen (NORCO)  mg per tablet    Stage 3 severe COPD by GOLD classification    Overview     Stiolto, flovent Duoneb, Albuterol, oxygen         Relevant Medications    HYDROcodone-acetaminophen (NORCO)  mg per tablet    morphine (MS CONTIN) 15 MG 12 hr tablet       Other    Dyspnea    Relevant Medications    morphine (MS CONTIN) 15 MG 12 hr tablet      Medications refilled; provided with number to smoking cessation office and encouraged her to reschedule her consultation.

## 2020-09-08 DIAGNOSIS — J44.9 STAGE 3 SEVERE COPD BY GOLD CLASSIFICATION: ICD-10-CM

## 2020-09-08 RX ORDER — AZITHROMYCIN 500 MG/1
500 TABLET, FILM COATED ORAL DAILY
Qty: 3 TABLET | Refills: 2 | Status: SHIPPED | OUTPATIENT
Start: 2020-09-08 | End: 2020-09-24 | Stop reason: SDUPTHER

## 2020-09-08 RX ORDER — CLONAZEPAM 1 MG/1
1 TABLET ORAL 2 TIMES DAILY
Qty: 60 TABLET | Refills: 3 | Status: SHIPPED | OUTPATIENT
Start: 2020-09-08 | End: 2020-12-18

## 2020-09-08 RX ORDER — HYDROCODONE BITARTRATE AND ACETAMINOPHEN 10; 325 MG/1; MG/1
1 TABLET ORAL EVERY 6 HOURS PRN
Qty: 120 TABLET | Refills: 0 | Status: SHIPPED | OUTPATIENT
Start: 2020-09-08 | End: 2020-10-05 | Stop reason: SDUPTHER

## 2020-09-08 RX ORDER — MORPHINE SULFATE 15 MG/1
15 TABLET, FILM COATED, EXTENDED RELEASE ORAL EVERY 12 HOURS
Qty: 60 TABLET | Refills: 0 | Status: SHIPPED | OUTPATIENT
Start: 2020-09-08 | End: 2020-10-05 | Stop reason: SDUPTHER

## 2020-09-09 ENCOUNTER — TELEPHONE (OUTPATIENT)
Dept: SMOKING CESSATION | Facility: CLINIC | Age: 60
End: 2020-09-09

## 2020-09-09 NOTE — TELEPHONE ENCOUNTER
Attempt to call following a call back request from patient. No voicemail set up. No alternative numbers listed.

## 2020-09-21 ENCOUNTER — PATIENT MESSAGE (OUTPATIENT)
Dept: SLEEP MEDICINE | Facility: CLINIC | Age: 60
End: 2020-09-21

## 2020-09-21 ENCOUNTER — TELEPHONE (OUTPATIENT)
Dept: PULMONOLOGY | Facility: CLINIC | Age: 60
End: 2020-09-21

## 2020-09-21 DIAGNOSIS — J44.9 STAGE 3 SEVERE COPD BY GOLD CLASSIFICATION: Primary | ICD-10-CM

## 2020-09-24 ENCOUNTER — CLINICAL SUPPORT (OUTPATIENT)
Dept: PULMONOLOGY | Facility: CLINIC | Age: 60
End: 2020-09-24
Payer: MEDICAID

## 2020-09-24 ENCOUNTER — OFFICE VISIT (OUTPATIENT)
Dept: SLEEP MEDICINE | Facility: CLINIC | Age: 60
End: 2020-09-24
Payer: MEDICAID

## 2020-09-24 VITALS
HEIGHT: 63 IN | HEART RATE: 110 BPM | OXYGEN SATURATION: 96 % | DIASTOLIC BLOOD PRESSURE: 70 MMHG | BODY MASS INDEX: 29.84 KG/M2 | SYSTOLIC BLOOD PRESSURE: 120 MMHG | RESPIRATION RATE: 18 BRPM | WEIGHT: 168.44 LBS

## 2020-09-24 DIAGNOSIS — E11.9 TYPE 2 DIABETES MELLITUS WITHOUT COMPLICATION, WITHOUT LONG-TERM CURRENT USE OF INSULIN: ICD-10-CM

## 2020-09-24 DIAGNOSIS — Z99.81 HYPOXEMIA REQUIRING SUPPLEMENTAL OXYGEN: ICD-10-CM

## 2020-09-24 DIAGNOSIS — J96.21 ACUTE ON CHRONIC RESPIRATORY FAILURE WITH HYPOXIA: ICD-10-CM

## 2020-09-24 DIAGNOSIS — J44.9 STAGE 3 SEVERE COPD BY GOLD CLASSIFICATION: Primary | ICD-10-CM

## 2020-09-24 DIAGNOSIS — J44.9 STAGE 3 SEVERE COPD BY GOLD CLASSIFICATION: ICD-10-CM

## 2020-09-24 DIAGNOSIS — Z66 DNR (DO NOT RESUSCITATE): ICD-10-CM

## 2020-09-24 DIAGNOSIS — R09.02 HYPOXEMIA REQUIRING SUPPLEMENTAL OXYGEN: ICD-10-CM

## 2020-09-24 DIAGNOSIS — R05.3 CHRONIC COUGH: ICD-10-CM

## 2020-09-24 DIAGNOSIS — F17.200 TOBACCO DEPENDENCE: Chronic | ICD-10-CM

## 2020-09-24 DIAGNOSIS — I70.0 AORTIC ATHEROSCLEROSIS: ICD-10-CM

## 2020-09-24 LAB
ALLENS TEST: NORMAL
DELSYS: NORMAL
HCO3 UR-SCNC: 27.2 MMOL/L (ref 24–28)
MODE: NORMAL
PCO2 BLDA: 42.3 MMHG (ref 35–45)
PH SMN: 7.42 [PH] (ref 7.35–7.45)
PO2 BLDA: 87 MMHG (ref 80–100)
POC BE: 3 MMOL/L
POC SATURATED O2: 97 % (ref 95–100)
SAMPLE: NORMAL
SITE: NORMAL

## 2020-09-24 PROCEDURE — 99999 PR PBB SHADOW E&M-EST. PATIENT-LVL I: CPT | Mod: PBBFAC,,,

## 2020-09-24 PROCEDURE — 36600 PR WITHDRAWAL OF ARTERIAL BLOOD: ICD-10-PCS | Mod: 59,S$PBB,, | Performed by: INTERNAL MEDICINE

## 2020-09-24 PROCEDURE — 82803 BLOOD GASES ANY COMBINATION: CPT | Mod: PBBFAC

## 2020-09-24 PROCEDURE — 99999 PR PBB SHADOW E&M-EST. PATIENT-LVL I: ICD-10-PCS | Mod: PBBFAC,,,

## 2020-09-24 PROCEDURE — 94762 N-INVAS EAR/PLS OXIMTRY CONT: CPT | Mod: PBBFAC

## 2020-09-24 PROCEDURE — 99211 OFF/OP EST MAY X REQ PHY/QHP: CPT | Mod: PBBFAC,25

## 2020-09-24 PROCEDURE — 36600 WITHDRAWAL OF ARTERIAL BLOOD: CPT | Mod: PBBFAC

## 2020-09-24 PROCEDURE — 36600 WITHDRAWAL OF ARTERIAL BLOOD: CPT | Mod: 59,S$PBB,, | Performed by: INTERNAL MEDICINE

## 2020-09-24 PROCEDURE — 99999 PR PBB SHADOW E&M-EST. PATIENT-LVL V: CPT | Mod: PBBFAC,,, | Performed by: INTERNAL MEDICINE

## 2020-09-24 PROCEDURE — 99215 OFFICE O/P EST HI 40 MIN: CPT | Mod: PBBFAC,27 | Performed by: INTERNAL MEDICINE

## 2020-09-24 PROCEDURE — 99999 PR PBB SHADOW E&M-EST. PATIENT-LVL V: ICD-10-PCS | Mod: PBBFAC,,, | Performed by: INTERNAL MEDICINE

## 2020-09-24 PROCEDURE — 99214 OFFICE O/P EST MOD 30 MIN: CPT | Mod: 25,S$PBB,, | Performed by: INTERNAL MEDICINE

## 2020-09-24 PROCEDURE — 99214 PR OFFICE/OUTPT VISIT, EST, LEVL IV, 30-39 MIN: ICD-10-PCS | Mod: 25,S$PBB,, | Performed by: INTERNAL MEDICINE

## 2020-09-24 RX ORDER — TIOTROPIUM BROMIDE AND OLODATEROL 3.124; 2.736 UG/1; UG/1
2 SPRAY, METERED RESPIRATORY (INHALATION) DAILY
Qty: 4 G | Refills: 11 | Status: SHIPPED | OUTPATIENT
Start: 2020-09-24 | End: 2021-09-06 | Stop reason: SDUPTHER

## 2020-09-24 RX ORDER — ALBUTEROL SULFATE 90 UG/1
2 AEROSOL, METERED RESPIRATORY (INHALATION) EVERY 4 HOURS PRN
Qty: 18 G | Refills: 5 | Status: SHIPPED | OUTPATIENT
Start: 2020-09-24

## 2020-09-24 RX ORDER — FLUTICASONE PROPIONATE 220 UG/1
1 AEROSOL, METERED RESPIRATORY (INHALATION) 2 TIMES DAILY
Qty: 12 G | Refills: 12 | Status: SHIPPED | OUTPATIENT
Start: 2020-09-24 | End: 2021-09-24

## 2020-09-24 RX ORDER — METHYLPREDNISOLONE 4 MG/1
TABLET ORAL
Qty: 1 PACKAGE | Refills: 3 | Status: SHIPPED | OUTPATIENT
Start: 2020-09-24 | End: 2021-10-15

## 2020-09-24 RX ORDER — IPRATROPIUM BROMIDE AND ALBUTEROL SULFATE 2.5; .5 MG/3ML; MG/3ML
SOLUTION RESPIRATORY (INHALATION)
Qty: 1 BOX | Refills: 11 | Status: SHIPPED | OUTPATIENT
Start: 2020-09-24 | End: 2021-11-08

## 2020-09-24 RX ORDER — MONTELUKAST SODIUM 10 MG/1
10 TABLET ORAL NIGHTLY
Qty: 30 TABLET | Refills: 11 | Status: SHIPPED | OUTPATIENT
Start: 2020-09-24 | End: 2021-09-24

## 2020-09-24 RX ORDER — AZITHROMYCIN 500 MG/1
500 TABLET, FILM COATED ORAL DAILY
Qty: 3 TABLET | Refills: 3 | Status: SHIPPED | OUTPATIENT
Start: 2020-09-24 | End: 2021-09-06 | Stop reason: SDUPTHER

## 2020-09-24 NOTE — PROGRESS NOTES
Subjective:     Patient Active Problem List   Diagnosis    Hypoxemia requiring supplemental oxygen    Stage 3 severe COPD by GOLD classification    Insomnia    Depression    Osteopenia    Chest pain    Aortic atherosclerosis    Anxiety    Disc disorder of cervical region    Chronic pain    Low back pain    Disorder of intervertebral disc    Arthropathy of lumbar facet joint    Thoracic back pain    Osteoarthritis of thoracic spine    Tobacco dependence    Renal stone    Right ureteral stone    Chronic cough    Dyspnea    DNR (do not resuscitate)    Acid reflux    Acute on chronic respiratory failure with hypoxia    Chronic constipation    Deep venous thrombosis    Dysphagia    Inflamed seborrheic keratosis    Multiple benign nevi    Seborrheic keratoses    Swallowing painful    Type 2 diabetes mellitus without complication, without long-term current use of insulin    Unspecified astigmatism, bilateral    Vomiting     Immunization History   Administered Date(s) Administered    Influenza 09/10/2018    Influenza (FLUBLOK) - Quadrivalent - Recombinant - PF *Preferred* (egg allergy) 2018    Influenza - Quadrivalent 10/15/2014, 10/11/2016, 2017    Influenza - Quadrivalent - PF *Preferred* (6 months and older) 2017    Influenza - Trivalent (ADULT) 10/26/2005, 2006    Influenza - Trivalent - PF (ADULT) 10/26/2005, 2006    MMR 2000, 2010    Pneumococcal Conjugate - 13 Valent 2017    Pneumococcal Polysaccharide - 23 Valent 2005, 2010, 10/15/2014    Td (ADULT) 2000, 2016    Td - PF (ADULT) 2016    Tdap 2010    Tetanus 2016     Social History     Tobacco Use   Smoking Status Current Every Day Smoker    Packs/day: 1.00    Years: 40.00    Pack years: 40.00    Last attempt to quit: 2015    Years since quittin.3   Smokeless Tobacco Never Used   Tobacco Comment    no smoking after m.n  prior to sx   COPD Questionnaire  How often do you cough?: All of the time  How often do you have phlegm (mucus) in your chest?: All of the time  How often does your chest feel tight?: Some of the time  When you walk up a hill or one flight of stairs, how often are you breathless?: All of the time  How often are you limited doing any activities at home?: Most of the time  How often are you confident leaving the house despite your lung condition?: Some of the time  How often do you sleep soundly?: Almost never  How often do you have energy?: Almost never  Total score: 32        Sarah Monahan is a 60 y.o. female   LV 2020.  Here to review ABG and 6MWD  DME require O2 recertification  Normal ABG  Recent  COPD exacerbation.  Also followed by Dr Salguero: coping better  MRC score 1   Severe COPD home oxygen chronic cough tobacco dependency, osteopenia  Stable on her current regimen  Smoking triggers discussed:  Son in [facility], domestic issues  LDCT pending  Reviewed spirometry which still shows severe COPD with an FEV1 of 30.1%.  COPD sick plan meds given: Zpak and Medrol  Her meds refill and review for meds: STIOLTO respimat and FLOVENT.  VENTOLIN HFA.  Also has nebuliser: DUONEB  COPD Group C : High risk More Symptoms  Patient informed that tobacco abuse is a reversible risk factor for lung cancer causation             The following portions of the patient's history were reviewed and updated as appropriate:   She  has a past medical history of Acid reflux, Anemia, Asthma, Bronchitis chronic, Diabetes mellitus, DVT, lower extremity, Emphysema of lung, Hepatitis B, Hepatitis C, Herniated disc, Hyperlipidemia, Kidney stone, Lung disease, Oxygen dependent, Supraventricular tachycardia, and Urinary tract infection.  She does not have any pertinent problems on file.  She  has a past surgical history that includes Cervical fusion; Cholecystectomy; Knee arthroscopy w/ ACL reconstruction; Hernia repair;   section, classic; Salpingectomy (Right); Cystoscopy w/ laser lithotripsy; abdominal laparotomy; orif knee (Right); and Breast surgery (Right).  Her family history includes Cancer in her maternal grandmother and mother; Diabetes in her father and paternal grandmother; Heart attack (age of onset: 60) in her father; Heart disease in her father; Heart failure in her paternal grandmother; Hypertension in her father.  She  reports that she has been smoking. She has a 40.00 pack-year smoking history. She has never used smokeless tobacco. She reports that she does not drink alcohol or use drugs.  She has a current medication list which includes the following prescription(s): albuterol, albuterol-ipratropium, blood sugar diagnostic, blood-glucose meter, clonazepam, docusate sodium, fluticasone propionate, hydrocodone-acetaminophen, inhalation spacing device, ketoconazole, metformin, mirtazapine, montelukast, morphine, omeprazole, potassium chloride, promethazine, promethazine, stiolto respimat, atorvastatin, azithromycin, and methylprednisolone.  Current Outpatient Medications on File Prior to Visit   Medication Sig Dispense Refill    blood sugar diagnostic Strp 1 each.      blood-glucose meter Misc Use to check blood sugar      clonazePAM (KLONOPIN) 1 MG tablet Take 1 tablet (1 mg total) by mouth 2 (two) times daily. 60 tablet 3    docusate sodium (COLACE) 100 MG capsule Take 1 capsule (100 mg total) by mouth 2 (two) times daily. 60 capsule 0    HYDROcodone-acetaminophen (NORCO)  mg per tablet Take 1 tablet by mouth every 6 (six) hours as needed for Pain. Or worsening shortness of breath 120 tablet 0    inhalation device (VORTEX HOLDING CHAMBER) Use as directed for inhalation. For use with albuterol inhaler. 1 Device prn    ketoconazole (NIZORAL) 2 % cream APPLY TOPICALLY ONCE DAILY TO FEET 30 g 1    metFORMIN (GLUCOPHAGE-XR) 750 MG 24 hr tablet TAKE ONE TABLET BY MOUTH DAILY WITH DINNER  5    mirtazapine  (REMERON) 30 MG tablet Take 1 tablet (30 mg total) by mouth every evening. 30 tablet 11    morphine (MS CONTIN) 15 MG 12 hr tablet Take 1 tablet (15 mg total) by mouth every 12 (twelve) hours. 60 tablet 0    omeprazole (PRILOSEC) 20 MG capsule       potassium chloride (KLOR-CON) 10 MEQ TbSR Take 10 mEq by mouth 2 (two) times daily.  5    promethazine (PHENERGAN) 6.25 mg/5 mL syrup TAKE 5 MLS (6.25 MG TOTAL) BY MOUTH EVERY 6 (SIX) HOURS AS NEEDED FOR NAUSEA. 240 mL 6    promethazine (PHENERGAN) 6.25 mg/5 mL syrup Take 5 mLs (6.25 mg total) by mouth every 6 (six) hours as needed for Nausea. 240 mL 6    [DISCONTINUED] albuterol (PROVENTIL/VENTOLIN HFA) 90 mcg/actuation inhaler Inhale 2 puffs into the lungs every 4 (four) hours as needed. Rescue 18 g 0    [DISCONTINUED] albuterol-ipratropium (DUO-NEB) 2.5 mg-0.5 mg/3 mL nebulizer solution Use 3-4 times daily for 3 days following discharge. Then use every 6 hours as needed for SOB and wheeze. 1 Box 0    [DISCONTINUED] fluticasone propionate (FLOVENT HFA) 220 mcg/actuation inhaler Inhale 1 puff into the lungs 2 (two) times daily. Controller 12 g 12    [DISCONTINUED] montelukast (SINGULAIR) 10 mg tablet Take 1 tablet (10 mg total) by mouth every evening. 30 tablet 11    [DISCONTINUED] tiotropium-olodaterol (STIOLTO RESPIMAT) 2.5-2.5 mcg/actuation Mist Inhale 2 puffs into the lungs once daily. 4 g 11    atorvastatin (LIPITOR) 40 MG tablet Take 40 mg by mouth.      [DISCONTINUED] azithromycin (ZITHROMAX) 500 MG tablet Take 1 tablet (500 mg total) by mouth once daily. (Patient not taking: Reported on 9/24/2020) 3 tablet 2     No current facility-administered medications on file prior to visit.      She is allergic to nitrofurantoin monohyd/m-cryst and nsaids (non-steroidal anti-inflammatory drug)..    Review of Systems   Constitutional: Positive for malaise/fatigue.   HENT: Negative.    Respiratory: Positive for shortness of breath. Negative for cough and sputum  "production.             Genitourinary: Negative.    Musculoskeletal: Negative.    Skin: Negative.    Neurological: Negative.    Psychiatric/Behavioral: Negative.    All other systems reviewed and are negative.           Objective:      /70   Pulse 110   Resp 18   Ht 5' 3" (1.6 m)   Wt 76.4 kg (168 lb 6.9 oz)   SpO2 96%   BMI 29.84 kg/m²      Physical Exam  Vitals signs and nursing note reviewed.   Constitutional:       Appearance: She is well-developed.   HENT:      Head: Normocephalic and atraumatic.      Nose: Nose normal.      Mouth/Throat:      Pharynx: No oropharyngeal exudate.   Eyes:      General:         Left eye: No discharge.      Conjunctiva/sclera: Conjunctivae normal.      Pupils: Pupils are equal, round, and reactive to light.   Neck:      Musculoskeletal: Normal range of motion and neck supple.      Thyroid: No thyromegaly.      Vascular: No JVD.      Trachea: No tracheal deviation.   Cardiovascular:      Rate and Rhythm: Normal rate and regular rhythm.      Heart sounds: Normal heart sounds. No murmur.   Pulmonary:      Effort: Pulmonary effort is normal. No respiratory distress.      Breath sounds: No stridor. No wheezing.   Abdominal:      General: Bowel sounds are normal.      Palpations: Abdomen is soft.   Musculoskeletal: Normal range of motion.   Skin:     General: Skin is warm and dry.      Capillary Refill: Capillary refill takes 2 to 3 seconds.      Findings: No erythema.   Neurological:      Mental Status: She is alert and oriented to person, place, and time.      Cranial Nerves: No cranial nerve deficit.              Recent Labs     09/24/20  1439   PH 7.416   PCO2 42.3   PO2 87   HCO3 27.2   POCSATURATED 97   BE 3       6MW Test  Height: 5' 3" (160 cm)  Weight: 76.4 kg (168 lb 6.9 oz)  BMI (Calculated): 29.8  Predicted Distance: 353.91  Interpretation  Predicted Distance Meters (Calculated): 494.4 meters   Interpretation:  Total distance walked in six minutes is mildly reduced " "and therefore  the overall  functional capacity is mildly reduced. There was moderate exercise induced hypoxemia.     Oxygen desaturation did not meet criteria for supplemental oxygen prescription.     Clinical correlation suggested.     Arpita Knight M.D      Mild exercise-induced hypoxemia described as an arterial oxygen saturation of 93-95% (or 3-4% less than at rest), moderate exercise-induced hypoxemia as 89-93%, and severe exercise induced hypoxemia as < 89% O2 saturation.  Medicare Criteria Comments:   When arterial oxygen saturation is at or below 88% during exercise (severe exercise induced hypoxemia) then the patient falls under Medicare Group 1 criteria for supplemental oxygen.  Details about Medicare Group Criteria coverage can be found at http://www.cms.hhs.gov/manuals/downloads/       Assessment:     Problem List Items Addressed This Visit     Tobacco dependence (Chronic)     Again encouraged to stop smoking         Hypoxemia requiring supplemental oxygen     RA SpO2 was 96%    Recent Labs     09/24/20  1439   PH 7.416   PCO2 42.3   PO2 87   HCO3 27.2   POCSATURATED 97   BE 3      6MW Test  Height: 5' 3" (160 cm)  Weight: 76.4 kg (168 lb 6.9 oz)  BMI (Calculated): 29.8  Predicted Distance: 353.91  Interpretation  Predicted Distance Meters (Calculated): 494.4 meters   Total Distance Meters (Calculated): 350.52 meters   Predicted Distance Meters (Calculated): 497.61 meters   Percentage of Predicted (Calculated): 70.44  SpO2 mina was 90%    Await ONSAT  DME os [facility] [place] [facility]         Relevant Orders    PULSE OXIMETRY OVERNIGHT    Stage 3 severe COPD by GOLD classification - Primary     CAT score 32  mRC score 1- 2  Stiolto Respimat and FLOVENT, SINGULAR and albuterol  Chr Cough needing antitussives  FEV1 1.15 (47.4%)  Stable  Has been taking intermittent steriods  Risk of steriods disucssed     Group D: High risk More symptoms         Relevant Medications    fluticasone propionate (FLOVENT HFA) 220 mcg/actuation " inhaler    albuterol (PROVENTIL/VENTOLIN HFA) 90 mcg/actuation inhaler    azithromycin (ZITHROMAX) 500 MG tablet    montelukast (SINGULAIR) 10 mg tablet    tiotropium-olodateroL (STIOLTO RESPIMAT) 2.5-2.5 mcg/actuation Mist    albuterol-ipratropium (DUO-NEB) 2.5 mg-0.5 mg/3 mL nebulizer solution    methylPREDNISolone (MEDROL DOSEPACK) 4 mg tablet    Other Relevant Orders    PULSE OXIMETRY OVERNIGHT    Aortic atherosclerosis     STABLE         Chronic cough     Symptom management, Antitussives         DNR (do not resuscitate)     Follow up with palliative care         Acute on chronic respiratory failure with hypoxia     Needs to requalify with O2         Type 2 diabetes mellitus without complication, without long-term current use of insulin     STABLE              Plan:      Requested Prescriptions     Signed Prescriptions Disp Refills    fluticasone propionate (FLOVENT HFA) 220 mcg/actuation inhaler 12 g 12     Sig: Inhale 1 puff into the lungs 2 (two) times daily. Controller    albuterol (PROVENTIL/VENTOLIN HFA) 90 mcg/actuation inhaler 18 g 5     Sig: Inhale 2 puffs into the lungs every 4 (four) hours as needed. Rescue    azithromycin (ZITHROMAX) 500 MG tablet 3 tablet 3     Sig: Take 1 tablet (500 mg total) by mouth once daily.    montelukast (SINGULAIR) 10 mg tablet 30 tablet 11     Sig: Take 1 tablet (10 mg total) by mouth every evening.    tiotropium-olodateroL (STIOLTO RESPIMAT) 2.5-2.5 mcg/actuation Mist 4 g 11     Sig: Inhale 2 puffs into the lungs once daily.    albuterol-ipratropium (DUO-NEB) 2.5 mg-0.5 mg/3 mL nebulizer solution 1 Box 11     Sig: Use 3-4 times daily for 3 days following discharge. Then use every 6 hours as needed for SOB and wheeze.    methylPREDNISolone (MEDROL DOSEPACK) 4 mg tablet 1 Package 3     Sig: use as directed     Orders Placed This Encounter   Procedures    PULSE OXIMETRY OVERNIGHT     Standing Status:   Future     Number of Occurrences:   1     Standing Expiration  Date:   9/24/2021     Order Specific Question:   Reason for Test?     Answer:   O2 Re-Qualification     Order Specific Question:   Symptoms:     Answer:   Daytime Fatigue          Requested Prescriptions     Signed Prescriptions Disp Refills    fluticasone propionate (FLOVENT HFA) 220 mcg/actuation inhaler 12 g 12     Sig: Inhale 1 puff into the lungs 2 (two) times daily. Controller    albuterol (PROVENTIL/VENTOLIN HFA) 90 mcg/actuation inhaler 18 g 5     Sig: Inhale 2 puffs into the lungs every 4 (four) hours as needed. Rescue    azithromycin (ZITHROMAX) 500 MG tablet 3 tablet 3     Sig: Take 1 tablet (500 mg total) by mouth once daily.    montelukast (SINGULAIR) 10 mg tablet 30 tablet 11     Sig: Take 1 tablet (10 mg total) by mouth every evening.    tiotropium-olodateroL (STIOLTO RESPIMAT) 2.5-2.5 mcg/actuation Mist 4 g 11     Sig: Inhale 2 puffs into the lungs once daily.    albuterol-ipratropium (DUO-NEB) 2.5 mg-0.5 mg/3 mL nebulizer solution 1 Box 11     Sig: Use 3-4 times daily for 3 days following discharge. Then use every 6 hours as needed for SOB and wheeze.    methylPREDNISolone (MEDROL DOSEPACK) 4 mg tablet 1 Package 3     Sig: use as directed          Follow up in about 3 months (around 12/24/2020), or ONSAT, LDCT, refill meds.    This note was prepared using voice recognition system and is likely to have sound alike errors that may have been overlooked even after proof reading.  Please call me with any questions    Discussed diagnosis, its evaluation, treatment and usual course. All questions answered.    Thank you for the courtesy of participating in the care of this patient    David Toscano MD

## 2020-09-24 NOTE — ASSESSMENT & PLAN NOTE
"RA SpO2 was 96%    Recent Labs     09/24/20  1439   PH 7.416   PCO2 42.3   PO2 87   HCO3 27.2   POCSATURATED 97   BE 3      6MW Test  Height: 5' 3" (160 cm)  Weight: 76.4 kg (168 lb 6.9 oz)  BMI (Calculated): 29.8  Predicted Distance: 353.91  Interpretation  Predicted Distance Meters (Calculated): 494.4 meters   Total Distance Meters (Calculated): 350.52 meters   Predicted Distance Meters (Calculated): 497.61 meters   Percentage of Predicted (Calculated): 70.44  SpO2 mina was 90%    Await ONSAT  DME os [facility]  "

## 2020-09-24 NOTE — ASSESSMENT & PLAN NOTE
CAT score 32  mRC score 1- 2  Stiolto Respimat and FLOVENT, SINGULAR and albuterol  Chr Cough needing antitussives  FEV1 1.15 (47.4%)  Stable  Has been taking intermittent steriods  Risk of steriods disucssed     Group D: High risk More symptoms

## 2020-09-27 ENCOUNTER — PATIENT MESSAGE (OUTPATIENT)
Dept: SLEEP MEDICINE | Facility: CLINIC | Age: 60
End: 2020-09-27

## 2020-10-02 ENCOUNTER — TELEPHONE (OUTPATIENT)
Dept: PULMONOLOGY | Facility: CLINIC | Age: 60
End: 2020-10-02

## 2020-10-05 ENCOUNTER — PATIENT MESSAGE (OUTPATIENT)
Dept: SLEEP MEDICINE | Facility: CLINIC | Age: 60
End: 2020-10-05

## 2020-10-05 ENCOUNTER — PATIENT MESSAGE (OUTPATIENT)
Dept: PALLIATIVE MEDICINE | Facility: CLINIC | Age: 60
End: 2020-10-05

## 2020-10-05 ENCOUNTER — OFFICE VISIT (OUTPATIENT)
Dept: PALLIATIVE MEDICINE | Facility: CLINIC | Age: 60
End: 2020-10-05
Payer: MEDICAID

## 2020-10-05 DIAGNOSIS — R09.02 HYPOXEMIA REQUIRING SUPPLEMENTAL OXYGEN: ICD-10-CM

## 2020-10-05 DIAGNOSIS — G89.4 CHRONIC PAIN SYNDROME: ICD-10-CM

## 2020-10-05 DIAGNOSIS — Z99.81 HYPOXEMIA REQUIRING SUPPLEMENTAL OXYGEN: Primary | ICD-10-CM

## 2020-10-05 DIAGNOSIS — Z99.81 HYPOXEMIA REQUIRING SUPPLEMENTAL OXYGEN: ICD-10-CM

## 2020-10-05 DIAGNOSIS — R09.02 HYPOXEMIA REQUIRING SUPPLEMENTAL OXYGEN: Primary | ICD-10-CM

## 2020-10-05 DIAGNOSIS — J44.9 STAGE 3 SEVERE COPD BY GOLD CLASSIFICATION: ICD-10-CM

## 2020-10-05 DIAGNOSIS — R06.00 DYSPNEA, UNSPECIFIED TYPE: ICD-10-CM

## 2020-10-05 PROCEDURE — 99215 PR OFFICE/OUTPT VISIT, EST, LEVL V, 40-54 MIN: ICD-10-PCS | Mod: 95,,, | Performed by: FAMILY MEDICINE

## 2020-10-05 PROCEDURE — 99215 OFFICE O/P EST HI 40 MIN: CPT | Mod: 95,,, | Performed by: FAMILY MEDICINE

## 2020-10-05 RX ORDER — HYDROCODONE BITARTRATE AND ACETAMINOPHEN 10; 325 MG/1; MG/1
1 TABLET ORAL EVERY 6 HOURS PRN
Qty: 120 TABLET | Refills: 0 | Status: SHIPPED | OUTPATIENT
Start: 2020-10-05 | End: 2020-11-09 | Stop reason: SDUPTHER

## 2020-10-05 RX ORDER — MORPHINE SULFATE 15 MG/1
15 TABLET, FILM COATED, EXTENDED RELEASE ORAL EVERY 12 HOURS
Qty: 60 TABLET | Refills: 0 | Status: SHIPPED | OUTPATIENT
Start: 2020-10-05 | End: 2020-11-16 | Stop reason: SDUPTHER

## 2020-10-05 NOTE — PROGRESS NOTES
OVERNIGHT OXIMETRY REPORT:    Dictated by: David Toscano MD  Test date: 2020  Dictated on: 10/05/2020      Comment: This test was performed on Room Air     A desaturation event was defined as a decrease of saturation by 4 or more.    REPORT SUMMARY  Total valid samplin:19:00   High SpO2: 95%    Low SpO2: 72%    Mean SpO2  87.6 %  Cumulative time with oxygen saturation less than 88% (TC88): 14:07:08    CLINICAL INTERPRETATION   There is  significant nocturnal oxygen desaturation,  Clinical correlation is advised and  Recommend overnight polysomnography if clinically indicated    Medicare Criteria Comments:   Oximetry test results suggest the patient falls under Medicare Group 1 Criteria. ( Arterial oxygen saturation at or below 88% for at least 5 minutes taken during sleep)  David Toscano MD      Orders Placed This Encounter   Procedures    OXYGEN FOR HOME USE     [facility] D[facility] for CPAP/Oxygen/Nebulizer supplies.  Customer Service: [phone]  Call: [phone]  Fax: [phone]  Billing Inquiries: [phone] or [phone]     Order Specific Question:   Liter Flow     Answer:   2     Order Specific Question:   Duration     Answer:   With sleep     Order Specific Question:   Qualifying SpO2:     Answer:   72     Order Specific Question:   Testing done at:     Answer:   Sleep     Order Specific Question:   Route     Answer:   nasal cannula     Order Specific Question:   Portable mode:     Answer:   continuous     Order Specific Question:   Device     Answer:   home concentrator     Order Specific Question:   Length of need (in months):     Answer:   99 mos     Order Specific Question:   Patient condition with qualifying saturation     Answer:   COPD     Order Specific Question:   Height:     Answer:   5 3     Order Specific Question:   Weight:     Answer:   168 lb     Order Specific Question:   Alternative treatment measures have been tried or considered and deemed clinically  ineffective.     Answer:   Yes

## 2020-10-05 NOTE — PROGRESS NOTES
Subjective:       Patient ID: Sarah Monahan is a 60 y.o. female.    Chief Complaint: f/u pain and dyspnea  61 yo female with advanced COPD, ongoing tobacco abuse, and h/o chronic pain seen for palliative f/u. She reports that she has been smoking more than usual lately and would like to follow up with smoking cessation counseling at [facility]. She has recently seen her pulmonologist who notes increased frequency of need for steroid therapy. She has completing recertification testing for her home oxygen. She reports that her medication has been working well to control her pain, anxiety, and dyspnea. She thinks her increased smoking has to do with boredom as she is home much more due to COVID19 event currently ongoing. No new symptoms to report.  reviewed with no patterns of abuse identified.     does not have any pertinent problems on file.  Past Medical History:   Diagnosis Date    Acid reflux     Anemia     Asthma     Bronchitis chronic     Diabetes mellitus     pt states she's not a diabetic    DVT, lower extremity     RLE    Emphysema of lung     Hepatitis B     Hepatitis C     Herniated disc     Hyperlipidemia     Kidney stone     Lung disease     Oxygen dependent     nightly    Supraventricular tachycardia     Urinary tract infection      Past Surgical History:   Procedure Laterality Date    abdominal laparotomy      BREAST SURGERY Right     lumpectomy x 2 benign    CERVICAL FUSION      C4 & C5     SECTION, CLASSIC      CHOLECYSTECTOMY      CYSTOSCOPY W/ LASER LITHOTRIPSY      HERNIA REPAIR      KNEE ARTHROSCOPY W/ ACL RECONSTRUCTION      L knee    orif knee Right     SALPINGECTOMY Right      Family History   Problem Relation Age of Onset    Cancer Mother     Diabetes Father     Hypertension Father     Heart disease Father     Heart attack Father 60        AMI- CABG    Cancer Maternal Grandmother     Diabetes Paternal Grandmother     Heart failure Paternal  Grandmother      Social History     Socioeconomic History    Marital status:      Spouse name: Not on file    Number of children: Not on file    Years of education: Not on file    Highest education level: Not on file   Occupational History    Not on file   Social Needs    Financial resource strain: Not on file    Food insecurity     Worry: Not on file     Inability: Not on file    Transportation needs     Medical: Not on file     Non-medical: Not on file   Tobacco Use    Smoking status: Current Every Day Smoker     Packs/day: 1.00     Years: 40.00     Pack years: 40.00     Last attempt to quit: 2015     Years since quittin.3    Smokeless tobacco: Never Used    Tobacco comment: no smoking after m.n prior to sx   Substance and Sexual Activity    Alcohol use: No     Alcohol/week: 0.0 standard drinks    Drug use: No    Sexual activity: Yes     Partners: Male   Lifestyle    Physical activity     Days per week: Not on file     Minutes per session: Not on file    Stress: Not on file   Relationships    Social connections     Talks on phone: Not on file     Gets together: Not on file     Attends [employer] service: Not on file     Active member of club or organization: Not on file     Attends meetings of clubs or organizations: Not on file     Relationship status: Not on file   Other Topics Concern    Not on file   Social History Narrative    Not on file     Review of Systems   A comprehensive 14-point review of systems was reviewed with patient and was negative other than as specified above.     Objective:   There were no vitals filed for this visit.     Physical Exam  Constitutional:       General: She is not in acute distress.     Appearance: She is well-developed. She is obese. She is ill-appearing.   HENT:      Head: Normocephalic and atraumatic.   Eyes:      General: No scleral icterus.  Neck:      Musculoskeletal: Normal range of motion and neck supple.   Pulmonary:      Effort:  Pulmonary effort is normal. No respiratory distress.      Comments: Course cough  Musculoskeletal: Normal range of motion.   Skin:     Findings: No rash.   Neurological:      Mental Status: She is alert and oriented to person, place, and time.   Psychiatric:         Behavior: Behavior normal.         Thought Content: Thought content normal.         Review of Symptoms    Symptom Assessment (ESAS 0-10 Scale)  Pain:  4  Dyspnea:  6  Anxiety:  5  Nausea:  0  Depression:  3  Anorexia:  0  Fatigue:  7  Insomnia:  2  Restlessness:  0  Agitation:  0     CAM / Delirium:  Negative  Constipation:  Negative  Diarrhea:  Negative    Bowel Management Plan (BMP):  Yes            OME in 24 hours:  40-60    ECOG Performance Status Grade:  1 - Ambulates, capable of light work    Living Arrangements:  Lives with spouse    Advance Care Planning   Advance Directives:   [place]: Yes           Assessment:       1. Dyspnea, unspecified type    2. Stage 3 severe COPD by GOLD classification    3. Chronic pain syndrome    4. Hypoxemia requiring supplemental oxygen        Plan:           Problem List Items Addressed This Visit        Neuro    Chronic pain    Relevant Medications    HYDROcodone-acetaminophen (NORCO)  mg per tablet       Pulmonary    Hypoxemia requiring supplemental oxygen    Overview     Oxygen with sleep. Benefits from use.         Relevant Medications    HYDROcodone-acetaminophen (NORCO)  mg per tablet    Stage 3 severe COPD by GOLD classification    Overview     Stiolto, flovent Duoneb, Albuterol, oxygen         Relevant Medications    morphine (MS CONTIN) 15 MG 12 hr tablet    HYDROcodone-acetaminophen (NORCO)  mg per tablet       Other    Dyspnea    Relevant Medications    morphine (MS CONTIN) 15 MG 12 hr tablet        Will provide her with number to smoking cessation counselor. RTC 3 months or sooner if symptoms worsen.     The patient location is: [place]  The chief complaint leading to consultation is:  pain/dyspnea    Visit type: audiovisual    Face to Face time with patient: 30  45 minutes of total time spent on the encounter, which includes face to face time and non-face to face time preparing to see the patient (eg, review of tests), Obtaining and/or reviewing separately obtained history, Documenting clinical information in the electronic or other health record, Independently interpreting results (not separately reported) and communicating results to the patient/family/caregiver, or Care coordination (not separately reported).         Each patient to whom he or she provides medical services by telemedicine is:  (1) informed of the relationship between the physician and patient and the respective role of any other health care provider with respect to management of the patient; and (2) notified that he or she may decline to receive medical services by telemedicine and may withdraw from such care at any time.

## 2020-10-05 NOTE — PROCEDURES
[facility]  [address] * [place] [address]  Telephone: [phone]  Test date: 20 Start: 20 21:19:26 Sarah Monahan  Doctor: Dr. Toscano End: 20 07:40:18 [id number]  Oximetry: Summary Report  Comments: Room Air  Recording time: 34:20:52 Highest pulse: 122 Highest SpO2: 95%  Excluded samplin:01:52 Lowest pulse: 91 Lowest SpO2: 72%  Total valid samplin:19:00 Mean pulse: 101 Mean SpO2: 87.6%  1 S.D.: 4.8 1 S.D.: 3.2  Time with SpO2<90: 16:07:36, 72.3%  Time with SpO2<80: 0:45:32, 3.4%  Time with SpO2<70: 0:00:00, 0.0%  Time with SpO2<60: 0:00:00, 0.0%  Time with SpO2<89: 14:07:08, 63.3%  Time with SpO2 =>90: 6:11:24, 27.7%  Time with SpO2=>80 & <90: 15:22:04, 68.9%  Time with SpO2=>70 & <80: 0:45:32, 3.4%  Time with SpO2=>60 & <70: 0:00:00, 0.0%  The longest continuous time with saturation <=88 was 01:51:24, which started at  20 02:39:58.  A desaturation event was defined as a decrease of saturation by 4 or more.  No events were excluded due to artifact.  There were 26 desaturation events over 3 minutes duration.  There were 59 desaturation events of less than 3 minutes duration during which:  The mean high was 86.9%. The mean low was 80.5%.  The number of these events that were:  > 0 & <10 seconds: 2 > 0 seconds: 59  =>10 & <20 seconds: 8 =>10 seconds: 57  =>20 & <30 seconds: 10 =>20 seconds: 49  =>30 & <40 seconds: 4 =>30 seconds: 39  =>40 & <50 seconds: 2 =>40 seconds: 35  =>50 & <60 seconds: 4 =>50 seconds: 33  =>60 seconds: 29 =>60 seconds: 29  The mean length of desaturation events that were >=10 sec & <=3 mins was: 64.7 sec.  Desaturation event index (events >=10 sec per sampled hour): 2.6  Desaturation event index (events >= 0 sec per sampled hour): 2.6          OVERNIGHT OXIMETRY REPORT:    Dictated by: David Toscano MD  Test date: 2020  Dictated on: 10/05/2020      Comment: This test was performed on Room Air     A desaturation  event was defined as a decrease of saturation by 4 or more.    REPORT SUMMARY  Total valid samplin:19:00   High SpO2: 95%    Low SpO2: 72%    Mean SpO2  87.6 %  Cumulative time with oxygen saturation less than 88% (TC88): 14:07:08    CLINICAL INTERPRETATION   There is  significant nocturnal oxygen desaturation,  Clinical correlation is advised and  Recommend overnight polysomnography if clinically indicated    Medicare Criteria Comments:   Oximetry test results suggest the patient falls under Medicare Group 1 Criteria. ( Arterial oxygen saturation at or below 88% for at least 5 minutes taken during sleep)  David Toscano MD    Details about Medicare Group Criteria coverage can be found at http://www.cms.hhs.gov/manuals/downloads/

## 2020-10-05 NOTE — PATIENT INSTRUCTIONS
Pain medications are useful tools in treating your pain, but there are side effects and risks to be aware of. Constipation is a frequent side effect. Be sure to have a normal bowel movement at least every 2-3 days. You can take Miralax up to 3 times daily, Senna tablets, or use glycerin suppositories to help. If none of this is effective, please let me know.     Pain medication can increase sedation or sleepiness and should not be mixed with alcohol or other medication that can cause drowsiness. Because of this, pain medication can also increase the risk of falls. Please exercise caution while on these medications.     How to Quit Smoking  Smoking is one of the hardest habits to break. About half of all people who have ever smoked have been able to quit. Most people who still smoke want to quit. Here are some of the best ways to stop smoking.    Keep trying  Most smokers make many attempts at quitting before they are successful. Its important not to give up.  Go cold turkey  Most former smokers quit cold turkey (all at once). Trying to cut back gradually doesn't seem to work as well, perhaps because it continues the smoking habit. Also, it is possible to inhale more while smoking fewer cigarettes. This results in the same amount of nicotine in your body.  Get support  Support programs can be a big help, especially for heavy smokers. These groups offer lectures, ways to change behavior, and peer support. Here are some ways to find a support program:  · Free national quitline: 800-QUIT-NOW (093-446-3545).  · Hospital quit-smoking programs.  · American Lung Association: (277.845.3349).  · American Cancer Society (275-713-4477).  Support at home is important too. Nonsmokers can offer praise and encouragement. If the smoker in your life finds it hard to quit, encourage them to keep trying.  Over-the-counter medicines  Nicotine replacement therapy may make quitting easier. Certain aids, such as the nicotine patch, gum,  "and lozenges, are available without a prescription. It is best to use these under a doctors care, though. The skin patch provides a steady supply of nicotine. Nicotine gum and lozenges give temporary bursts of low levels of nicotine. Both methods reduce the craving for cigarettes. Warning: If you have nausea, vomiting, dizziness, weakness, or a fast heartbeat, stop using these products and see your doctor.  Prescription medicines  After reviewing your smoking patterns and past attempts to quit, your doctor may offer a prescription medicine such as bupropion, varenicline, a nicotine inhaler, or nasal spray. Each has advantages and side effects. Your doctor can review these with you.  Health benefits of quitting  The benefits of quitting start right away and keep improving the longer you go without smoking. These benefits occur at any age.  So whether you are 17 or 70, quitting is a good decision. Some of the benefits include:  · 20 minutes: Blood pressure and pulse return to normal.  · 8 hours: Oxygen levels return to normal.  · 2 days: Ability to smell and taste begin to improve as damaged nerves regrow.  · 2 to 3 weeks: Circulation and lung function improve.  · 1 to 9 months: Coughing, congestion, and shortness of breath decrease; tiredness decreases.  · 1 year: Risk of heart attack decreases by half.  · 5 years: Risk of lung cancer decreases by half; risk of stroke becomes the same as a nonsmokers.  For more on how to quit smoking, try these online resources:   · Smokefree.gov  · "Clearing the Air" booklet from the National Cancer [employer]: smokefree.gov/sites/default/files/pdf/clearing-the-air-accessible.pdf  Date Last Reviewed: 3/1/2017  © 6011-1946 [employer]. [address] [place] [address]. All rights reserved. This information is not intended as a substitute for professional medical care. Always follow your healthcare professional's instructions.          "

## 2020-10-05 NOTE — PROGRESS NOTES
Palliative Care:    3 month follow up scheduled and sent Smoking Cessation Program number to patient via patient portal per Dr. Salguero's request.    Patricia

## 2020-10-07 ENCOUNTER — PATIENT MESSAGE (OUTPATIENT)
Dept: SMOKING CESSATION | Facility: CLINIC | Age: 60
End: 2020-10-07

## 2020-10-07 ENCOUNTER — PATIENT MESSAGE (OUTPATIENT)
Dept: SLEEP MEDICINE | Facility: CLINIC | Age: 60
End: 2020-10-07

## 2020-10-08 ENCOUNTER — PATIENT MESSAGE (OUTPATIENT)
Dept: SLEEP MEDICINE | Facility: CLINIC | Age: 60
End: 2020-10-08

## 2020-10-08 ENCOUNTER — CLINICAL SUPPORT (OUTPATIENT)
Dept: SMOKING CESSATION | Facility: CLINIC | Age: 60
End: 2020-10-08
Payer: COMMERCIAL

## 2020-10-08 ENCOUNTER — PATIENT MESSAGE (OUTPATIENT)
Dept: SMOKING CESSATION | Facility: CLINIC | Age: 60
End: 2020-10-08

## 2020-10-08 DIAGNOSIS — F17.200 NICOTINE DEPENDENCE: Primary | ICD-10-CM

## 2020-10-08 PROCEDURE — 99404 PREV MED CNSL INDIV APPRX 60: CPT | Mod: S$GLB,,, | Performed by: GENERAL PRACTICE

## 2020-10-08 PROCEDURE — 99404 PR PREVENT COUNSEL,INDIV,60 MIN: ICD-10-PCS | Mod: S$GLB,,, | Performed by: GENERAL PRACTICE

## 2020-10-08 PROCEDURE — 99999 PR PBB SHADOW E&M-EST. PATIENT-LVL I: ICD-10-PCS | Mod: PBBFAC,,,

## 2020-10-08 PROCEDURE — 99999 PR PBB SHADOW E&M-EST. PATIENT-LVL I: CPT | Mod: PBBFAC,,,

## 2020-10-08 RX ORDER — IBUPROFEN 200 MG
1 TABLET ORAL DAILY
Qty: 14 PATCH | Refills: 0 | Status: SHIPPED | OUTPATIENT
Start: 2020-10-08 | End: 2020-11-10 | Stop reason: SDUPTHER

## 2020-10-08 RX ORDER — VARENICLINE TARTRATE 0.5 (11)-1
KIT ORAL
Qty: 53 TABLET | Refills: 0 | Status: SHIPPED | OUTPATIENT
Start: 2020-10-08 | End: 2020-11-10 | Stop reason: ALTCHOICE

## 2020-10-12 ENCOUNTER — PATIENT MESSAGE (OUTPATIENT)
Dept: SMOKING CESSATION | Facility: CLINIC | Age: 60
End: 2020-10-12

## 2020-10-12 ENCOUNTER — PATIENT MESSAGE (OUTPATIENT)
Dept: SLEEP MEDICINE | Facility: CLINIC | Age: 60
End: 2020-10-12

## 2020-10-16 ENCOUNTER — PATIENT MESSAGE (OUTPATIENT)
Dept: SMOKING CESSATION | Facility: CLINIC | Age: 60
End: 2020-10-16

## 2020-10-16 ENCOUNTER — PATIENT MESSAGE (OUTPATIENT)
Dept: SLEEP MEDICINE | Facility: CLINIC | Age: 60
End: 2020-10-16

## 2020-10-22 ENCOUNTER — CLINICAL SUPPORT (OUTPATIENT)
Dept: SMOKING CESSATION | Facility: CLINIC | Age: 60
End: 2020-10-22
Payer: COMMERCIAL

## 2020-10-22 DIAGNOSIS — F17.200 NICOTINE DEPENDENCE: Primary | ICD-10-CM

## 2020-10-22 PROCEDURE — 99402 PR PREVENT COUNSEL,INDIV,30 MIN: ICD-10-PCS | Mod: S$GLB,,, | Performed by: GENERAL PRACTICE

## 2020-10-22 PROCEDURE — 99402 PREV MED CNSL INDIV APPRX 30: CPT | Mod: S$GLB,,, | Performed by: GENERAL PRACTICE

## 2020-10-22 NOTE — PROGRESS NOTES
Individual Follow-Up Form    10/22/2020      Clinical Status of Patient: Outpatient    Length of Service: 30 minutes    Continuing Medication: yes  Chantix    Other Medications: patches daily     Target Symptoms: Withdrawal and medication side effects. The following were  rated moderate (3) to severe (4) on TCRS:  · Moderate (3): nausea, desire tobacco, headache  · Severe (4): none    Comments: Spoke with patient by telephone in regards to her smoking cessation progress. The patient remains on the prescribed tobacco cessation medication regimen of 1 mg Chantix BID and 21 mg Nicoderm CQ daily without any negative side effects at this time. She stated that she has been feeling nauseated in the morning with a headache in the afternoon. Discussed eating and drinking water prior to taking her first dose. She states that she has been reducing her smoking each day and feels that she is making progress. Discussed an aggressive tapering plan and a 24 hour quit trial. Discussed coping strategies. The patient denies any abnormal behavioral or mental changes at this time. Will continue to encourage and monitor progress.    Diagnosis: F17.200    Next Visit: 1 week

## 2020-11-09 ENCOUNTER — PATIENT MESSAGE (OUTPATIENT)
Dept: PALLIATIVE MEDICINE | Facility: CLINIC | Age: 60
End: 2020-11-09

## 2020-11-09 ENCOUNTER — CLINICAL SUPPORT (OUTPATIENT)
Dept: SMOKING CESSATION | Facility: CLINIC | Age: 60
End: 2020-11-09
Payer: COMMERCIAL

## 2020-11-09 DIAGNOSIS — F17.200 NICOTINE DEPENDENCE: Primary | ICD-10-CM

## 2020-11-09 DIAGNOSIS — R09.02 HYPOXEMIA REQUIRING SUPPLEMENTAL OXYGEN: ICD-10-CM

## 2020-11-09 DIAGNOSIS — J44.9 STAGE 3 SEVERE COPD BY GOLD CLASSIFICATION: ICD-10-CM

## 2020-11-09 DIAGNOSIS — G89.4 CHRONIC PAIN SYNDROME: ICD-10-CM

## 2020-11-09 DIAGNOSIS — Z99.81 HYPOXEMIA REQUIRING SUPPLEMENTAL OXYGEN: ICD-10-CM

## 2020-11-09 PROCEDURE — 99407 PR TOBACCO USE CESSATION INTENSIVE >10 MINUTES: ICD-10-PCS | Mod: S$GLB,,, | Performed by: GENERAL PRACTICE

## 2020-11-09 PROCEDURE — 99407 BEHAV CHNG SMOKING > 10 MIN: CPT | Mod: S$GLB,,, | Performed by: GENERAL PRACTICE

## 2020-11-10 ENCOUNTER — PATIENT MESSAGE (OUTPATIENT)
Dept: SMOKING CESSATION | Facility: CLINIC | Age: 60
End: 2020-11-10

## 2020-11-10 DIAGNOSIS — F17.200 NICOTINE DEPENDENCE: ICD-10-CM

## 2020-11-10 RX ORDER — VARENICLINE TARTRATE 1 MG/1
1 TABLET, FILM COATED ORAL 2 TIMES DAILY
Qty: 56 TABLET | Refills: 1 | Status: SHIPPED | OUTPATIENT
Start: 2020-11-10 | End: 2020-12-17 | Stop reason: SDUPTHER

## 2020-11-10 RX ORDER — IBUPROFEN 200 MG
1 TABLET ORAL DAILY
Qty: 14 PATCH | Refills: 0 | Status: SHIPPED | OUTPATIENT
Start: 2020-11-10 | End: 2020-12-17 | Stop reason: SDUPTHER

## 2020-11-10 RX ORDER — HYDROCODONE BITARTRATE AND ACETAMINOPHEN 10; 325 MG/1; MG/1
1 TABLET ORAL EVERY 6 HOURS PRN
Qty: 120 TABLET | Refills: 0 | Status: SHIPPED | OUTPATIENT
Start: 2020-11-10 | End: 2020-12-07 | Stop reason: SDUPTHER

## 2020-11-11 ENCOUNTER — PATIENT MESSAGE (OUTPATIENT)
Dept: SMOKING CESSATION | Facility: CLINIC | Age: 60
End: 2020-11-11

## 2020-11-16 ENCOUNTER — PATIENT MESSAGE (OUTPATIENT)
Dept: PALLIATIVE MEDICINE | Facility: CLINIC | Age: 60
End: 2020-11-16

## 2020-11-18 RX ORDER — PROMETHAZINE HYDROCHLORIDE 6.25 MG/5ML
6.25 SYRUP ORAL EVERY 6 HOURS PRN
Qty: 240 ML | Refills: 6 | Status: SHIPPED | OUTPATIENT
Start: 2020-11-18 | End: 2021-02-10

## 2020-11-19 ENCOUNTER — CLINICAL SUPPORT (OUTPATIENT)
Dept: SMOKING CESSATION | Facility: CLINIC | Age: 60
End: 2020-11-19
Payer: COMMERCIAL

## 2020-11-19 DIAGNOSIS — F17.200 NICOTINE DEPENDENCE: Primary | ICD-10-CM

## 2020-11-19 PROCEDURE — 99999 PR PBB SHADOW E&M-EST. PATIENT-LVL III: CPT | Mod: PBBFAC,,,

## 2020-11-19 PROCEDURE — 99999 PR PBB SHADOW E&M-EST. PATIENT-LVL III: ICD-10-PCS | Mod: PBBFAC,,,

## 2020-11-19 PROCEDURE — 99402 PREV MED CNSL INDIV APPRX 30: CPT | Mod: S$GLB,,, | Performed by: GENERAL PRACTICE

## 2020-11-19 PROCEDURE — 99402 PR PREVENT COUNSEL,INDIV,30 MIN: ICD-10-PCS | Mod: S$GLB,,, | Performed by: GENERAL PRACTICE

## 2020-11-19 NOTE — PROGRESS NOTES
Individual Follow-Up Form         11/19/2020         Clinical Status of Patient: Outpatient         Length of Service: 30 mins         Continuing Medication: Yes, Chanix         Other Medications: Nicoderm CQ patches             Target Symptoms: Withdrawal and medication side effects. The following were     rated moderate (3) to severe (4) on TCRS:  Moderate (3): slight mood swings  Severe (4): n/a         Comments: Patient stated that she is compliant with taking her Chantix 1 mg prescription and administering her Nicoderm CQ patches 21 mg.  She expressed that she is experiencing some slight mood swings from the Chantix but its not severe.  Patient has reported that she has decreased her smoking from 3 packs a day to 1 pack a day.  When she smokes her cigarettes, she expressed that she can only tolerate one puff due to the poor taste.  Counselor explained that the stale taste is the effect of the Chantix and its important for her to listen to her body's response to it.  Counselor also expressed that her lingering desire to smoke is probably due to the habitual behavior and utilizing cinnamon tooth picks might be beneficial.  Counselor offered to provide the toothpicks for her to  at the clinic at her convince.  Patient appeared to be receptive to the information given.   Counselor will continue to encourage patient to be tobacco free.         Diagnosis: Nicotine Dependence         Next Visit: 1 week

## 2020-12-03 NOTE — ASSESSMENT & PLAN NOTE
Assistance with smoking cessation was offered, including:  []  Medications  [x]  Counseling  []  Printed Information on Smoking Cessation  []  Referral to a Smoking Cessation Program    Patient was counseled regarding smoking for 3-10 minutes.       From: Franchesca Casey  To: Shahid Botello  Sent: 12/2/2020 10:25 PM CST  Subject: Lab Test or Test Related Question    I would like to have a test ordered to see if i have the anti bodies for COVID 19. My daughter had covid a few weeks ago and i beleive i did too, but was never tested. For peace of mind, i would like to know.    Are you able to order that and were would i go to get that?  Thanks

## 2020-12-07 ENCOUNTER — PATIENT MESSAGE (OUTPATIENT)
Dept: PALLIATIVE MEDICINE | Facility: CLINIC | Age: 60
End: 2020-12-07

## 2020-12-17 ENCOUNTER — CLINICAL SUPPORT (OUTPATIENT)
Dept: SMOKING CESSATION | Facility: CLINIC | Age: 60
End: 2020-12-17
Payer: COMMERCIAL

## 2020-12-17 ENCOUNTER — PATIENT MESSAGE (OUTPATIENT)
Dept: SMOKING CESSATION | Facility: CLINIC | Age: 60
End: 2020-12-17

## 2020-12-17 ENCOUNTER — TELEPHONE (OUTPATIENT)
Dept: SMOKING CESSATION | Facility: CLINIC | Age: 60
End: 2020-12-17

## 2020-12-17 DIAGNOSIS — F17.200 NICOTINE DEPENDENCE: ICD-10-CM

## 2020-12-17 DIAGNOSIS — F17.200 NICOTINE DEPENDENCE: Primary | ICD-10-CM

## 2020-12-17 PROCEDURE — 99999 PR PBB SHADOW E&M-EST. PATIENT-LVL I: ICD-10-PCS | Mod: PBBFAC,,,

## 2020-12-17 PROCEDURE — 99403 PR PREVENT COUNSEL,INDIV,45 MIN: ICD-10-PCS | Mod: S$GLB,,, | Performed by: GENERAL PRACTICE

## 2020-12-17 PROCEDURE — 99999 PR PBB SHADOW E&M-EST. PATIENT-LVL I: CPT | Mod: PBBFAC,,,

## 2020-12-17 PROCEDURE — 99403 PREV MED CNSL INDIV APPRX 45: CPT | Mod: S$GLB,,, | Performed by: GENERAL PRACTICE

## 2020-12-17 RX ORDER — VARENICLINE TARTRATE 1 MG/1
1 TABLET, FILM COATED ORAL 2 TIMES DAILY
Qty: 56 TABLET | Refills: 1 | Status: SHIPPED | OUTPATIENT
Start: 2020-12-17 | End: 2021-10-15

## 2020-12-17 RX ORDER — IBUPROFEN 200 MG
1 TABLET ORAL DAILY
Qty: 14 PATCH | Refills: 0 | Status: SHIPPED | OUTPATIENT
Start: 2020-12-17 | End: 2021-05-04

## 2020-12-17 NOTE — PROGRESS NOTES
Individual Follow-Up Form    12/17/2020    Clinical Status of Patient: Outpatient    Length of Service: 45 minutes    Continuing Medication: yes  Chantix    Other Medications: nicotine patches     Target Symptoms: Withdrawal and medication side effects. The following were  rated moderate (3) to severe (4) on TCRS:  · Moderate (3):shortness of breath, difficulty concentrating, restless, anxious, tired, nausea, desire tobacco  · Severe (4): none    Comments: Spoke with patient by telephone twice at length in regards to her smoking cessation progress and medication refills. She states that she is smoking 1/2 pack per day while wearing a nicotine patch and talking Chant ix. The patient remains on the prescribed tobacco cessation medication regimen of 1 mg Chant ix BID without any negative side effects at this time. She states that he gets easily agitated when she is out of cigarettes or when she is getting close to running out of cigarettes. She inquired about adding additional cessation medication to her treatment plan. Discussed why she feels that she continues to smoke. She states that she smokes because she gets bored or depressed. She smokes because her  buys her cigarettes. She states that she truly wants to quit smoking. Discussed coping strategies and behavior modifications to make her quit attempt a reality. Discussed how nicotine affects her entire body and decreases the effectiveness of her other medications. She verbalized understanding. Encouraged a 24 hour quit attempt. Discussed expiration of Trust benefits and renewal. Will continue to encourage and monitor progress.    Diagnosis: F17.200    Next Visit: 2 weeks

## 2020-12-21 ENCOUNTER — TELEPHONE (OUTPATIENT)
Dept: PALLIATIVE MEDICINE | Facility: CLINIC | Age: 60
End: 2020-12-21

## 2020-12-21 ENCOUNTER — OFFICE VISIT (OUTPATIENT)
Dept: PALLIATIVE MEDICINE | Facility: CLINIC | Age: 60
End: 2020-12-21
Payer: MEDICAID

## 2020-12-21 ENCOUNTER — PATIENT MESSAGE (OUTPATIENT)
Dept: PALLIATIVE MEDICINE | Facility: CLINIC | Age: 60
End: 2020-12-21

## 2020-12-21 DIAGNOSIS — R06.00 DYSPNEA, UNSPECIFIED TYPE: ICD-10-CM

## 2020-12-21 DIAGNOSIS — G89.4 CHRONIC PAIN SYNDROME: ICD-10-CM

## 2020-12-21 DIAGNOSIS — R09.02 HYPOXEMIA REQUIRING SUPPLEMENTAL OXYGEN: ICD-10-CM

## 2020-12-21 DIAGNOSIS — J44.9 STAGE 3 SEVERE COPD BY GOLD CLASSIFICATION: ICD-10-CM

## 2020-12-21 DIAGNOSIS — Z99.81 HYPOXEMIA REQUIRING SUPPLEMENTAL OXYGEN: ICD-10-CM

## 2020-12-21 DIAGNOSIS — Z66 DNR (DO NOT RESUSCITATE): Primary | ICD-10-CM

## 2020-12-21 PROCEDURE — 99215 OFFICE O/P EST HI 40 MIN: CPT | Mod: 95,,, | Performed by: FAMILY MEDICINE

## 2020-12-21 PROCEDURE — 99215 PR OFFICE/OUTPT VISIT, EST, LEVL V, 40-54 MIN: ICD-10-PCS | Mod: 95,,, | Performed by: FAMILY MEDICINE

## 2020-12-21 RX ORDER — MORPHINE SULFATE 15 MG/1
15 TABLET, FILM COATED, EXTENDED RELEASE ORAL EVERY 12 HOURS
Qty: 60 TABLET | Refills: 0 | Status: SHIPPED | OUTPATIENT
Start: 2021-01-18 | End: 2021-01-06 | Stop reason: SDUPTHER

## 2020-12-21 RX ORDER — HYDROCODONE BITARTRATE AND ACETAMINOPHEN 10; 325 MG/1; MG/1
1 TABLET ORAL EVERY 6 HOURS PRN
Qty: 120 TABLET | Refills: 0 | Status: SHIPPED | OUTPATIENT
Start: 2021-01-10 | End: 2021-01-06 | Stop reason: SDUPTHER

## 2020-12-21 NOTE — PROGRESS NOTES
Subjective:       Patient ID: Sarah Monahan is a 60 y.o. female.    Chief Complaint: f/u    The patient location is: [place]  The chief complaint leading to consultation is: f/u    Visit type: audiovisual    Face to Face time with patient: 30  45 minutes of total time spent on the encounter, which includes face to face time and non-face to face time preparing to see the patient (eg, review of tests), Obtaining and/or reviewing separately obtained history, Documenting clinical information in the electronic or other health record, Independently interpreting results (not separately reported) and communicating results to the patient/family/caregiver, or Care coordination (not separately reported).         Each patient to whom he or she provides medical services by telemedicine is:  (1) informed of the relationship between the physician and patient and the respective role of any other health care provider with respect to management of the patient; and (2) notified that he or she may decline to receive medical services by telemedicine and may withdraw from such care at any time.    Notes: 61 yo female with advanced COPD, DM2, and debility seen for palliative follow-up. She reports several episodes of COPD exacerbation requiring antibiotic use and steroids with relief of symptoms, managed by her PCP and pulmonology. She is hoping to get home oxygen set up with upcoming appointment with pulmonology next month. Continues to manage chronic pain and anxiety with stable dose of current medications.  reviewed with no disparities noted.     does not have any pertinent problems on file.  Past Medical History:   Diagnosis Date    Acid reflux     Anemia     Asthma     Bronchitis chronic     Diabetes mellitus     pt states she's not a diabetic    DVT, lower extremity     RLE    Emphysema of lung     Hepatitis B     Hepatitis C     Herniated disc     Hyperlipidemia     Kidney stone     Lung disease     Oxygen  dependent     nightly    Supraventricular tachycardia     Urinary tract infection      Past Surgical History:   Procedure Laterality Date    abdominal laparotomy      BREAST SURGERY Right     lumpectomy x 2 benign    CERVICAL FUSION      C4 & C5     SECTION, CLASSIC      CHOLECYSTECTOMY      CYSTOSCOPY W/ LASER LITHOTRIPSY      HERNIA REPAIR      KNEE ARTHROSCOPY W/ ACL RECONSTRUCTION      L knee    orif knee Right     SALPINGECTOMY Right      Family History   Problem Relation Age of Onset    Cancer Mother     Diabetes Father     Hypertension Father     Heart disease Father     Heart attack Father 60        [facility]- CABG    Cancer Maternal Grandmother     Diabetes Paternal Grandmother     Heart failure Paternal Grandmother      Social History     Socioeconomic History    Marital status:      Spouse name: Not on file    Number of children: Not on file    Years of education: Not on file    Highest education level: Not on file   Occupational History    Not on file   Social Needs    Financial resource strain: Not on file    Food insecurity     Worry: Not on file     Inability: Not on file    Transportation needs     Medical: Not on file     Non-medical: Not on file   Tobacco Use    Smoking status: Current Every Day Smoker     Packs/day: 3.00     Years: 40.00     Pack years: 120.00    Smokeless tobacco: Never Used    Tobacco comment: no smoking after m.n prior to sx   Substance and Sexual Activity    Alcohol use: No     Alcohol/week: 0.0 standard drinks    Drug use: No    Sexual activity: Yes     Partners: Male   Lifestyle    Physical activity     Days per week: Not on file     Minutes per session: Not on file    Stress: Not on file   Relationships    Social connections     Talks on phone: Not on file     Gets together: Not on file     Attends Shinto service: Not on file     Active member of club or organization: Not on file     Attends meetings of clubs or  organizations: Not on file     Relationship status: Not on file   Other Topics Concern    Not on file   Social History Narrative    Not on file     Review of Systems   A comprehensive 14-point review of systems was reviewed with patient and was negative other than as specified above.     Objective:   There were no vitals filed for this visit.     Physical Exam  Constitutional:       General: She is not in acute distress.     Appearance: She is well-developed. She is obese. She is not ill-appearing or diaphoretic.   HENT:      Head: Normocephalic and atraumatic.      Nose: No congestion or rhinorrhea.   Eyes:      General: No scleral icterus.     Extraocular Movements: Extraocular movements intact.   Neck:      Musculoskeletal: Normal range of motion and neck supple.   Pulmonary:      Effort: Pulmonary effort is normal. No respiratory distress.   Musculoskeletal: Normal range of motion.         General: No signs of injury.   Skin:     Findings: No rash.   Neurological:      Mental Status: She is alert and oriented to person, place, and time. Mental status is at baseline.   Psychiatric:         Behavior: Behavior normal.         Thought Content: Thought content normal.         Review of Symptoms    Symptom Assessment (ESAS 0-10 Scale)  Pain:  0  Dyspnea:  0  Anxiety:  0  Nausea:  0  Depression:  0  Anorexia:  0  Fatigue:  0  Insomnia:  0  Restlessness:  0  Agitation:  0     CAM / Delirium:  Negative  Constipation:  Negative  Diarrhea:  Negative              Assessment:       1. DNR (do not resuscitate)    2. Dyspnea, unspecified type    3. Stage 3 severe COPD by GOLD classification    4. Chronic pain syndrome    5. Hypoxemia requiring supplemental oxygen        Plan:           Problem List Items Addressed This Visit        Neuro    Chronic pain    Relevant Medications    HYDROcodone-acetaminophen (NORCO)  mg per tablet (Start on 1/10/2021)       Pulmonary    Hypoxemia requiring supplemental oxygen    Overview      Oxygen with sleep. Benefits from use.         Relevant Medications    HYDROcodone-acetaminophen (NORCO)  mg per tablet (Start on 1/10/2021)    Stage 3 severe COPD by GOLD classification    Overview     Stiolto, flovent Duoneb, Albuterol, oxygen         Relevant Medications    morphine (MS CONTIN) 15 MG 12 hr tablet (Start on 1/18/2021)    HYDROcodone-acetaminophen (NORCO)  mg per tablet (Start on 1/10/2021)       Palliative Care    DNR (do not resuscitate) - Primary    Overview      [place] post completed with patient and her  and scanned to chart.         Relevant Orders    Dnr (do not resuscitate)       Other    Dyspnea    Relevant Medications    morphine (MS CONTIN) 15 MG 12 hr tablet (Start on 1/18/2021)        Complexity of decision making HIGH based on severity of patients illnesss, advanced age, chronic medical conditions. High risk medication use requires careful dosing and monitoring due to risk of serious side effects.     Thank you for involving me in the care of this patient. Will continue to follow. RTC 3 months

## 2020-12-21 NOTE — TELEPHONE ENCOUNTER
Palliative Care Nurse Note:   Nurse spoke with pt prior to her video visit, she stated she will log in for it, pt has not logged into her apt as of this time, nurse called pt regarding the apt visit, no answer, unable to leave voice message.

## 2020-12-29 DIAGNOSIS — J44.9 COPD (CHRONIC OBSTRUCTIVE PULMONARY DISEASE): ICD-10-CM

## 2020-12-30 ENCOUNTER — PATIENT MESSAGE (OUTPATIENT)
Dept: PULMONOLOGY | Facility: CLINIC | Age: 60
End: 2020-12-30

## 2020-12-30 ENCOUNTER — PATIENT MESSAGE (OUTPATIENT)
Dept: PALLIATIVE MEDICINE | Facility: CLINIC | Age: 60
End: 2020-12-30

## 2020-12-30 DIAGNOSIS — J44.1 COPD EXACERBATION: ICD-10-CM

## 2020-12-30 RX ORDER — PREDNISONE 20 MG/1
TABLET ORAL
Qty: 11 TABLET | Refills: 0 | Status: SHIPPED | OUTPATIENT
Start: 2020-12-30 | End: 2021-02-03 | Stop reason: SDUPTHER

## 2020-12-31 RX ORDER — OMEPRAZOLE 20 MG/1
20 CAPSULE, DELAYED RELEASE ORAL DAILY
Qty: 90 CAPSULE | Refills: 3 | Status: SHIPPED | OUTPATIENT
Start: 2020-12-31

## 2021-01-03 ENCOUNTER — PATIENT MESSAGE (OUTPATIENT)
Dept: PALLIATIVE MEDICINE | Facility: CLINIC | Age: 61
End: 2021-01-03

## 2021-01-06 ENCOUNTER — PATIENT MESSAGE (OUTPATIENT)
Dept: PALLIATIVE MEDICINE | Facility: CLINIC | Age: 61
End: 2021-01-06

## 2021-01-06 ENCOUNTER — OFFICE VISIT (OUTPATIENT)
Dept: PALLIATIVE MEDICINE | Facility: CLINIC | Age: 61
End: 2021-01-06
Payer: MEDICAID

## 2021-01-06 DIAGNOSIS — G89.4 CHRONIC PAIN SYNDROME: ICD-10-CM

## 2021-01-06 DIAGNOSIS — J44.9 STAGE 3 SEVERE COPD BY GOLD CLASSIFICATION: ICD-10-CM

## 2021-01-06 DIAGNOSIS — R09.02 HYPOXEMIA REQUIRING SUPPLEMENTAL OXYGEN: ICD-10-CM

## 2021-01-06 DIAGNOSIS — Z99.81 HYPOXEMIA REQUIRING SUPPLEMENTAL OXYGEN: ICD-10-CM

## 2021-01-06 DIAGNOSIS — R06.00 DYSPNEA, UNSPECIFIED TYPE: ICD-10-CM

## 2021-01-06 PROCEDURE — 99215 OFFICE O/P EST HI 40 MIN: CPT | Mod: 95,,, | Performed by: FAMILY MEDICINE

## 2021-01-06 PROCEDURE — 99215 PR OFFICE/OUTPT VISIT, EST, LEVL V, 40-54 MIN: ICD-10-PCS | Mod: 95,,, | Performed by: FAMILY MEDICINE

## 2021-01-06 RX ORDER — HYDROCODONE BITARTRATE AND ACETAMINOPHEN 10; 325 MG/1; MG/1
1 TABLET ORAL EVERY 6 HOURS PRN
Qty: 120 TABLET | Refills: 0 | Status: SHIPPED | OUTPATIENT
Start: 2021-01-10 | End: 2021-01-08 | Stop reason: SDUPTHER

## 2021-01-06 RX ORDER — MORPHINE SULFATE 15 MG/1
15 TABLET, FILM COATED, EXTENDED RELEASE ORAL EVERY 12 HOURS
Qty: 60 TABLET | Refills: 0 | Status: SHIPPED | OUTPATIENT
Start: 2021-01-18 | End: 2021-02-03 | Stop reason: SDUPTHER

## 2021-01-08 ENCOUNTER — PATIENT MESSAGE (OUTPATIENT)
Dept: PALLIATIVE MEDICINE | Facility: CLINIC | Age: 61
End: 2021-01-08

## 2021-01-08 DIAGNOSIS — G89.4 CHRONIC PAIN SYNDROME: ICD-10-CM

## 2021-01-08 DIAGNOSIS — R09.02 HYPOXEMIA REQUIRING SUPPLEMENTAL OXYGEN: ICD-10-CM

## 2021-01-08 DIAGNOSIS — Z99.81 HYPOXEMIA REQUIRING SUPPLEMENTAL OXYGEN: ICD-10-CM

## 2021-01-08 DIAGNOSIS — J44.9 STAGE 3 SEVERE COPD BY GOLD CLASSIFICATION: ICD-10-CM

## 2021-01-08 RX ORDER — HYDROCODONE BITARTRATE AND ACETAMINOPHEN 10; 325 MG/1; MG/1
1 TABLET ORAL EVERY 6 HOURS PRN
Qty: 120 TABLET | Refills: 0 | Status: SHIPPED | OUTPATIENT
Start: 2021-01-08 | End: 2021-02-03 | Stop reason: SDUPTHER

## 2021-01-14 ENCOUNTER — PATIENT MESSAGE (OUTPATIENT)
Dept: PALLIATIVE MEDICINE | Facility: CLINIC | Age: 61
End: 2021-01-14

## 2021-01-15 ENCOUNTER — PATIENT MESSAGE (OUTPATIENT)
Dept: PALLIATIVE MEDICINE | Facility: CLINIC | Age: 61
End: 2021-01-15

## 2021-02-03 ENCOUNTER — PATIENT MESSAGE (OUTPATIENT)
Dept: PALLIATIVE MEDICINE | Facility: CLINIC | Age: 61
End: 2021-02-03

## 2021-02-03 ENCOUNTER — OFFICE VISIT (OUTPATIENT)
Dept: PALLIATIVE MEDICINE | Facility: CLINIC | Age: 61
End: 2021-02-03
Payer: MEDICAID

## 2021-02-03 DIAGNOSIS — R06.00 DYSPNEA, UNSPECIFIED TYPE: ICD-10-CM

## 2021-02-03 DIAGNOSIS — G89.4 CHRONIC PAIN SYNDROME: ICD-10-CM

## 2021-02-03 DIAGNOSIS — Z99.81 HYPOXEMIA REQUIRING SUPPLEMENTAL OXYGEN: ICD-10-CM

## 2021-02-03 DIAGNOSIS — J44.9 STAGE 3 SEVERE COPD BY GOLD CLASSIFICATION: ICD-10-CM

## 2021-02-03 DIAGNOSIS — R09.02 HYPOXEMIA REQUIRING SUPPLEMENTAL OXYGEN: ICD-10-CM

## 2021-02-03 PROCEDURE — 99215 OFFICE O/P EST HI 40 MIN: CPT | Mod: 95,,, | Performed by: FAMILY MEDICINE

## 2021-02-03 PROCEDURE — 99215 PR OFFICE/OUTPT VISIT, EST, LEVL V, 40-54 MIN: ICD-10-PCS | Mod: 95,,, | Performed by: FAMILY MEDICINE

## 2021-02-03 RX ORDER — HYDROCODONE BITARTRATE AND ACETAMINOPHEN 10; 325 MG/1; MG/1
1 TABLET ORAL EVERY 6 HOURS PRN
Qty: 120 TABLET | Refills: 0 | Status: SHIPPED | OUTPATIENT
Start: 2021-02-03 | End: 2021-03-02 | Stop reason: SDUPTHER

## 2021-02-03 RX ORDER — MORPHINE SULFATE 15 MG/1
15 TABLET, FILM COATED, EXTENDED RELEASE ORAL EVERY 12 HOURS
Qty: 60 TABLET | Refills: 0 | Status: SHIPPED | OUTPATIENT
Start: 2021-02-03 | End: 2021-03-15 | Stop reason: SDUPTHER

## 2021-02-11 ENCOUNTER — PATIENT MESSAGE (OUTPATIENT)
Dept: PALLIATIVE MEDICINE | Facility: CLINIC | Age: 61
End: 2021-02-11

## 2021-02-12 ENCOUNTER — PATIENT MESSAGE (OUTPATIENT)
Dept: PULMONOLOGY | Facility: CLINIC | Age: 61
End: 2021-02-12

## 2021-02-18 ENCOUNTER — PATIENT MESSAGE (OUTPATIENT)
Dept: PALLIATIVE MEDICINE | Facility: CLINIC | Age: 61
End: 2021-02-18

## 2021-02-18 ENCOUNTER — PATIENT MESSAGE (OUTPATIENT)
Dept: PULMONOLOGY | Facility: CLINIC | Age: 61
End: 2021-02-18

## 2021-02-19 ENCOUNTER — PATIENT MESSAGE (OUTPATIENT)
Dept: PULMONOLOGY | Facility: CLINIC | Age: 61
End: 2021-02-19

## 2021-02-22 ENCOUNTER — PATIENT MESSAGE (OUTPATIENT)
Dept: PALLIATIVE MEDICINE | Facility: CLINIC | Age: 61
End: 2021-02-22

## 2021-02-22 ENCOUNTER — PATIENT MESSAGE (OUTPATIENT)
Dept: PULMONOLOGY | Facility: CLINIC | Age: 61
End: 2021-02-22

## 2021-02-22 ENCOUNTER — TELEPHONE (OUTPATIENT)
Dept: PULMONOLOGY | Facility: CLINIC | Age: 61
End: 2021-02-22

## 2021-02-22 RX ORDER — DOXYCYCLINE 100 MG/1
CAPSULE ORAL
COMMUNITY
Start: 2021-02-17 | End: 2021-10-15

## 2021-02-22 RX ORDER — INSULIN GLARGINE 100 [IU]/ML
INJECTION, SOLUTION SUBCUTANEOUS
COMMUNITY
Start: 2021-02-05

## 2021-02-22 RX ORDER — NYSTATIN 100000 [USP'U]/ML
SUSPENSION ORAL
COMMUNITY
Start: 2020-12-09

## 2021-02-24 ENCOUNTER — OFFICE VISIT (OUTPATIENT)
Dept: PALLIATIVE MEDICINE | Facility: CLINIC | Age: 61
End: 2021-02-24
Payer: MEDICAID

## 2021-02-24 DIAGNOSIS — J44.1 COPD WITH ACUTE EXACERBATION: Primary | ICD-10-CM

## 2021-02-24 PROCEDURE — 99214 PR OFFICE/OUTPT VISIT, EST, LEVL IV, 30-39 MIN: ICD-10-PCS | Mod: 95,,, | Performed by: FAMILY MEDICINE

## 2021-02-24 PROCEDURE — 99214 OFFICE O/P EST MOD 30 MIN: CPT | Mod: 95,,, | Performed by: FAMILY MEDICINE

## 2021-03-02 DIAGNOSIS — J44.9 STAGE 3 SEVERE COPD BY GOLD CLASSIFICATION: ICD-10-CM

## 2021-03-02 DIAGNOSIS — Z99.81 HYPOXEMIA REQUIRING SUPPLEMENTAL OXYGEN: ICD-10-CM

## 2021-03-02 DIAGNOSIS — R09.02 HYPOXEMIA REQUIRING SUPPLEMENTAL OXYGEN: ICD-10-CM

## 2021-03-02 DIAGNOSIS — G89.4 CHRONIC PAIN SYNDROME: ICD-10-CM

## 2021-03-02 RX ORDER — HYDROCODONE BITARTRATE AND ACETAMINOPHEN 10; 325 MG/1; MG/1
1 TABLET ORAL EVERY 6 HOURS PRN
Qty: 120 TABLET | Refills: 0 | Status: SHIPPED | OUTPATIENT
Start: 2021-03-02 | End: 2021-03-29 | Stop reason: SDUPTHER

## 2021-03-05 ENCOUNTER — PATIENT MESSAGE (OUTPATIENT)
Dept: PULMONOLOGY | Facility: CLINIC | Age: 61
End: 2021-03-05

## 2021-03-05 DIAGNOSIS — R06.02 SOB (SHORTNESS OF BREATH): Primary | ICD-10-CM

## 2021-03-15 DIAGNOSIS — J44.9 STAGE 3 SEVERE COPD BY GOLD CLASSIFICATION: ICD-10-CM

## 2021-03-15 DIAGNOSIS — R06.00 DYSPNEA, UNSPECIFIED TYPE: ICD-10-CM

## 2021-03-15 RX ORDER — MORPHINE SULFATE 15 MG/1
15 TABLET, FILM COATED, EXTENDED RELEASE ORAL EVERY 12 HOURS
Qty: 60 TABLET | Refills: 0 | Status: SHIPPED | OUTPATIENT
Start: 2021-03-15 | End: 2021-04-09 | Stop reason: SDUPTHER

## 2021-03-28 ENCOUNTER — LAB VISIT (OUTPATIENT)
Dept: OTOLARYNGOLOGY | Facility: CLINIC | Age: 61
End: 2021-03-28
Payer: MEDICAID

## 2021-03-28 DIAGNOSIS — J44.9 COPD (CHRONIC OBSTRUCTIVE PULMONARY DISEASE): ICD-10-CM

## 2021-03-28 PROCEDURE — U0005 INFEC AGEN DETEC AMPLI PROBE: HCPCS | Performed by: NURSE PRACTITIONER

## 2021-03-28 PROCEDURE — U0003 INFECTIOUS AGENT DETECTION BY NUCLEIC ACID (DNA OR RNA); SEVERE ACUTE RESPIRATORY SYNDROME CORONAVIRUS 2 (SARS-COV-2) (CORONAVIRUS DISEASE [COVID-19]), AMPLIFIED PROBE TECHNIQUE, MAKING USE OF HIGH THROUGHPUT TECHNOLOGIES AS DESCRIBED BY CMS-2020-01-R: HCPCS | Performed by: NURSE PRACTITIONER

## 2021-03-29 DIAGNOSIS — Z99.81 HYPOXEMIA REQUIRING SUPPLEMENTAL OXYGEN: ICD-10-CM

## 2021-03-29 DIAGNOSIS — R09.02 HYPOXEMIA REQUIRING SUPPLEMENTAL OXYGEN: ICD-10-CM

## 2021-03-29 DIAGNOSIS — J44.9 STAGE 3 SEVERE COPD BY GOLD CLASSIFICATION: ICD-10-CM

## 2021-03-29 DIAGNOSIS — G89.4 CHRONIC PAIN SYNDROME: ICD-10-CM

## 2021-03-29 LAB — SARS-COV-2 RNA RESP QL NAA+PROBE: NOT DETECTED

## 2021-03-29 RX ORDER — HYDROCODONE BITARTRATE AND ACETAMINOPHEN 10; 325 MG/1; MG/1
1 TABLET ORAL EVERY 6 HOURS PRN
Qty: 120 TABLET | Refills: 0 | Status: SHIPPED | OUTPATIENT
Start: 2021-03-29 | End: 2021-04-28 | Stop reason: SDUPTHER

## 2021-03-30 ENCOUNTER — CLINICAL SUPPORT (OUTPATIENT)
Dept: PULMONOLOGY | Facility: CLINIC | Age: 61
End: 2021-03-30
Payer: MEDICAID

## 2021-03-30 ENCOUNTER — CLINICAL SUPPORT (OUTPATIENT)
Dept: SMOKING CESSATION | Facility: CLINIC | Age: 61
End: 2021-03-30
Payer: COMMERCIAL

## 2021-03-30 ENCOUNTER — PATIENT MESSAGE (OUTPATIENT)
Dept: SLEEP MEDICINE | Facility: CLINIC | Age: 61
End: 2021-03-30

## 2021-03-30 VITALS — HEIGHT: 63 IN | BODY MASS INDEX: 29.61 KG/M2 | WEIGHT: 167.13 LBS

## 2021-03-30 DIAGNOSIS — F17.200 NICOTINE DEPENDENCE: Primary | ICD-10-CM

## 2021-03-30 DIAGNOSIS — F17.200 TOBACCO DEPENDENCE: Chronic | ICD-10-CM

## 2021-03-30 DIAGNOSIS — Z99.81 HYPOXEMIA REQUIRING SUPPLEMENTAL OXYGEN: ICD-10-CM

## 2021-03-30 DIAGNOSIS — J96.21 ACUTE ON CHRONIC RESPIRATORY FAILURE WITH HYPOXIA: ICD-10-CM

## 2021-03-30 DIAGNOSIS — J44.1 COPD EXACERBATION: ICD-10-CM

## 2021-03-30 DIAGNOSIS — R06.02 SOB (SHORTNESS OF BREATH): ICD-10-CM

## 2021-03-30 DIAGNOSIS — R09.02 HYPOXEMIA REQUIRING SUPPLEMENTAL OXYGEN: ICD-10-CM

## 2021-03-30 DIAGNOSIS — J44.9 STAGE 3 SEVERE COPD BY GOLD CLASSIFICATION: ICD-10-CM

## 2021-03-30 PROCEDURE — 94010 BREATHING CAPACITY TEST: ICD-10-PCS | Mod: 26,59,S$PBB, | Performed by: INTERNAL MEDICINE

## 2021-03-30 PROCEDURE — 99406 PR TOBACCO USE CESSATION INTERMEDIATE 3-10 MINUTES: ICD-10-PCS | Mod: S$GLB,,,

## 2021-03-30 PROCEDURE — 94010 BREATHING CAPACITY TEST: CPT | Mod: 26,59,S$PBB, | Performed by: INTERNAL MEDICINE

## 2021-03-30 PROCEDURE — 94618 PULMONARY STRESS TESTING: CPT | Mod: PBBFAC

## 2021-03-30 PROCEDURE — 94618 PULMONARY STRESS TESTING: ICD-10-PCS | Mod: 26,S$PBB,, | Performed by: INTERNAL MEDICINE

## 2021-03-30 PROCEDURE — 99406 BEHAV CHNG SMOKING 3-10 MIN: CPT | Mod: S$GLB,,,

## 2021-03-30 PROCEDURE — 94618 PULMONARY STRESS TESTING: CPT | Mod: 26,S$PBB,, | Performed by: INTERNAL MEDICINE

## 2021-03-30 PROCEDURE — 94010 BREATHING CAPACITY TEST: CPT | Mod: PBBFAC

## 2021-03-31 LAB
BRPFT: ABNORMAL
FEF 25 75 LLN: 1.17
FEF 25 75 PRE REF: 9.9 %
FEF 25 75 REF: 2.26
FEV1 FVC LLN: 68
FEV1 FVC PRE REF: 43.4 %
FEV1 FVC REF: 80
FEV1 LLN: 1.85
FEV1 PRE REF: 33.8 %
FEV1 REF: 2.44
FVC LLN: 2.35
FVC PRE REF: 77.4 %
FVC REF: 3.08
PEF LLN: 4.54
PEF PRE REF: 44.9 %
PEF REF: 6.19
PRE FEF 25 75: 0.22 L/S (ref 1.17–3.36)
PRE FET 100: 18.3 SEC
PRE FEV1 FVC: 34.55 % (ref 67.66–91.52)
PRE FEV1: 0.82 L (ref 1.85–3.03)
PRE FVC: 2.39 L (ref 2.35–3.82)
PRE PEF: 2.78 L/S (ref 4.54–7.85)

## 2021-04-01 ENCOUNTER — OFFICE VISIT (OUTPATIENT)
Dept: SLEEP MEDICINE | Facility: CLINIC | Age: 61
End: 2021-04-01
Payer: MEDICAID

## 2021-04-01 ENCOUNTER — PATIENT MESSAGE (OUTPATIENT)
Dept: SLEEP MEDICINE | Facility: CLINIC | Age: 61
End: 2021-04-01

## 2021-04-01 VITALS
BODY MASS INDEX: 28.67 KG/M2 | HEART RATE: 111 BPM | SYSTOLIC BLOOD PRESSURE: 120 MMHG | HEIGHT: 63 IN | OXYGEN SATURATION: 93 % | DIASTOLIC BLOOD PRESSURE: 80 MMHG | WEIGHT: 161.81 LBS | RESPIRATION RATE: 16 BRPM

## 2021-04-01 DIAGNOSIS — E11.9 TYPE 2 DIABETES MELLITUS WITHOUT COMPLICATION, WITHOUT LONG-TERM CURRENT USE OF INSULIN: ICD-10-CM

## 2021-04-01 DIAGNOSIS — J44.9 STAGE 3 SEVERE COPD BY GOLD CLASSIFICATION: Primary | ICD-10-CM

## 2021-04-01 DIAGNOSIS — R05.3 CHRONIC COUGH: ICD-10-CM

## 2021-04-01 DIAGNOSIS — R09.02 HYPOXEMIA REQUIRING SUPPLEMENTAL OXYGEN: ICD-10-CM

## 2021-04-01 DIAGNOSIS — I70.0 AORTIC ATHEROSCLEROSIS: ICD-10-CM

## 2021-04-01 DIAGNOSIS — Z99.81 HYPOXEMIA REQUIRING SUPPLEMENTAL OXYGEN: ICD-10-CM

## 2021-04-01 DIAGNOSIS — F17.200 TOBACCO DEPENDENCE: Chronic | ICD-10-CM

## 2021-04-01 PROCEDURE — 99999 PR PBB SHADOW E&M-EST. PATIENT-LVL V: CPT | Mod: PBBFAC,,, | Performed by: INTERNAL MEDICINE

## 2021-04-01 PROCEDURE — 99215 OFFICE O/P EST HI 40 MIN: CPT | Mod: PBBFAC | Performed by: INTERNAL MEDICINE

## 2021-04-01 PROCEDURE — 99999 PR PBB SHADOW E&M-EST. PATIENT-LVL V: ICD-10-PCS | Mod: PBBFAC,,, | Performed by: INTERNAL MEDICINE

## 2021-04-01 PROCEDURE — 99214 OFFICE O/P EST MOD 30 MIN: CPT | Mod: S$PBB,,, | Performed by: INTERNAL MEDICINE

## 2021-04-01 PROCEDURE — 99214 PR OFFICE/OUTPT VISIT, EST, LEVL IV, 30-39 MIN: ICD-10-PCS | Mod: S$PBB,,, | Performed by: INTERNAL MEDICINE

## 2021-04-14 ENCOUNTER — PATIENT MESSAGE (OUTPATIENT)
Dept: SLEEP MEDICINE | Facility: CLINIC | Age: 61
End: 2021-04-14

## 2021-04-22 ENCOUNTER — PATIENT MESSAGE (OUTPATIENT)
Dept: PALLIATIVE MEDICINE | Facility: CLINIC | Age: 61
End: 2021-04-22

## 2021-04-28 ENCOUNTER — OFFICE VISIT (OUTPATIENT)
Dept: PALLIATIVE MEDICINE | Facility: CLINIC | Age: 61
End: 2021-04-28
Payer: MEDICAID

## 2021-04-28 DIAGNOSIS — Z99.81 HYPOXEMIA REQUIRING SUPPLEMENTAL OXYGEN: ICD-10-CM

## 2021-04-28 DIAGNOSIS — J44.9 STAGE 3 SEVERE COPD BY GOLD CLASSIFICATION: ICD-10-CM

## 2021-04-28 DIAGNOSIS — G89.4 CHRONIC PAIN SYNDROME: ICD-10-CM

## 2021-04-28 DIAGNOSIS — R09.02 HYPOXEMIA REQUIRING SUPPLEMENTAL OXYGEN: ICD-10-CM

## 2021-04-28 DIAGNOSIS — R06.00 DYSPNEA, UNSPECIFIED TYPE: ICD-10-CM

## 2021-04-28 PROCEDURE — 99214 PR OFFICE/OUTPT VISIT, EST, LEVL IV, 30-39 MIN: ICD-10-PCS | Mod: 95,,, | Performed by: FAMILY MEDICINE

## 2021-04-28 PROCEDURE — 99214 OFFICE O/P EST MOD 30 MIN: CPT | Mod: 95,,, | Performed by: FAMILY MEDICINE

## 2021-04-28 RX ORDER — HYDROCODONE BITARTRATE AND ACETAMINOPHEN 10; 325 MG/1; MG/1
1 TABLET ORAL EVERY 6 HOURS PRN
Qty: 120 TABLET | Refills: 0 | Status: SHIPPED | OUTPATIENT
Start: 2021-04-28 | End: 2021-04-28 | Stop reason: SDUPTHER

## 2021-04-28 RX ORDER — MORPHINE SULFATE 15 MG/1
15 TABLET, FILM COATED, EXTENDED RELEASE ORAL EVERY 12 HOURS
Qty: 60 TABLET | Refills: 0 | Status: SHIPPED | OUTPATIENT
Start: 2021-04-28 | End: 2021-04-28 | Stop reason: SDUPTHER

## 2021-04-28 RX ORDER — MORPHINE SULFATE 15 MG/1
15 TABLET, FILM COATED, EXTENDED RELEASE ORAL EVERY 12 HOURS
Qty: 60 TABLET | Refills: 0 | Status: SHIPPED | OUTPATIENT
Start: 2021-04-28 | End: 2021-05-20 | Stop reason: SDUPTHER

## 2021-04-28 RX ORDER — HYDROCODONE BITARTRATE AND ACETAMINOPHEN 10; 325 MG/1; MG/1
1 TABLET ORAL EVERY 6 HOURS PRN
Qty: 120 TABLET | Refills: 0 | Status: SHIPPED | OUTPATIENT
Start: 2021-04-28 | End: 2021-05-20 | Stop reason: SDUPTHER

## 2021-05-10 ENCOUNTER — PATIENT MESSAGE (OUTPATIENT)
Dept: SLEEP MEDICINE | Facility: CLINIC | Age: 61
End: 2021-05-10

## 2021-05-10 ENCOUNTER — PATIENT MESSAGE (OUTPATIENT)
Dept: PALLIATIVE MEDICINE | Facility: CLINIC | Age: 61
End: 2021-05-10

## 2021-05-10 DIAGNOSIS — J44.9 STAGE 3 SEVERE COPD BY GOLD CLASSIFICATION: Primary | ICD-10-CM

## 2021-05-18 ENCOUNTER — PATIENT MESSAGE (OUTPATIENT)
Dept: SLEEP MEDICINE | Facility: CLINIC | Age: 61
End: 2021-05-18

## 2021-05-20 ENCOUNTER — OFFICE VISIT (OUTPATIENT)
Dept: PALLIATIVE MEDICINE | Facility: CLINIC | Age: 61
End: 2021-05-20
Payer: MEDICAID

## 2021-05-20 ENCOUNTER — DOCUMENTATION ONLY (OUTPATIENT)
Dept: PALLIATIVE MEDICINE | Facility: HOSPITAL | Age: 61
End: 2021-05-20

## 2021-05-20 DIAGNOSIS — R09.02 HYPOXEMIA REQUIRING SUPPLEMENTAL OXYGEN: ICD-10-CM

## 2021-05-20 DIAGNOSIS — R09.02 HYPOXEMIA REQUIRING SUPPLEMENTAL OXYGEN: Primary | ICD-10-CM

## 2021-05-20 DIAGNOSIS — R06.00 DYSPNEA, UNSPECIFIED TYPE: ICD-10-CM

## 2021-05-20 DIAGNOSIS — J44.9 STAGE 3 SEVERE COPD BY GOLD CLASSIFICATION: ICD-10-CM

## 2021-05-20 DIAGNOSIS — Z99.81 HYPOXEMIA REQUIRING SUPPLEMENTAL OXYGEN: Primary | ICD-10-CM

## 2021-05-20 DIAGNOSIS — Z99.81 HYPOXEMIA REQUIRING SUPPLEMENTAL OXYGEN: ICD-10-CM

## 2021-05-20 DIAGNOSIS — G89.4 CHRONIC PAIN SYNDROME: ICD-10-CM

## 2021-05-20 PROCEDURE — 99215 PR OFFICE/OUTPT VISIT, EST, LEVL V, 40-54 MIN: ICD-10-PCS | Mod: ,,, | Performed by: FAMILY MEDICINE

## 2021-05-20 PROCEDURE — 99215 OFFICE O/P EST HI 40 MIN: CPT | Mod: ,,, | Performed by: FAMILY MEDICINE

## 2021-05-20 RX ORDER — MORPHINE SULFATE 15 MG/1
15 TABLET, FILM COATED, EXTENDED RELEASE ORAL EVERY 12 HOURS
Qty: 60 TABLET | Refills: 0 | Status: SHIPPED | OUTPATIENT
Start: 2021-05-20 | End: 2021-06-21 | Stop reason: SDUPTHER

## 2021-05-20 RX ORDER — HYDROCODONE BITARTRATE AND ACETAMINOPHEN 10; 325 MG/1; MG/1
1 TABLET ORAL EVERY 6 HOURS PRN
Qty: 120 TABLET | Refills: 0 | Status: SHIPPED | OUTPATIENT
Start: 2021-05-20 | End: 2021-06-21 | Stop reason: SDUPTHER

## 2021-06-02 ENCOUNTER — PATIENT MESSAGE (OUTPATIENT)
Dept: PALLIATIVE MEDICINE | Facility: CLINIC | Age: 61
End: 2021-06-02

## 2021-06-11 ENCOUNTER — PATIENT MESSAGE (OUTPATIENT)
Dept: PALLIATIVE MEDICINE | Facility: CLINIC | Age: 61
End: 2021-06-11

## 2021-06-16 ENCOUNTER — PATIENT MESSAGE (OUTPATIENT)
Dept: PALLIATIVE MEDICINE | Facility: CLINIC | Age: 61
End: 2021-06-16

## 2021-06-17 ENCOUNTER — OFFICE VISIT (OUTPATIENT)
Dept: PALLIATIVE MEDICINE | Facility: CLINIC | Age: 61
End: 2021-06-17
Payer: MEDICAID

## 2021-06-17 DIAGNOSIS — R06.00 DYSPNEA, UNSPECIFIED TYPE: ICD-10-CM

## 2021-06-17 PROCEDURE — 99214 OFFICE O/P EST MOD 30 MIN: CPT | Mod: 95,,, | Performed by: FAMILY MEDICINE

## 2021-06-17 PROCEDURE — 99214 PR OFFICE/OUTPT VISIT, EST, LEVL IV, 30-39 MIN: ICD-10-PCS | Mod: 95,,, | Performed by: FAMILY MEDICINE

## 2021-06-18 ENCOUNTER — PATIENT MESSAGE (OUTPATIENT)
Dept: PALLIATIVE MEDICINE | Facility: CLINIC | Age: 61
End: 2021-06-18

## 2021-06-21 ENCOUNTER — PATIENT MESSAGE (OUTPATIENT)
Dept: PALLIATIVE MEDICINE | Facility: CLINIC | Age: 61
End: 2021-06-21

## 2021-06-21 DIAGNOSIS — R06.00 DYSPNEA, UNSPECIFIED TYPE: ICD-10-CM

## 2021-06-21 DIAGNOSIS — J44.9 STAGE 3 SEVERE COPD BY GOLD CLASSIFICATION: ICD-10-CM

## 2021-06-21 DIAGNOSIS — R09.02 HYPOXEMIA REQUIRING SUPPLEMENTAL OXYGEN: ICD-10-CM

## 2021-06-21 DIAGNOSIS — Z99.81 HYPOXEMIA REQUIRING SUPPLEMENTAL OXYGEN: ICD-10-CM

## 2021-06-21 DIAGNOSIS — G89.4 CHRONIC PAIN SYNDROME: ICD-10-CM

## 2021-06-21 RX ORDER — HYDROCODONE BITARTRATE AND ACETAMINOPHEN 10; 325 MG/1; MG/1
1 TABLET ORAL EVERY 6 HOURS PRN
Qty: 120 TABLET | Refills: 0 | Status: SHIPPED | OUTPATIENT
Start: 2021-06-21 | End: 2021-07-22 | Stop reason: SDUPTHER

## 2021-06-21 RX ORDER — MORPHINE SULFATE 15 MG/1
15 TABLET, FILM COATED, EXTENDED RELEASE ORAL EVERY 12 HOURS
Qty: 60 TABLET | Refills: 0 | Status: SHIPPED | OUTPATIENT
Start: 2021-06-21 | End: 2021-08-02 | Stop reason: SDUPTHER

## 2021-06-23 ENCOUNTER — PATIENT MESSAGE (OUTPATIENT)
Dept: SLEEP MEDICINE | Facility: CLINIC | Age: 61
End: 2021-06-23

## 2021-06-23 DIAGNOSIS — J44.1 COPD EXACERBATION: Primary | ICD-10-CM

## 2021-06-24 RX ORDER — AZITHROMYCIN 250 MG/1
TABLET, FILM COATED ORAL
Qty: 6 TABLET | Refills: 0 | Status: SHIPPED | OUTPATIENT
Start: 2021-06-24 | End: 2021-10-15

## 2021-07-22 ENCOUNTER — PATIENT MESSAGE (OUTPATIENT)
Dept: PALLIATIVE MEDICINE | Facility: CLINIC | Age: 61
End: 2021-07-22

## 2021-07-23 ENCOUNTER — PATIENT MESSAGE (OUTPATIENT)
Dept: PALLIATIVE MEDICINE | Facility: CLINIC | Age: 61
End: 2021-07-23

## 2021-07-26 ENCOUNTER — PATIENT MESSAGE (OUTPATIENT)
Dept: PULMONOLOGY | Facility: CLINIC | Age: 61
End: 2021-07-26

## 2021-07-27 ENCOUNTER — PATIENT MESSAGE (OUTPATIENT)
Dept: PALLIATIVE MEDICINE | Facility: CLINIC | Age: 61
End: 2021-07-27

## 2021-07-28 ENCOUNTER — PATIENT MESSAGE (OUTPATIENT)
Dept: PALLIATIVE MEDICINE | Facility: CLINIC | Age: 61
End: 2021-07-28

## 2021-07-28 DIAGNOSIS — R06.02 SOB (SHORTNESS OF BREATH): Primary | ICD-10-CM

## 2021-08-06 ENCOUNTER — PATIENT MESSAGE (OUTPATIENT)
Dept: PALLIATIVE MEDICINE | Facility: CLINIC | Age: 61
End: 2021-08-06

## 2021-08-09 ENCOUNTER — PATIENT MESSAGE (OUTPATIENT)
Dept: PALLIATIVE MEDICINE | Facility: CLINIC | Age: 61
End: 2021-08-09

## 2021-08-10 ENCOUNTER — TELEPHONE (OUTPATIENT)
Dept: PAIN MEDICINE | Facility: CLINIC | Age: 61
End: 2021-08-10

## 2021-08-10 DIAGNOSIS — G89.4 CHRONIC PAIN SYNDROME: Primary | ICD-10-CM

## 2021-08-12 ENCOUNTER — OFFICE VISIT (OUTPATIENT)
Dept: PRIMARY CARE CLINIC | Facility: CLINIC | Age: 61
End: 2021-08-12
Payer: MEDICAID

## 2021-08-12 ENCOUNTER — TELEPHONE (OUTPATIENT)
Dept: PAIN MEDICINE | Facility: CLINIC | Age: 61
End: 2021-08-12

## 2021-08-12 DIAGNOSIS — J44.9 STAGE 3 SEVERE COPD BY GOLD CLASSIFICATION: ICD-10-CM

## 2021-08-12 DIAGNOSIS — M51.9 DISORDER OF INTERVERTEBRAL DISC: Primary | ICD-10-CM

## 2021-08-12 DIAGNOSIS — J96.11 CHRONIC RESPIRATORY FAILURE WITH HYPOXIA: ICD-10-CM

## 2021-08-12 DIAGNOSIS — G89.4 CHRONIC PAIN SYNDROME: ICD-10-CM

## 2021-08-12 PROCEDURE — 99497 ADVNCD CARE PLAN 30 MIN: CPT | Mod: 95,,, | Performed by: NURSE PRACTITIONER

## 2021-08-12 PROCEDURE — 99214 PR OFFICE/OUTPT VISIT, EST, LEVL IV, 30-39 MIN: ICD-10-PCS | Mod: 95,,, | Performed by: NURSE PRACTITIONER

## 2021-08-12 PROCEDURE — 99214 OFFICE O/P EST MOD 30 MIN: CPT | Mod: 95,,, | Performed by: NURSE PRACTITIONER

## 2021-08-12 PROCEDURE — 99497 PR ADVNCD CARE PLAN 30 MIN: ICD-10-PCS | Mod: 95,,, | Performed by: NURSE PRACTITIONER

## 2021-08-20 ENCOUNTER — PATIENT MESSAGE (OUTPATIENT)
Dept: PRIMARY CARE CLINIC | Facility: CLINIC | Age: 61
End: 2021-08-20

## 2021-08-20 RX ORDER — HYDROCODONE BITARTRATE AND ACETAMINOPHEN 10; 325 MG/1; MG/1
1 TABLET ORAL EVERY 6 HOURS PRN
Qty: 120 TABLET | Refills: 0 | Status: SHIPPED | OUTPATIENT
Start: 2021-08-20 | End: 2021-09-14 | Stop reason: SDUPTHER

## 2021-08-25 DIAGNOSIS — F11.90 CHRONIC, CONTINUOUS USE OF OPIOIDS: Primary | ICD-10-CM

## 2021-09-02 ENCOUNTER — PATIENT MESSAGE (OUTPATIENT)
Dept: PULMONOLOGY | Facility: CLINIC | Age: 61
End: 2021-09-02

## 2021-09-08 ENCOUNTER — PATIENT MESSAGE (OUTPATIENT)
Dept: PULMONOLOGY | Facility: CLINIC | Age: 61
End: 2021-09-08

## 2021-09-08 ENCOUNTER — PATIENT MESSAGE (OUTPATIENT)
Dept: PALLIATIVE MEDICINE | Facility: CLINIC | Age: 61
End: 2021-09-08

## 2021-09-10 ENCOUNTER — PATIENT MESSAGE (OUTPATIENT)
Dept: PALLIATIVE MEDICINE | Facility: CLINIC | Age: 61
End: 2021-09-10

## 2021-09-10 ENCOUNTER — OFFICE VISIT (OUTPATIENT)
Dept: PALLIATIVE MEDICINE | Facility: CLINIC | Age: 61
End: 2021-09-10
Payer: MEDICAID

## 2021-09-10 DIAGNOSIS — R06.02 SHORTNESS OF BREATH: ICD-10-CM

## 2021-09-10 DIAGNOSIS — J44.9 STAGE 3 SEVERE COPD BY GOLD CLASSIFICATION: ICD-10-CM

## 2021-09-10 PROCEDURE — 99214 OFFICE O/P EST MOD 30 MIN: CPT | Mod: 95,,, | Performed by: INTERNAL MEDICINE

## 2021-09-10 PROCEDURE — 99214 PR OFFICE/OUTPT VISIT, EST, LEVL IV, 30-39 MIN: ICD-10-PCS | Mod: 95,,, | Performed by: INTERNAL MEDICINE

## 2021-09-10 RX ORDER — PROMETHAZINE HYDROCHLORIDE 6.25 MG/5ML
6.25 SYRUP ORAL EVERY 6 HOURS PRN
Qty: 240 ML | Refills: 6 | Status: SHIPPED | OUTPATIENT
Start: 2021-09-10

## 2021-09-13 ENCOUNTER — PATIENT MESSAGE (OUTPATIENT)
Dept: PALLIATIVE MEDICINE | Facility: CLINIC | Age: 61
End: 2021-09-13

## 2021-09-13 DIAGNOSIS — J96.11 CHRONIC RESPIRATORY FAILURE WITH HYPOXIA: ICD-10-CM

## 2021-09-14 ENCOUNTER — PATIENT MESSAGE (OUTPATIENT)
Dept: PALLIATIVE MEDICINE | Facility: CLINIC | Age: 61
End: 2021-09-14

## 2021-09-14 RX ORDER — HYDROCODONE BITARTRATE AND ACETAMINOPHEN 10; 325 MG/1; MG/1
1 TABLET ORAL EVERY 6 HOURS PRN
Qty: 120 TABLET | Refills: 0 | Status: SHIPPED | OUTPATIENT
Start: 2021-09-24 | End: 2021-09-15 | Stop reason: SDUPTHER

## 2021-09-14 RX ORDER — HYDROCODONE BITARTRATE AND ACETAMINOPHEN 10; 325 MG/1; MG/1
1 TABLET ORAL EVERY 6 HOURS PRN
Qty: 120 TABLET | Refills: 0 | OUTPATIENT
Start: 2021-09-14

## 2021-09-15 ENCOUNTER — PATIENT MESSAGE (OUTPATIENT)
Dept: PALLIATIVE MEDICINE | Facility: CLINIC | Age: 61
End: 2021-09-15

## 2021-09-15 DIAGNOSIS — J96.11 CHRONIC RESPIRATORY FAILURE WITH HYPOXIA: ICD-10-CM

## 2021-09-16 RX ORDER — HYDROCODONE BITARTRATE AND ACETAMINOPHEN 10; 325 MG/1; MG/1
1 TABLET ORAL EVERY 6 HOURS PRN
Qty: 120 TABLET | Refills: 0 | Status: SHIPPED | OUTPATIENT
Start: 2021-09-23 | End: 2021-10-18 | Stop reason: SDUPTHER

## 2021-09-28 ENCOUNTER — PATIENT MESSAGE (OUTPATIENT)
Dept: PALLIATIVE MEDICINE | Facility: CLINIC | Age: 61
End: 2021-09-28

## 2021-09-28 DIAGNOSIS — R06.00 DYSPNEA, UNSPECIFIED TYPE: ICD-10-CM

## 2021-09-28 DIAGNOSIS — J44.9 STAGE 3 SEVERE COPD BY GOLD CLASSIFICATION: ICD-10-CM

## 2021-09-29 ENCOUNTER — PATIENT MESSAGE (OUTPATIENT)
Dept: PALLIATIVE MEDICINE | Facility: CLINIC | Age: 61
End: 2021-09-29

## 2021-09-29 ENCOUNTER — PATIENT MESSAGE (OUTPATIENT)
Dept: PULMONOLOGY | Facility: CLINIC | Age: 61
End: 2021-09-29

## 2021-09-29 ENCOUNTER — DOCUMENTATION ONLY (OUTPATIENT)
Dept: HEPATOLOGY | Facility: HOSPITAL | Age: 61
End: 2021-09-29

## 2021-09-29 DIAGNOSIS — F41.9 ANXIETY: ICD-10-CM

## 2021-09-29 RX ORDER — MORPHINE SULFATE 15 MG/1
15 TABLET, FILM COATED, EXTENDED RELEASE ORAL EVERY 12 HOURS
Qty: 60 TABLET | Refills: 0 | Status: SHIPPED | OUTPATIENT
Start: 2021-09-29 | End: 2021-10-25 | Stop reason: SDUPTHER

## 2021-09-30 RX ORDER — CLONAZEPAM 1 MG/1
1 TABLET ORAL 2 TIMES DAILY PRN
Qty: 60 TABLET | Refills: 0 | Status: SHIPPED | OUTPATIENT
Start: 2021-09-30 | End: 2021-10-25 | Stop reason: SDUPTHER

## 2021-10-08 ENCOUNTER — TELEPHONE (OUTPATIENT)
Dept: PULMONOLOGY | Facility: CLINIC | Age: 61
End: 2021-10-08

## 2021-10-08 DIAGNOSIS — Z01.812 ENCOUNTER FOR PREPROCEDURE SCREENING LABORATORY TESTING FOR COVID-19: Primary | ICD-10-CM

## 2021-10-08 DIAGNOSIS — Z11.52 ENCOUNTER FOR PREPROCEDURE SCREENING LABORATORY TESTING FOR COVID-19: Primary | ICD-10-CM

## 2021-10-15 ENCOUNTER — OFFICE VISIT (OUTPATIENT)
Dept: PULMONOLOGY | Facility: CLINIC | Age: 61
End: 2021-10-15
Payer: MEDICAID

## 2021-10-15 ENCOUNTER — HOSPITAL ENCOUNTER (OUTPATIENT)
Dept: RADIOLOGY | Facility: HOSPITAL | Age: 61
Discharge: HOME | End: 2021-10-15
Attending: INTERNAL MEDICINE
Payer: MEDICAID

## 2021-10-15 ENCOUNTER — CLINICAL SUPPORT (OUTPATIENT)
Dept: PULMONOLOGY | Facility: CLINIC | Age: 61
End: 2021-10-15
Payer: MEDICAID

## 2021-10-15 VITALS
BODY MASS INDEX: 24.59 KG/M2 | HEIGHT: 63 IN | SYSTOLIC BLOOD PRESSURE: 120 MMHG | OXYGEN SATURATION: 88 % | HEART RATE: 95 BPM | DIASTOLIC BLOOD PRESSURE: 76 MMHG | RESPIRATION RATE: 16 BRPM | WEIGHT: 138.75 LBS

## 2021-10-15 DIAGNOSIS — E11.9 TYPE 2 DIABETES MELLITUS WITHOUT COMPLICATION, WITHOUT LONG-TERM CURRENT USE OF INSULIN: ICD-10-CM

## 2021-10-15 DIAGNOSIS — Z99.81 HYPOXEMIA REQUIRING SUPPLEMENTAL OXYGEN: ICD-10-CM

## 2021-10-15 DIAGNOSIS — I70.0 AORTIC ATHEROSCLEROSIS: ICD-10-CM

## 2021-10-15 DIAGNOSIS — J44.9 STAGE 3 SEVERE COPD BY GOLD CLASSIFICATION: ICD-10-CM

## 2021-10-15 DIAGNOSIS — R05.3 CHRONIC COUGH: ICD-10-CM

## 2021-10-15 DIAGNOSIS — F17.200 TOBACCO DEPENDENCE: Chronic | ICD-10-CM

## 2021-10-15 DIAGNOSIS — J96.11 CHRONIC RESPIRATORY FAILURE WITH HYPOXIA: ICD-10-CM

## 2021-10-15 DIAGNOSIS — J44.9 STAGE 3 SEVERE COPD BY GOLD CLASSIFICATION: Primary | ICD-10-CM

## 2021-10-15 DIAGNOSIS — R09.02 HYPOXEMIA REQUIRING SUPPLEMENTAL OXYGEN: ICD-10-CM

## 2021-10-15 LAB
BRPFT: ABNORMAL
DLCO ADJ PRE: 9.81 ML/(MIN*MMHG) (ref 16.04–27.51)
DLCO SINGLE BREATH LLN: 16.04
DLCO SINGLE BREATH PRE REF: 45.1 %
DLCO SINGLE BREATH REF: 21.78
DLCOC SBVA LLN: 3.1
DLCOC SBVA PRE REF: 71.5 %
DLCOC SBVA REF: 4.7
DLCOC SINGLE BREATH LLN: 16.04
DLCOC SINGLE BREATH PRE REF: 45.1 %
DLCOC SINGLE BREATH REF: 21.78
DLCOVA LLN: 3.1
DLCOVA PRE REF: 71.5 %
DLCOVA PRE: 3.36 ML/(MIN*MMHG*L) (ref 3.1–6.29)
DLCOVA REF: 4.7
DLVAADJ PRE: 3.36 ML/(MIN*MMHG*L) (ref 3.1–6.29)
ERV LLN: -16449.21
ERV PRE REF: 84 %
ERV REF: 0.79
FEF 25 75 LLN: 1.13
FEF 25 75 PRE REF: 10 %
FEF 25 75 REF: 2.2
FEV1 FVC LLN: 68
FEV1 FVC PRE REF: 43.5 %
FEV1 FVC REF: 80
FEV1 LLN: 1.79
FEV1 PRE REF: 28.8 %
FEV1 REF: 2.36
FRCPLETH LLN: 1.78
FRCPLETH PREREF: 73.4 %
FRCPLETH REF: 2.6
FVC LLN: 2.26
FVC PRE REF: 66 %
FVC REF: 2.98
IVC PRE: 1.84 L (ref 2.26–3.7)
IVC SINGLE BREATH LLN: 2.26
IVC SINGLE BREATH PRE REF: 61.8 %
IVC SINGLE BREATH REF: 2.98
MVV LLN: 74
MVV PRE REF: 25.4 %
MVV REF: 87
PEF LLN: 4.46
PEF PRE REF: 36.4 %
PEF REF: 6.07
PRE DLCO: 9.81 ML/(MIN*MMHG) (ref 16.04–27.51)
PRE ERV: 0.67 L (ref -16449.21–16450.79)
PRE FEF 25 75: 0.22 L/S (ref 1.13–3.27)
PRE FET 100: 14.16 SEC
PRE FEV1 FVC: 34.6 % (ref 67.63–91.64)
PRE FEV1: 0.68 L (ref 1.79–2.94)
PRE FRC PL: 1.91 L
PRE FVC: 1.97 L (ref 2.26–3.7)
PRE MVV: 22 L/MIN (ref 73.64–99.64)
PRE PEF: 2.21 L/S (ref 4.46–7.69)
PRE RV: 1.08 L (ref 1.23–2.38)
PRE TLC: 3.05 L (ref 3.65–5.63)
RAW LLN: 3.06
RAW PRE REF: 890.1 %
RAW PRE: 27.23 CMH2O*S/L (ref 3.06–3.06)
RAW REF: 3.06
RV LLN: 1.23
RV PRE REF: 59.8 %
RV REF: 1.8
RVTLC LLN: 29
RVTLC PRE REF: 90.8 %
RVTLC PRE: 35.42 % (ref 29.43–48.61)
RVTLC REF: 39
TLC LLN: 3.65
TLC PRE REF: 65.7 %
TLC REF: 4.64
VA PRE: 2.92 L (ref 4.49–4.49)
VA SINGLE BREATH LLN: 4.49
VA SINGLE BREATH PRE REF: 65.2 %
VA SINGLE BREATH REF: 4.49
VC LLN: 2.26
VC PRE REF: 66 %
VC PRE: 1.97 L (ref 2.26–3.7)
VC REF: 2.98
VTGRAWPRE: 2.03 L

## 2021-10-15 PROCEDURE — 94729 PR C02/MEMBANE DIFFUSE CAPACITY: ICD-10-PCS | Mod: 26,S$PBB,, | Performed by: INTERNAL MEDICINE

## 2021-10-15 PROCEDURE — 94010 BREATHING CAPACITY TEST: CPT | Mod: 26,S$PBB,, | Performed by: INTERNAL MEDICINE

## 2021-10-15 PROCEDURE — 99999 PR PBB SHADOW E&M-EST. PATIENT-LVL V: ICD-10-PCS | Mod: PBBFAC,,, | Performed by: INTERNAL MEDICINE

## 2021-10-15 PROCEDURE — 94726 PLETHYSMOGRAPHY LUNG VOLUMES: CPT | Mod: 26,S$PBB,, | Performed by: INTERNAL MEDICINE

## 2021-10-15 PROCEDURE — 99214 OFFICE O/P EST MOD 30 MIN: CPT | Mod: 25,S$PBB,, | Performed by: INTERNAL MEDICINE

## 2021-10-15 PROCEDURE — 94010 BREATHING CAPACITY TEST: ICD-10-PCS | Mod: 26,S$PBB,, | Performed by: INTERNAL MEDICINE

## 2021-10-15 PROCEDURE — 99214 PR OFFICE/OUTPT VISIT, EST, LEVL IV, 30-39 MIN: ICD-10-PCS | Mod: 25,S$PBB,, | Performed by: INTERNAL MEDICINE

## 2021-10-15 PROCEDURE — 94726 PLETHYSMOGRAPHY LUNG VOLUMES: CPT | Mod: PBBFAC

## 2021-10-15 PROCEDURE — 94729 DIFFUSING CAPACITY: CPT | Mod: PBBFAC

## 2021-10-15 PROCEDURE — 94726 PULM FUNCT TST PLETHYSMOGRAP: ICD-10-PCS | Mod: 26,S$PBB,, | Performed by: INTERNAL MEDICINE

## 2021-10-15 PROCEDURE — 99999 PR PBB SHADOW E&M-EST. PATIENT-LVL V: CPT | Mod: PBBFAC,,, | Performed by: INTERNAL MEDICINE

## 2021-10-15 PROCEDURE — 71046 XR CHEST PA AND LATERAL: ICD-10-PCS | Mod: 26,,, | Performed by: RADIOLOGY

## 2021-10-15 PROCEDURE — 71046 X-RAY EXAM CHEST 2 VIEWS: CPT | Mod: TC

## 2021-10-15 PROCEDURE — 71046 X-RAY EXAM CHEST 2 VIEWS: CPT | Mod: 26,,, | Performed by: RADIOLOGY

## 2021-10-15 PROCEDURE — 94729 DIFFUSING CAPACITY: CPT | Mod: 26,S$PBB,, | Performed by: INTERNAL MEDICINE

## 2021-10-15 PROCEDURE — 94010 BREATHING CAPACITY TEST: CPT | Mod: PBBFAC

## 2021-10-15 PROCEDURE — 99215 OFFICE O/P EST HI 40 MIN: CPT | Mod: PBBFAC,25 | Performed by: INTERNAL MEDICINE

## 2021-10-15 RX ORDER — ASPIRIN 81 MG/1
81 TABLET ORAL DAILY
COMMUNITY
Start: 2021-10-07

## 2021-10-15 RX ORDER — FUROSEMIDE 20 MG/1
20 TABLET ORAL DAILY
COMMUNITY
Start: 2021-10-04

## 2021-10-15 RX ORDER — ERGOCALCIFEROL 1.25 MG/1
50000 CAPSULE ORAL
COMMUNITY
Start: 2021-09-20

## 2021-10-15 RX ORDER — METOPROLOL TARTRATE 25 MG/1
25 TABLET, FILM COATED ORAL 2 TIMES DAILY
COMMUNITY
Start: 2021-10-07

## 2021-10-16 ENCOUNTER — PATIENT MESSAGE (OUTPATIENT)
Dept: PALLIATIVE MEDICINE | Facility: CLINIC | Age: 61
End: 2021-10-16
Payer: MEDICAID

## 2021-10-18 ENCOUNTER — PATIENT MESSAGE (OUTPATIENT)
Dept: PULMONOLOGY | Facility: CLINIC | Age: 61
End: 2021-10-18
Payer: MEDICAID

## 2021-10-18 DIAGNOSIS — J96.11 CHRONIC RESPIRATORY FAILURE WITH HYPOXIA: ICD-10-CM

## 2021-10-18 RX ORDER — HYDROCODONE BITARTRATE AND ACETAMINOPHEN 10; 325 MG/1; MG/1
1 TABLET ORAL EVERY 6 HOURS PRN
Qty: 120 TABLET | Refills: 0 | Status: SHIPPED | OUTPATIENT
Start: 2021-10-18 | End: 2021-11-18 | Stop reason: SDUPTHER

## 2021-10-25 ENCOUNTER — PATIENT MESSAGE (OUTPATIENT)
Dept: SLEEP MEDICINE | Facility: CLINIC | Age: 61
End: 2021-10-25
Payer: MEDICAID

## 2021-10-25 ENCOUNTER — DOCUMENTATION ONLY (OUTPATIENT)
Dept: PALLIATIVE MEDICINE | Facility: CLINIC | Age: 61
End: 2021-10-25
Payer: MEDICAID

## 2021-10-25 RX ORDER — NALOXONE HYDROCHLORIDE 4 MG/.1ML
1 SPRAY NASAL ONCE
Qty: 1 EACH | Refills: 0 | Status: SHIPPED | OUTPATIENT
Start: 2021-10-25 | End: 2021-10-25

## 2021-11-15 ENCOUNTER — OFFICE VISIT (OUTPATIENT)
Dept: PALLIATIVE MEDICINE | Facility: CLINIC | Age: 61
End: 2021-11-15
Payer: MEDICAID

## 2021-11-15 DIAGNOSIS — R06.00 DYSPNEA, UNSPECIFIED TYPE: ICD-10-CM

## 2021-11-15 DIAGNOSIS — Z51.5 ENCOUNTER FOR PALLIATIVE CARE: Primary | ICD-10-CM

## 2021-11-15 DIAGNOSIS — J44.9 STAGE 3 SEVERE COPD BY GOLD CLASSIFICATION: ICD-10-CM

## 2021-11-15 DIAGNOSIS — F41.9 ANXIETY: ICD-10-CM

## 2021-11-15 PROCEDURE — 99215 OFFICE O/P EST HI 40 MIN: CPT | Mod: 95,,, | Performed by: NURSE PRACTITIONER

## 2021-11-15 PROCEDURE — 99215 PR OFFICE/OUTPT VISIT, EST, LEVL V, 40-54 MIN: ICD-10-PCS | Mod: 95,,, | Performed by: NURSE PRACTITIONER

## 2021-11-16 ENCOUNTER — CLINICAL SUPPORT (OUTPATIENT)
Dept: SMOKING CESSATION | Facility: CLINIC | Age: 61
End: 2021-11-16
Payer: COMMERCIAL

## 2021-11-16 DIAGNOSIS — F17.200 NICOTINE DEPENDENCE: Primary | ICD-10-CM

## 2021-11-16 PROCEDURE — 99407 PR TOBACCO USE CESSATION INTENSIVE >10 MINUTES: ICD-10-PCS | Mod: S$GLB,,,

## 2021-11-16 PROCEDURE — 99407 BEHAV CHNG SMOKING > 10 MIN: CPT | Mod: S$GLB,,,

## 2021-11-18 ENCOUNTER — PATIENT MESSAGE (OUTPATIENT)
Dept: SMOKING CESSATION | Facility: CLINIC | Age: 61
End: 2021-11-18
Payer: MEDICAID

## 2021-11-18 ENCOUNTER — PATIENT MESSAGE (OUTPATIENT)
Dept: PALLIATIVE MEDICINE | Facility: CLINIC | Age: 61
End: 2021-11-18
Payer: MEDICAID

## 2021-11-22 ENCOUNTER — DOCUMENTATION ONLY (OUTPATIENT)
Dept: HEPATOLOGY | Facility: HOSPITAL | Age: 61
End: 2021-11-22
Payer: MEDICAID

## 2021-12-10 DIAGNOSIS — J96.11 CHRONIC RESPIRATORY FAILURE WITH HYPOXIA: ICD-10-CM

## 2021-12-10 RX ORDER — HYDROCODONE BITARTRATE AND ACETAMINOPHEN 10; 325 MG/1; MG/1
1 TABLET ORAL EVERY 6 HOURS PRN
Qty: 120 TABLET | Refills: 0 | Status: SHIPPED | OUTPATIENT
Start: 2021-12-10 | End: 2022-01-11 | Stop reason: SDUPTHER

## 2021-12-10 RX ORDER — PROMETHAZINE HYDROCHLORIDE 6.25 MG/5ML
6.25 SYRUP ORAL EVERY 6 HOURS PRN
Qty: 240 ML | Refills: 6 | Status: SHIPPED | OUTPATIENT
Start: 2021-12-10

## 2021-12-20 DIAGNOSIS — F41.9 ANXIETY: ICD-10-CM

## 2021-12-20 RX ORDER — CLONAZEPAM 1 MG/1
1 TABLET ORAL 2 TIMES DAILY PRN
Qty: 60 TABLET | Refills: 0 | Status: SHIPPED | OUTPATIENT
Start: 2021-12-20 | End: 2021-12-23 | Stop reason: SDUPTHER

## 2021-12-23 ENCOUNTER — PATIENT MESSAGE (OUTPATIENT)
Dept: PALLIATIVE MEDICINE | Facility: CLINIC | Age: 61
End: 2021-12-23
Payer: MEDICAID

## 2021-12-23 ENCOUNTER — TELEPHONE (OUTPATIENT)
Dept: PALLIATIVE MEDICINE | Facility: CLINIC | Age: 61
End: 2021-12-23
Payer: MEDICAID

## 2021-12-23 DIAGNOSIS — F41.9 ANXIETY: ICD-10-CM

## 2021-12-23 RX ORDER — CLONAZEPAM 1 MG/1
1 TABLET ORAL 2 TIMES DAILY PRN
Qty: 60 TABLET | Refills: 0 | Status: SHIPPED | OUTPATIENT
Start: 2021-12-23 | End: 2022-01-20 | Stop reason: SDUPTHER

## 2022-01-10 ENCOUNTER — OFFICE VISIT (OUTPATIENT)
Dept: PALLIATIVE MEDICINE | Facility: CLINIC | Age: 62
End: 2022-01-10
Payer: MEDICAID

## 2022-01-10 DIAGNOSIS — R06.00 DYSPNEA, UNSPECIFIED TYPE: ICD-10-CM

## 2022-01-10 DIAGNOSIS — J44.9 STAGE 3 SEVERE COPD BY GOLD CLASSIFICATION: ICD-10-CM

## 2022-01-10 DIAGNOSIS — F41.9 ANXIETY: ICD-10-CM

## 2022-01-10 DIAGNOSIS — Z51.5 ENCOUNTER FOR PALLIATIVE CARE: Primary | ICD-10-CM

## 2022-01-10 PROCEDURE — 1159F PR MEDICATION LIST DOCUMENTED IN MEDICAL RECORD: ICD-10-PCS | Mod: CPTII,95,, | Performed by: NURSE PRACTITIONER

## 2022-01-10 PROCEDURE — 1159F MED LIST DOCD IN RCRD: CPT | Mod: CPTII,95,, | Performed by: NURSE PRACTITIONER

## 2022-01-10 PROCEDURE — 1160F RVW MEDS BY RX/DR IN RCRD: CPT | Mod: CPTII,95,, | Performed by: NURSE PRACTITIONER

## 2022-01-10 PROCEDURE — 99215 OFFICE O/P EST HI 40 MIN: CPT | Mod: 95,,, | Performed by: NURSE PRACTITIONER

## 2022-01-10 PROCEDURE — 99215 PR OFFICE/OUTPT VISIT, EST, LEVL V, 40-54 MIN: ICD-10-PCS | Mod: 95,,, | Performed by: NURSE PRACTITIONER

## 2022-01-10 PROCEDURE — 1160F PR REVIEW ALL MEDS BY PRESCRIBER/CLIN PHARMACIST DOCUMENTED: ICD-10-PCS | Mod: CPTII,95,, | Performed by: NURSE PRACTITIONER

## 2022-01-10 NOTE — PROGRESS NOTES
Progress Note  Palliative Care      Consult Requested By: Dr. Toscano, pulmonology  Reason for Consult: ACP and symptom managment     The patient location is: home in [place]   The chief complaint leading to consultation is: f/u palliative care   Visit type: Virtual visit with synchronous audio and video   Each patient to whom he or she provides medical services by telemedicine is: (1) informed of the relationship between the physician and patient and the respective role of any other health care provider with respect to management of the patient; and (2) notified that he or she may decline to receive medical services by telemedicine and may withdraw from such care at any time.        ASSESSMENT/PLAN:     Plan/Recommendations:  Diagnoses and all orders for this visit:    Encounter for palliative care   -patient is decisional   -patient is alone for today's virtual visit   -she is visiting family today and looks better compared to our last visit   -philosophy of palliative medicine reviewed with patient and family at initial visit   -new patient folder given to and reviewed with patient at initial visit   -reviewed ACP documents   -HCPOA:  Giselle Garcia at [phone]   -Pt is DNR and [place] is current in EMR    Stage 3 severe COPD by GOLD classification   -patient is followed by pulmonology, Dr. Toscano (appt coming up on 01/20/22)   -pt with recent exacerbation requiring hospitalization at [facility] 11/06/21   -she had COVID-19 exposure but tested negative in December   -reports weight loss   -continuous O2 at 3 liters nasal cannula    Dyspnea, unspecified type   -continue current opioid regimen for her disabling dyspnea   -MS Contin 15mg PO BID; Norco 10mg PO q 6hrs prn, Promethazine cough syrup   -we discussed changing her Norco to MSIR but she was hesitant to change   -no discrepancies with      Anxiety   -continue Clonazepam   -patient may benefit from SSRI; consider at next visit   -spent time  in attentive listening and reassurance   -continue SW visit through home based palliative care for emotional support       Understanding of illness/Prognosis:  she has good insight into her life-limiting illness     Goals of care:  Life-prolonging    Follow up:  8 weeks      SUBJECTIVE:     History of Present Illness:  Patient is a 61 y.o. year old female presenting with for follow up with palliative medicine.  Pt with advanced lung disease followed by pulmonology, Dr. Toscano.  Her next appt with him is in January 2022.  Patient is on maximum treatment, continues to smoke.        11/15/21:  Patient feels her disease is progressing. She is tearful today.  She reports unintended weight loss over the past few months.  Down 20lbs.  She was hospitalized earlier this month per her report at [facility] with COPD exacerbation.  She reports sats in 70's upon arrival and diagnosed with pneumonia.  She was treated with Levaquin and steroids.  She thinks they did a CT scan but unsure.  Concern about weight loss.  Notable cough on virtual visit.  I suspect she may need to follow up sooner than later with pulmonology.  She continues to have nurse and SW visits through home-based palliative care.  She also has sitter with long-term care waiver.  We briefly touched on goals of care. She wishes to continue with life-prolonging treatments.  She has son who is incarcerated until 2024.  She is hopeful to live until his release.  We did discuss the role of hospice but she is not ready to forego hospitalizations at this point.     Interval History:  01/10/2022  Patient actually looks a little better since our last visit.  She is visiting with family.  She reports one trip to ER for exposure to COVID.  Tested negative for covid and flu.  She using O2 at 3 liters continuously for her disabling dyspnea.  She cannot walk without becoming short of breath.  Her mood is better today.  No major changes but continues with moist cough.   She tells me she is able to recognize when her meds are not working and she should go to hospital for steroids.  She is still struggling with pulmonary cachexia.  Overall, about the same. No new problems to address.  She has completed ACP.  She is a candidate for hospice when she agrees with philosophy or recommended by pulmonology.       DWAYNE JACOBS reviewed and summarized:        Past Medical History:   Diagnosis Date    Acid reflux     Anemia     Asthma     Bronchitis chronic     Diabetes mellitus     pt states she's not a diabetic    DVT, lower extremity     RLE    Emphysema of lung     Hepatitis B     Hepatitis C     Herniated disc     Hyperlipidemia     Kidney stone     Lung disease     Oxygen dependent     nightly    Supraventricular tachycardia     Urinary tract infection      Past Surgical History:   Procedure Laterality Date    abdominal laparotomy      BREAST SURGERY Right     lumpectomy x 2 benign    CERVICAL FUSION      C4 & C5     SECTION, CLASSIC      CHOLECYSTECTOMY      CYSTOSCOPY W/ LASER LITHOTRIPSY      HERNIA REPAIR      KNEE ARTHROSCOPY W/ ACL RECONSTRUCTION      L knee    orif knee Right     SALPINGECTOMY Right      Family History   Problem Relation Age of Onset    Cancer Mother     Diabetes Father     Hypertension Father     Heart disease Father     Heart attack Father 60        [facility]- CABG    Cancer Maternal Grandmother     Diabetes Paternal Grandmother     Heart failure Paternal Grandmother      Review of patient's allergies indicates:   Allergen Reactions    Nitrofurantoin monohyd/m-cryst Hives and Itching    Nsaids (non-steroidal anti-inflammatory drug) Other (See Comments)       Medications:    Current Outpatient Medications:     albuterol (PROVENTIL/VENTOLIN HFA) 90 mcg/actuation inhaler, Inhale 2 puffs into the lungs every 4 (four) hours as needed. Rescue, Disp: 18 g, Rfl: 5    albuterol-ipratropium (DUO-NEB) 2.5 mg-0.5 mg/3 mL nebulizer  solution, USE 1 VIAL IN NEBULIZER 3-4 TIMES DAILY FOR 3 DAYS FOLLOWING DISCHARGE. THEN USE EVERY 6 HOURS AS NEEDED FOR SHORTNESS OF BREATH AND WHEEZE., Disp: 180 mL, Rfl: 11    aspirin (ECOTRIN) 81 MG EC tablet, Take 81 mg by mouth once daily., Disp: , Rfl:     atorvastatin (LIPITOR) 40 MG tablet, Take 40 mg by mouth., Disp: , Rfl:     azithromycin (ZITHROMAX) 500 MG tablet, TAKE 1 TABLET (500 MG TOTAL) BY MOUTH ONCE DAILY., Disp: 3 tablet, Rfl: 3    blood sugar diagnostic Strp, 1 each., Disp: , Rfl:     blood-glucose meter Misc, Use to check blood sugar, Disp: , Rfl:     clonazePAM (KLONOPIN) 1 MG tablet, Take 1 tablet (1 mg total) by mouth 2 (two) times daily as needed for Anxiety., Disp: 60 tablet, Rfl: 0    docusate sodium (COLACE) 100 MG capsule, Take 1 capsule (100 mg total) by mouth 2 (two) times daily., Disp: 60 capsule, Rfl: 0    fluticasone propionate (FLOVENT HFA) 220 mcg/actuation inhaler, Inhale 1 puff into the lungs 2 (two) times daily. Controller, Disp: 12 g, Rfl: 12    furosemide (LASIX) 20 MG tablet, Take 20 mg by mouth once daily., Disp: , Rfl:     HYDROcodone-acetaminophen (NORCO)  mg per tablet, Take 1 tablet by mouth every 6 (six) hours as needed (shortness of breath)., Disp: 120 tablet, Rfl: 0    inhalation device (VORTEX HOLDING CHAMBER), Use as directed for inhalation. For use with albuterol inhaler., Disp: 1 Device, Rfl: prn    LANTUS SOLOSTAR U-100 INSULIN glargine 100 units/mL (3mL) SubQ pen, , Disp: , Rfl:     metFORMIN (GLUCOPHAGE-XR) 750 MG 24 hr tablet, TAKE ONE TABLET BY MOUTH DAILY WITH DINNER, Disp: , Rfl: 5    metoprolol tartrate (LOPRESSOR) 25 MG tablet, Take 25 mg by mouth 2 (two) times daily., Disp: , Rfl:     morphine (MS CONTIN) 15 MG 12 hr tablet, Take 1 tablet (15 mg total) by mouth every 12 (twelve) hours., Disp: 60 tablet, Rfl: 0    nystatin (MYCOSTATIN) 100,000 unit/mL suspension, , Disp: , Rfl:     omeprazole (PRILOSEC) 20 MG capsule, Take 1  capsule (20 mg total) by mouth once daily., Disp: 90 capsule, Rfl: 3    predniSONE (DELTASONE) 20 MG tablet, TAKE 2 TABLETS BY MOUTH DAILY FOR 3 DAYS, 1 TABLET DAILY FOR 3 DAYS, THEN 1/2 TABLET DAILY FOR 3 DAYS, THEN STOP, Disp: 11 tablet, Rfl: 3    promethazine (PHENERGAN) 6.25 mg/5 mL syrup, TAKE 5 MLS (6.25 MG TOTAL) BY MOUTH EVERY 6 (SIX) HOURS AS NEEDED FOR NAUSEA., Disp: 240 mL, Rfl: 0    promethazine (PHENERGAN) 6.25 mg/5 mL syrup, Take 5 mLs (6.25 mg total) by mouth every 6 (six) hours as needed for Nausea., Disp: 240 mL, Rfl: 6    promethazine (PHENERGAN) 6.25 mg/5 mL syrup, Take 5 mLs (6.25 mg total) by mouth every 6 (six) hours as needed for Nausea., Disp: 240 mL, Rfl: 6    tiotropium-olodateroL (STIOLTO RESPIMAT) 2.5-2.5 mcg/actuation Mist, Inhale 2 puffs into the lungs once daily., Disp: 4 g, Rfl: 11    VITAMIN D2 1,250 mcg (50,000 unit) capsule, Take 50,000 Units by mouth every 7 days., Disp: , Rfl:     OBJECTIVE:       ROS:  Review of Systems   Constitutional: Positive for activity change, appetite change, fatigue and unexpected weight change. Negative for fever.   HENT: Negative for congestion, sore throat and trouble swallowing.    Eyes: Negative.    Respiratory: Positive for cough and shortness of breath.    Gastrointestinal: Positive for nausea (sometimes). Negative for constipation and diarrhea.   Endocrine: Negative.    Genitourinary: Negative for difficulty urinating.   Musculoskeletal: Positive for arthralgias and back pain.   Neurological: Positive for weakness. Negative for dizziness, seizures, syncope and headaches.   Psychiatric/Behavioral: Positive for dysphoric mood. Negative for confusion. The patient is nervous/anxious.        Review of Symptoms    Symptom Assessment (ESAS 0-10 Scale)  Pain:  0  Dyspnea:  0  Anxiety:  0  Nausea:  0  Depression:  0  Anorexia:  0  Fatigue:  0  Insomnia:  0  Restlessness:  0  Agitation:  0  Unable to complete assessment due to Other     CAM /  Delirium:  Negative  Constipation:  Negative  Diarrhea:  Negative    Comments:  Yes    Performance Status:  50    Living Arrangements:  Lives alone and Lives in home    Psychosocial/Cultural: Lives in [facility]; her son is incarcerated, she has support from friends and other family      Advance Care Planning   Advance Directives:   [place]    Medical Power of : Yes                Physical Exam: limited exam due to virtual visit  Vitals:   she tells me her O2 sats drop to the 70s without oxygen and run about 89-90% with oxygen  Physical Exam  Constitutional:       Appearance: Normal appearance. She is ill-appearing (chronically).   HENT:      Mouth/Throat:      Comments: Missing teeth  Pulmonary:      Comments: + cough, dyspnea with talking, increased effort on oxygen at 3 liters nasal cannula  Neurological:      Mental Status: She is oriented to person, place, and time.   Psychiatric:         Attention and Perception: Attention and perception normal.         Mood and Affect: Affect normal. Mood is anxious.         Speech: Speech normal.         Behavior: Behavior normal. Behavior is cooperative.         Thought Content: Thought content normal.         Cognition and Memory: Cognition normal.         Judgment: Judgment normal.         Labs:  CBC:   WBC   Date Value Ref Range Status   01/26/2019 9.52 3.90 - 12.70 K/uL Final     Hemoglobin   Date Value Ref Range Status   01/26/2019 11.7 (L) 12.0 - 16.0 g/dL Final     Hematocrit   Date Value Ref Range Status   01/26/2019 36.0 (L) 37.0 - 48.5 % Final     MCV   Date Value Ref Range Status   01/26/2019 105 (H) 82 - 98 fL Final     Platelets   Date Value Ref Range Status   01/26/2019 285 150 - 350 K/uL Final       LFT:   Lab Results   Component Value Date    AST 13 01/26/2019    ALKPHOS 86 01/26/2019    BILITOT 0.2 01/26/2019       Albumin:   Albumin   Date Value Ref Range Status   01/26/2019 3.3 (L) 3.5 - 5.2 g/dL Final     Protein:   Total Protein    Date Value Ref Range Status   01/26/2019 6.6 6.0 - 8.4 g/dL Final           TOTAL TIME: 50    50 minutes of total time spent on the encounter, which includes face to face time and non-face to face time preparing to see the patient (eg, review of tests), Obtaining and/or reviewing separately obtained history, Documenting clinical information in the electronic or other health record, Independently interpreting results (not separately reported) and communicating results to the patient/family/caregiver, or Care coordination (not separately reported).      Signature: Loulou Jimenez NP

## 2022-01-11 DIAGNOSIS — J96.11 CHRONIC RESPIRATORY FAILURE WITH HYPOXIA: ICD-10-CM

## 2022-01-11 RX ORDER — HYDROCODONE BITARTRATE AND ACETAMINOPHEN 10; 325 MG/1; MG/1
1 TABLET ORAL EVERY 6 HOURS PRN
Qty: 120 TABLET | Refills: 0 | Status: SHIPPED | OUTPATIENT
Start: 2022-01-11

## 2022-01-17 DIAGNOSIS — J44.9 STAGE 3 SEVERE COPD BY GOLD CLASSIFICATION: ICD-10-CM

## 2022-01-17 DIAGNOSIS — R06.00 DYSPNEA, UNSPECIFIED TYPE: ICD-10-CM

## 2022-01-20 ENCOUNTER — PATIENT MESSAGE (OUTPATIENT)
Dept: PALLIATIVE MEDICINE | Facility: CLINIC | Age: 62
End: 2022-01-20
Payer: MEDICAID

## 2022-01-20 DIAGNOSIS — R06.00 DYSPNEA, UNSPECIFIED TYPE: ICD-10-CM

## 2022-01-20 DIAGNOSIS — J44.9 STAGE 3 SEVERE COPD BY GOLD CLASSIFICATION: ICD-10-CM

## 2022-01-20 DIAGNOSIS — F41.9 ANXIETY: ICD-10-CM

## 2022-01-20 RX ORDER — MORPHINE SULFATE 15 MG/1
15 TABLET, FILM COATED, EXTENDED RELEASE ORAL EVERY 12 HOURS
Qty: 60 TABLET | Refills: 0 | Status: SHIPPED | OUTPATIENT
Start: 2022-01-20 | End: 2023-01-20

## 2022-01-20 RX ORDER — CLONAZEPAM 1 MG/1
1 TABLET ORAL 2 TIMES DAILY PRN
Qty: 60 TABLET | Refills: 0 | Status: SHIPPED | OUTPATIENT
Start: 2022-01-20

## 2022-01-20 RX ORDER — MORPHINE SULFATE 15 MG/1
15 TABLET, FILM COATED, EXTENDED RELEASE ORAL EVERY 12 HOURS
Qty: 60 TABLET | Refills: 0 | OUTPATIENT
Start: 2022-01-20 | End: 2023-01-20

## 2022-01-20 NOTE — TELEPHONE ENCOUNTER
reviewed.  Patient needs appointment next month with Dr. Sophie Diggs with palliative medicine.

## 2022-01-21 ENCOUNTER — TELEPHONE (OUTPATIENT)
Dept: PALLIATIVE MEDICINE | Facility: HOSPITAL | Age: 62
End: 2022-01-21
Payer: MEDICAID

## 2022-01-21 DIAGNOSIS — J96.11 CHRONIC RESPIRATORY FAILURE WITH HYPOXIA: ICD-10-CM

## 2022-01-21 DIAGNOSIS — R06.02 SHORTNESS OF BREATH: ICD-10-CM

## 2022-01-21 DIAGNOSIS — J44.9 STAGE 3 SEVERE COPD BY GOLD CLASSIFICATION: Primary | ICD-10-CM

## 2022-01-21 NOTE — TELEPHONE ENCOUNTER
[facility] PALLIATIVE MEDICINE  Medical Specialty       Patient Name: Sarah Monahan  MRN: [id number]  Primary Care Physician: MINA Zepeda    Spoke to CASANDRA Rolon with [facility], who reports pt is transitioning to hospice. Requested recent palliative note and hospice order. Order placed and [facility]ed [facility]    Diana Pendleton, MSW, LCSW  [phone]

## 2022-02-18 ENCOUNTER — TELEPHONE (OUTPATIENT)
Dept: PALLIATIVE MEDICINE | Facility: CLINIC | Age: 62
End: 2022-02-18
Payer: MEDICAID

## 2022-02-18 NOTE — TELEPHONE ENCOUNTER
Nurse called pt to check in on her, she is doing fine, she is with hospice now with out any complaints.

## 2023-05-03 ENCOUNTER — PATIENT MESSAGE (OUTPATIENT)
Dept: RESEARCH | Facility: HOSPITAL | Age: 63
End: 2023-05-03
Payer: MEDICAID

## 2025-01-04 NOTE — TRANSFER OF CARE
"Anesthesia Transfer of Care Note    Patient: Sarah Monahan    Procedure(s) Performed: Procedure(s) (LRB):  LITHOTRIPSY-LASER (Right)  CYSTOSCOPY WITH STENT REMOVAL (Right)  PYELOGRAM-RETROGRADE (Right)  CYSTOSCOPY WITH STENT PLACEMENT (Right)  REMOVAL-STONE-URETERAL    Patient location: PACU    Anesthesia Type: general    Transport from OR: Transported from OR on room air with adequate spontaneous ventilation    Post pain: adequate analgesia    Post assessment: no apparent anesthetic complications and tolerated procedure well    Post vital signs: stable    Level of consciousness: awake    Nausea/Vomiting: no nausea/vomiting    Complications: none          Last vitals:   Visit Vitals    BP (!) 135/59 (BP Location: Right arm, Patient Position: Sitting, BP Method: Automatic)    Pulse 87    Temp 36.8 °C (98.2 °F) (Oral)    Resp 18    Ht 5' 4" (1.626 m)    Wt 70.3 kg (155 lb)    SpO2 95%    Breastfeeding No    BMI 26.61 kg/m2     "
Unable to answer due to medical condition/unresponsive/etc...

## (undated) DEVICE — ADHESIVE MASTISOL VIAL 48/BX

## (undated) DEVICE — SHEATH URTRL ACCESS 14F 35CM

## (undated) DEVICE — SEE MEDLINE ITEM 157185

## (undated) DEVICE — ELECTRODE REM PLYHSV RETURN 9

## (undated) DEVICE — ADAPTER TUOHY BORST 6FR

## (undated) DEVICE — GOWN SMARTGOWN LVL4 X-LONG XL

## (undated) DEVICE — CLOSURE SKIN 1/4INX1-1/2IN

## (undated) DEVICE — SYR ONLY LUER LOCK 20CC

## (undated) DEVICE — SKIN MARKER DEVON 160

## (undated) DEVICE — SEE MEDLINE ITEM 152622

## (undated) DEVICE — GAUZE SPONGE 4X4 12PLY

## (undated) DEVICE — CANISTER SUCTION MEDI-VAC 12L

## (undated) DEVICE — SET IRR URLGY 2LINE UNIV SPIKE

## (undated) DEVICE — SEE MEDLINE ITEM 154981

## (undated) DEVICE — CLOSURE SKIN STERI STRIP 1/2X4

## (undated) DEVICE — MANIFOLD 4 PORT

## (undated) DEVICE — SUPPORT ULNA NERVE PROTECTOR

## (undated) DEVICE — WIRE GUIDE 0.038OLD

## (undated) DEVICE — GLOVE SURGICAL LATEX SZ 8

## (undated) DEVICE — BASKET STNE RTRVL HLC 3F 4WR O

## (undated) DEVICE — SOL WATER STRL IRR 1000ML

## (undated) DEVICE — SEE MEDLINE ITEM 152487

## (undated) DEVICE — Device

## (undated) DEVICE — SOL IRR NACL .9% 3000ML

## (undated) DEVICE — SET EXTENSION ULTRASITE

## (undated) DEVICE — POSITIONER HEAD DONUT 9IN FOAM

## (undated) DEVICE — GUIDEWIRE AMPLATZ .035X145CM

## (undated) DEVICE — CONTAINER SPECIMEN STRL 4OZ

## (undated) DEVICE — UROVIEW 2600/2800